# Patient Record
Sex: FEMALE | Race: WHITE | NOT HISPANIC OR LATINO | Employment: OTHER | URBAN - METROPOLITAN AREA
[De-identification: names, ages, dates, MRNs, and addresses within clinical notes are randomized per-mention and may not be internally consistent; named-entity substitution may affect disease eponyms.]

---

## 2017-01-31 ENCOUNTER — OFFICE VISIT (OUTPATIENT)
Dept: MEDICAL GROUP | Facility: MEDICAL CENTER | Age: 72
End: 2017-01-31
Payer: MEDICARE

## 2017-01-31 VITALS
HEIGHT: 60 IN | TEMPERATURE: 98.4 F | DIASTOLIC BLOOD PRESSURE: 84 MMHG | HEART RATE: 103 BPM | WEIGHT: 134 LBS | OXYGEN SATURATION: 98 % | BODY MASS INDEX: 26.31 KG/M2 | SYSTOLIC BLOOD PRESSURE: 140 MMHG

## 2017-01-31 DIAGNOSIS — B00.9 HERPES SIMPLEX VIRUS (HSV) INFECTION: ICD-10-CM

## 2017-01-31 DIAGNOSIS — N28.1 ACQUIRED CYST OF KIDNEY: ICD-10-CM

## 2017-01-31 DIAGNOSIS — M54.5 CHRONIC LOW BACK PAIN, UNSPECIFIED BACK PAIN LATERALITY, WITH SCIATICA PRESENCE UNSPECIFIED: ICD-10-CM

## 2017-01-31 DIAGNOSIS — M85.80 OSTEOPENIA: ICD-10-CM

## 2017-01-31 DIAGNOSIS — G47.00 INSOMNIA, UNSPECIFIED TYPE: ICD-10-CM

## 2017-01-31 DIAGNOSIS — Z78.0 POSTMENOPAUSAL: ICD-10-CM

## 2017-01-31 DIAGNOSIS — G89.29 CHRONIC LOW BACK PAIN, UNSPECIFIED BACK PAIN LATERALITY, WITH SCIATICA PRESENCE UNSPECIFIED: ICD-10-CM

## 2017-01-31 DIAGNOSIS — E06.3 LYMPHOCYTIC THYROIDITIS: ICD-10-CM

## 2017-01-31 DIAGNOSIS — Z00.00 MEDICARE ANNUAL WELLNESS VISIT, SUBSEQUENT: ICD-10-CM

## 2017-01-31 DIAGNOSIS — M43.10 SPONDYLOLISTHESIS, UNSPECIFIED SPINAL REGION: ICD-10-CM

## 2017-01-31 DIAGNOSIS — E78.49 OTHER HYPERLIPIDEMIA: ICD-10-CM

## 2017-01-31 DIAGNOSIS — Z12.31 ENCOUNTER FOR SCREENING MAMMOGRAM FOR BREAST CANCER: ICD-10-CM

## 2017-01-31 DIAGNOSIS — L50.8 CHRONIC URTICARIA: ICD-10-CM

## 2017-01-31 DIAGNOSIS — E53.8 B12 DEFICIENCY: ICD-10-CM

## 2017-01-31 PROCEDURE — 1036F TOBACCO NON-USER: CPT | Performed by: FAMILY MEDICINE

## 2017-01-31 PROCEDURE — G0439 PPPS, SUBSEQ VISIT: HCPCS | Performed by: FAMILY MEDICINE

## 2017-01-31 PROCEDURE — G8432 DEP SCR NOT DOC, RNG: HCPCS | Performed by: FAMILY MEDICINE

## 2017-01-31 ASSESSMENT — PATIENT HEALTH QUESTIONNAIRE - PHQ9: CLINICAL INTERPRETATION OF PHQ2 SCORE: 0

## 2017-01-31 NOTE — MR AVS SNAPSHOT
Joie Hart   2017 11:20 AM   Office Visit   MRN: [id number]    Department:  [facility]   Dept Phone:  [phone]    Description:  Female : 1945   Provider:  Monae Beck M.D.           Reason for Visit     Annual Exam           Allergies as of 2017     Allergen Noted Reactions    Codeine 2016   Rash    Rash all over          Vital Signs     Blood Pressure Pulse Temperature Height Weight Body Mass Index    140/84 mmHg 103 36.9 °C (98.4 °F) 1.524 m (5') 60.782 kg (134 lb) 26.17 kg/m2    Oxygen Saturation Breastfeeding? Smoking Status             98% No Former Smoker         Basic Information     Date Of Birth Sex Race Ethnicity Preferred Language    1945 Female White Non- English      Problem List              ICD-10-CM Priority Class Noted - Resolved    Acquired cyst of kidney N28.1   3/23/2016 - Present    Cortical senile cataract H25.019   10/7/2014 - Present    Lymphocytic thyroiditis E06.3   3/23/2016 - Present    History of thyroid disease Z86.39   3/23/2016 - Present    Herpes simplex virus (HSV) infection B00.9   2012 - Present    HLD (hyperlipidemia) E78.5   2012 - Present    Insomnia G47.00   2013 - Present    Osteopenia M85.80   2012 - Present    Chronic urticaria L50.8   2012 - Present    Chronic back pain M54.9, G89.29   Unknown - Present    Spondylisthesis M43.10   Unknown - Present    Head injury, closed S09.90XA   3/6/2016 - Present    Chronic nonintractable headache R51   2016 - Present    HSV infection B00.9   Unknown - Present    Renal cyst N28.1   2016 - Present    Hair loss L65.9   2016 - Present    Abnormal findings on diagnostic imaging of central nervous system R90.89   2016 - Present    Pain of left great toe M79.675   2016 - Present    Deformity of nail bed L60.9   2016 - Present    B12 deficiency E53.8   2016 - Present      Health Maintenance        Date Due Completion  Dates    IMM DTaP/Tdap/Td Vaccine (1 - Tdap) 6/23/1965 ---    PAP SMEAR 6/23/1967 ---    COLONOSCOPY 6/23/1996 ---    IMM ZOSTER VACCINE 6/23/2006 ---    IMM PNEUMOCOCCAL 65+ (ADULT) LOW/MEDIUM RISK SERIES (1 of 2 - PCV13) 6/23/2011 ---    IMM INFLUENZA (1) 9/1/2016 ---    BONE DENSITY 12/12/2016 12/12/2011, 10/1/2008    MAMMOGRAM 11/14/2017 11/14/2016, 10/13/2015, 11/21/2013, 12/12/2011, 12/10/2010, 10/15/2009, 10/15/2009, 10/1/2008, 10/1/2008            Current Immunizations     No immunizations on file.      Below and/or attached are the medications your provider expects you to take. Review all of your home medications and newly ordered medications with your provider and/or pharmacist. Follow medication instructions as directed by your provider and/or pharmacist. Please keep your medication list with you and share with your provider. Update the information when medications are discontinued, doses are changed, or new medications (including over-the-counter products) are added; and carry medication information at all times in the event of emergency situations     Allergies:  CODEINE - Rash               Medications  Valid as of: January 31, 2017 - 12:38 PM    Generic Name Brand Name Tablet Size Instructions for use    Acyclovir (Tab) ZOVIRAX 800 MG Take 800 mg by mouth 2 times a day.        Amitriptyline HCl (Tab) ELAVIL 150 MG Take 150 mg by mouth every evening.        Cyanocobalamin   by Injection route.        HydrOXYzine HCl (Tab) ATARAX 25 MG Take 25 mg by mouth 3 times a day as needed for Itching.        Levothyroxine Sodium (Tab) SYNTHROID 75 MCG Take 75 mcg by mouth Every morning on an empty stomach.        LORazepam (Tab) ATIVAN 1 MG 1 po 30 minutes prior to procedure.  May repeat x 1        Multiple Vitamins-Minerals (Tab) THERAGRAN-M  Take 1 Tab by mouth every day.        Probiotic Product   Take  by mouth.        TraMADol HCl (Tab) ULTRAM 50 MG TAKE 1 TABLET BY MOUTH FOUR TIMES DAILY AS NEEDED FOR PAIN          Vitamin A   Take  by mouth.        .                 Medicines prescribed today were sent to:     [email or web address] [id number] [place] [address], [place] - [address] [facility]    [address] [place] [address]    Phone: [phone] Fax: [phone]    Open 24 Hours?: No      Medication refill instructions:       If your prescription bottle indicates you have medication refills left, it is not necessary to call your provider’s office. Please contact your pharmacy and they will refill your medication.    If your prescription bottle indicates you do not have any refills left, you may request refills at any time through one of the following ways: The online Storefront system (except Urgent Care), by calling your provider’s office, or by asking your pharmacy to contact your provider’s office with a refill request. Medication refills are processed only during regular business hours and may not be available until the next business day. Your provider may request additional information or to have a follow-up visit with you prior to refilling your medication.   *Please Note: Medication refills are assigned a new Rx number when refilled electronically. Your pharmacy may indicate that no refills were authorized even though a new prescription for the same medication is available at the pharmacy. Please request the medicine by name with the pharmacy before contacting your provider for a refill.           Storefront Access Code: Activation code not generated  Current Storefront Status: Active

## 2017-02-03 NOTE — PROGRESS NOTES
Subjective:     Chief Complaint   Patient presents with   • Annual Exam       Joie Hart is a 71 y.o. female here today for evaluation and management of:    Depression Screening    Little interest or pleasure in doing things?  0 - not at all  Feeling down, depressed , or hopeless? 0 - not at all  Patient Health Questionnaire Score: 0     If depressive symptoms identified deferred to follow up visit unless specifically addressed in assesment and plan.    Screening for Cognitive Impairment    Three Minute Recall (banana, sunrise, fence)  2/3    Draw clock face with all 12 numbers set to the hand to show 10 minures past 11 o'clock  1    Cognitive concerns identified defferred for follow up unless specifically addressed in assesment and plan.    Fall Risk Assessment    Has the patient had two or more falls in the last year or any fall with injury in the last year?  Yes    Safety Assessment    Throw rugs on floor.  Yes  Handrails on all stairs.  No  Good lighting in all hallways.  Yes  Difficulty hearing.  No  Patient counseled about all safety risks that were identified.    Functional Assessment ADLs    Are there any barriers preventing you from cooking for yourself or meeting nutritional needs?  No.    Are there any barriers preventing you from driving safely or obtaining transportation?  No.    Are there any barriers preventing you from using a telephone or calling for help?  No.    Are there any barriers preventing you from shopping?  No.    Are there any barriers preventing you from taking care of your own finances?  No.    Are there any barriers preventing you from managing your medications?  No.    Are currently engaing any exercise or physical activity?  Yes.  Walk     Health Maintenance Summary                IMM ZOSTER VACCINE Overdue 6/23/2006     IMM PNEUMOCOCCAL 65+ (ADULT) LOW/MEDIUM RISK SERIES Overdue 6/23/2011     IMM INFLUENZA Overdue 9/1/2016     COLON CANCER SCREENING ANNUAL FIT Overdue 12/9/2016       Done 12/9/2015     MAMMOGRAM Next Due 11/14/2017      Done 11/14/2016 MA-MAMMO SCREENING BILAT W/TOMOSYNTHESIS W/CAD     Patient has more history with this topic...    BONE DENSITY Next Due 12/8/2019      Done 12/8/2014      Patient has more history with this topic...    IMM DTaP/Tdap/Td Vaccine Next Due 12/9/2025      Done 12/9/2015 Imm Admin: Tdap Vaccine          Patient Care Team:  Monae Beck M.D. as PCP - General (Family Medicine)    Allergies   Allergen Reactions   • Codeine Rash     Rash all over           Current medicines (including changes today)  Current Outpatient Prescriptions   Medication Sig Dispense Refill   • tramadol (ULTRAM) 50 MG Tab TAKE 1 TABLET BY MOUTH FOUR TIMES DAILY AS NEEDED FOR PAIN 60 Tab 0   • acyclovir (ZOVIRAX) 800 MG Tab Take 800 mg by mouth 2 times a day.     • amitriptyline (ELAVIL) 150 MG Tab Take 150 mg by mouth every evening.     • hydrOXYzine (ATARAX) 25 MG Tab Take 25 mg by mouth 3 times a day as needed for Itching.     • VITAMIN A PO Take  by mouth.     • therapeutic multivitamin-minerals (THERAGRAN-M) Tab Take 1 Tab by mouth every day.     • Probiotic Product (PROBIOTIC DAILY PO) Take  by mouth.     • levothyroxine (SYNTHROID) 75 MCG Tab Take 75 mcg by mouth Every morning on an empty stomach.     • lorazepam (ATIVAN) 1 MG Tab 1 po 30 minutes prior to procedure.  May repeat x 1 4 Tab 0   • Cyanocobalamin (VITAMIN B-12 INJ) by Injection route.       No current facility-administered medications for this visit.       She  has a past medical history of Depression (2009); Chronic back pain; Pelvic fracture (CMS-HCC) (1976); Thyroid disease; Spondylisthesis; Head injury, closed (3/6/16); Diverticulitis; Vaginal vault prolapse (2006); HSV infection; and Cataract.    Patient Active Problem List    Diagnosis Date Noted   • B12 deficiency 12/20/2016   • Chronic back pain    • Spondylisthesis    • Acquired cyst of kidney 03/23/2016   • Lymphocytic thyroiditis 03/23/2016   •  Insomnia 12/06/2013   • Herpes simplex virus (HSV) infection 11/07/2012   • HLD (hyperlipidemia) 11/07/2012   • Chronic urticaria 11/07/2012   • Osteopenia 11/06/2012       ROS   No fever or chills.  No nausea or vomiting.  No chest pain or palpitations.  No cough or SOB.  No pain with urination or hematuria.  No black or bloody stools.       Objective:     Blood pressure 140/84, pulse 103, temperature 36.9 °C (98.4 °F), height 1.524 m (5'), weight 60.782 kg (134 lb), SpO2 98 %, not currently breastfeeding. Body mass index is 26.17 kg/(m^2).   Physical Exam:  Well developed, well nourished.  Alert, oriented in no acute distress.  Eye contact is good, speech goal directed, affect calm  Eyes: conjunctiva non-injected, sclera non-icteric.  Ears: Pinna normal. TM pearly gray.   Nose: Nares are patent.  Normal mucosa  Mouth: Oral mucous membranes pink and moist with no lesions.  Neck Supple.  No adenopathy or masses in the neck or supraclavicular regions. No thyromegaly  Lungs: clear to auscultation bilaterally with good excursion. No wheezes or rhonchi  CV: regular rate and rhythm. No murmur  Breasts: Soft, no masses, no nipple discharge, no skin dimpling. No axillary adenopathy  Abdomen: soft, nontender, no masses or organomegaly.  No rebound or guarding   Vulva: Normal female, no lesions  Bimanual exam: No masses or tenderness  Rectal: Good tone, heme negative, no masses  Ext: no edema, color normal, vascularity normal, temperature normal  Neurologic: Normal gait, no cerebellar signs, 5/5 motor strength        Assessment and Plan:   The following treatment plan was discussed    1. Medicare annual wellness visit, subsequent  Routine anticipatory guidance. Reviewed recent labs. Order for mammogram given.    2. Acquired cyst of kidney  Stable. Continue to monitor.    3. Lymphocytic thyroiditis  Stable. Follow-up with endocrinology.    4. Herpes simplex virus (HSV) infection  Stable. Continue acyclovir as directed    5.  Other hyperlipidemia  Stable. Dietary counseling done. Continue to monitor.    6. Insomnia, unspecified type  Improving. No medications at this time.    7. Osteopenia  Stable. Due for repeat bone density this year. Order given    8. Chronic urticaria  Stable. Continue Atarax as needed    9. Chronic low back pain, unspecified back pain laterality, with sciatica presence unspecified  Stable. Continue tramadol. Follow up with pain management.    10. Spondylolisthesis, unspecified spinal region  Stable. Continue tramadol.    11. B12 deficiency  Continue B12 injections.    Any change or worsening of signs or symptoms, patient encouraged to follow-up or report to the emergency room for further evaluation. Patient understands and agrees.    Followup: Return in about 6 months (around 7/31/2017).

## 2017-02-22 ENCOUNTER — HOSPITAL ENCOUNTER (OUTPATIENT)
Dept: RADIOLOGY | Facility: MEDICAL CENTER | Age: 72
End: 2017-02-22
Attending: FAMILY MEDICINE
Payer: MEDICARE

## 2017-02-22 DIAGNOSIS — M85.80 OSTEOPENIA: ICD-10-CM

## 2017-02-22 DIAGNOSIS — Z78.0 POSTMENOPAUSAL: ICD-10-CM

## 2017-02-22 PROCEDURE — 77080 DXA BONE DENSITY AXIAL: CPT

## 2017-05-22 ENCOUNTER — PATIENT MESSAGE (OUTPATIENT)
Dept: MEDICAL GROUP | Facility: MEDICAL CENTER | Age: 72
End: 2017-05-22

## 2017-05-22 ENCOUNTER — APPOINTMENT (OUTPATIENT)
Dept: RADIOLOGY | Facility: MEDICAL CENTER | Age: 72
End: 2017-05-22
Attending: EMERGENCY MEDICINE
Payer: MEDICARE

## 2017-05-22 ENCOUNTER — HOSPITAL ENCOUNTER (EMERGENCY)
Facility: MEDICAL CENTER | Age: 72
End: 2017-05-22
Attending: EMERGENCY MEDICINE
Payer: MEDICARE

## 2017-05-22 VITALS
HEART RATE: 96 BPM | RESPIRATION RATE: 18 BRPM | TEMPERATURE: 98.4 F | SYSTOLIC BLOOD PRESSURE: 185 MMHG | HEIGHT: 61 IN | BODY MASS INDEX: 25.43 KG/M2 | WEIGHT: 134.7 LBS | DIASTOLIC BLOOD PRESSURE: 97 MMHG | OXYGEN SATURATION: 100 %

## 2017-05-22 DIAGNOSIS — J40 BRONCHITIS: ICD-10-CM

## 2017-05-22 PROCEDURE — A9270 NON-COVERED ITEM OR SERVICE: HCPCS | Performed by: EMERGENCY MEDICINE

## 2017-05-22 PROCEDURE — 700102 HCHG RX REV CODE 250 W/ 637 OVERRIDE(OP): Performed by: EMERGENCY MEDICINE

## 2017-05-22 PROCEDURE — 71020 DX-CHEST-2 VIEWS: CPT

## 2017-05-22 PROCEDURE — 99284 EMERGENCY DEPT VISIT MOD MDM: CPT

## 2017-05-22 RX ORDER — AZITHROMYCIN 250 MG/1
500 TABLET, FILM COATED ORAL ONCE
Status: COMPLETED | OUTPATIENT
Start: 2017-05-22 | End: 2017-05-22

## 2017-05-22 RX ORDER — AZITHROMYCIN 250 MG/1
TABLET, FILM COATED ORAL
Qty: 6 TAB | Refills: 0 | Status: SHIPPED | OUTPATIENT
Start: 2017-05-22 | End: 2017-07-25

## 2017-05-22 RX ORDER — CHOLECALCIFEROL (VITAMIN D3) 125 MCG
500 CAPSULE ORAL DAILY
Status: SHIPPED | COMMUNITY
End: 2022-08-08

## 2017-05-22 RX ORDER — BENZONATATE 100 MG/1
100 CAPSULE ORAL 3 TIMES DAILY PRN
Qty: 60 CAP | Refills: 0 | Status: SHIPPED | OUTPATIENT
Start: 2017-05-22 | End: 2017-06-15 | Stop reason: SDUPTHER

## 2017-05-22 RX ORDER — BENZONATATE 100 MG/1
100 CAPSULE ORAL ONCE
Status: COMPLETED | OUTPATIENT
Start: 2017-05-22 | End: 2017-05-22

## 2017-05-22 RX ADMIN — AZITHROMYCIN 500 MG: 250 TABLET, FILM COATED ORAL at 19:33

## 2017-05-22 RX ADMIN — BENZONATATE 100 MG: 100 CAPSULE ORAL at 19:34

## 2017-05-22 ASSESSMENT — PAIN SCALES - GENERAL: PAINLEVEL_OUTOF10: 2

## 2017-05-22 NOTE — ED AVS SNAPSHOT
5/22/2017    Joie Hart  [address] [place] [address]    Dear Joie:    Critical access hospital wants to ensure your discharge home is safe and you or your loved ones have had all of your questions answered regarding your care after you leave the hospital.    Below is a list of resources and contact information should you have any questions regarding your hospital stay, follow-up instructions, or active medical symptoms.    Questions or Concerns Regarding… Contact   Medical Questions Related to Your Discharge  (7 days a week, 8am-5pm) Contact a Nurse Care Coordinator   [phone]   Medical Questions Not Related to Your Discharge  (24 hours a day / 7 days a week)  Contact the Nurse Health Line   [phone]    Medications or Discharge Instructions Refer to your discharge packet   or contact your [facility] Primary Care Provider   [phone]   Follow-up Appointment(s) Schedule your appointment via [employer]   or contact Scheduling [phone]   Billing Review your statement via [employer]  or contact Billing [phone]   Medical Records Review your records via [employer]   or contact Medical Records [phone]     You may receive a telephone call within two days of discharge. This call is to make certain you understand your discharge instructions and have the opportunity to have any questions answered. You can also easily access your medical information, test results and upcoming appointments via the [employer] free online health management tool. You can learn more and sign up at [employer]. For assistance setting up your [employer] account, please call [phone].    Once again, we want to ensure your discharge home is safe and that you have a clear understanding of any next steps in your care. If you have any questions or concerns, please do not hesitate to contact us, we are here for you. Thank you for choosing [facility] for your healthcare needs.    Sincerely,    Your [facility] Healthcare Team

## 2017-05-22 NOTE — ED AVS SNAPSHOT
PersistIQ Access Code: Activation code not generated  Current PersistIQ Status: Active    [employer]  A secure, online tool to manage your health information     [employer]’s PersistIQ® is a secure, online tool that connects you to your personalized health information from the privacy of your home -- day or night - making it very easy for you to manage your healthcare. Once the activation process is completed, you can even access your medical information using the PersistIQ elton, which is available for free in the Apple Elton store or Google Play store.     PersistIQ provides the following levels of access (as shown below):   My Chart Features   [facility] Primary Care Doctor [facility]  Specialists [facility]  Urgent  Care Non-[facility]  Primary Care  Doctor   Email your healthcare team securely and privately 24/7 X X X X   Manage appointments: schedule your next appointment; view details of past/upcoming appointments X      Request prescription refills. X      View recent personal medical records, including lab and immunizations X X X X   View health record, including health history, allergies, medications X X X X   Read reports about your outpatient visits, procedures, consult and ER notes X X X X   See your discharge summary, which is a recap of your hospital and/or ER visit that includes your diagnosis, lab results, and care plan. X X       How to register for PersistIQ:  1. Go to  [email or web address].  2. Click on the Sign Up Now box, which takes you to the New Member Sign Up page. You will need to provide the following information:  a. Enter your PersistIQ Access Code exactly as it appears at the top of this page. (You will not need to use this code after you’ve completed the sign-up process. If you do not sign up before the expiration date, you must request a new code.)   b. Enter your date of birth.   c. Enter your home email address.   d. Click Submit, and follow the next screen’s instructions.  3. Create a PersistIQ ID. This will  be your [employer] login ID and cannot be changed, so think of one that is secure and easy to remember.  4. Create a [employer] password. You can change your password at any time.  5. Enter your Password Reset Question and Answer. This can be used at a later time if you forget your password.   6. Enter your e-mail address. This allows you to receive e-mail notifications when new information is available in [employer].  7. Click Sign Up. You can now view your health information.    For assistance activating your [employer] account, call [phone]

## 2017-05-22 NOTE — ED NOTES
"Pt bib family with c/o of cough for 3 months. Pt states \"I think this is whooping cough. Which is very contagious.\" Reports mucus production since yesterday.   "

## 2017-05-22 NOTE — TELEPHONE ENCOUNTER
1. Caller Name: Joie Hart                   Call Back Number: [phone]    2. Message: Spoke with Pt. and she stated she got in with another Provider for this issue. She no longer needs to make appointment.    3. Patient approves office to leave a detailed voicemail/MyChart message:  N\A

## 2017-05-22 NOTE — ED AVS SNAPSHOT
Home Care Instructions                                                                                                                Joie Hart   MRN: [id number]    Department:  [facility], Emergency Dept   Date of Visit:  5/22/2017            [facility] Emergency Dept    [address] [facility] [address]    Phone:  [phone]      You were seen by     Dipesh Thompson D.O.      Your Diagnosis Was     Bronchitis     J40       These are the medications you received during your hospitalization from 05/22/2017 1552 to 05/22/2017 1935     Date/Time Order Dose Route Action    05/22/2017 1815 albuterol (PROVENTIL) 2.5mg/0.5ml nebulizer solution 2.5 mg 2.5 mg Nebulization Refused    05/22/2017 1933 azithromycin (ZITHROMAX) tablet 500 mg 500 mg Oral Given    05/22/2017 1934 benzonatate (TESSALON) capsule 100 mg 100 mg Oral Given      Follow-up Information     1. Follow up with Monae Beck M.D..    Specialty:  Family Medicine    Contact information    [address] [facility]  [address]  [facility] [address] [phone]        Medication Information     Review all of your home medications and newly ordered medications with your primary doctor and/or pharmacist as soon as possible. Follow medication instructions as directed by your doctor and/or pharmacist.     Please keep your complete medication list with you and share with your physician. Update the information when medications are discontinued, doses are changed, or new medications (including over-the-counter products) are added; and carry medication information at all times in the event of emergency situations.               Medication List      START taking these medications        Instructions    Morning Afternoon Evening Bedtime    azithromycin 250 MG Tabs   Last time this was given:  500 mg on 5/22/2017  7:33 PM   Commonly known as:  ZITHROMAX        Take two tabs by mouth on day one, then one tab by  mouth daily on days 2-5.                        benzonatate 100 MG Caps   Last time this was given:  100 mg on 5/22/2017  7:34 PM   Commonly known as:  TESSALON        Take 1 Cap by mouth 3 times a day as needed for Cough.   Dose:  100 mg                          ASK your doctor about these medications        Instructions    Morning Afternoon Evening Bedtime    amitriptyline 150 MG Tabs   Commonly known as:  ELAVIL        Take 150 mg by mouth every evening.   Dose:  150 mg                        coenzyme Q-10 30 MG capsule        Take 60 mg by mouth every day.   Dose:  60 mg                        cyanocobalamin 500 MCG Tabs   Commonly known as:  VITAMIN B-12        Take 500 mcg by mouth every day.   Dose:  500 mcg                        levothyroxine 75 MCG Tabs   Commonly known as:  SYNTHROID        Take 75 mcg by mouth Every morning on an empty stomach.   Dose:  75 mcg                        PROBIOTIC DAILY PO        Take  by mouth.                        therapeutic multivitamin-minerals Tabs        Take 1 Tab by mouth every day.   Dose:  1 Tab                        tramadol 50 MG Tabs   Commonly known as:  ULTRAM        TAKE 1 TABLET BY MOUTH FOUR TIMES DAILY AS NEEDED FOR PAIN                        VITAMIN A PO        Take  by mouth.                             Where to Get Your Medications      These medications were sent to [facility] [address] [place] [address] - [address] [place] [address] AT [facility] [place] [id number] [facility] [address]     Phone:  [phone]    - azithromycin 250 MG Tabs  - benzonatate 100 MG Caps            Procedures and tests performed during your visit     DX-CHEST-2 VIEWS    NPPB        Discharge Instructions       Bronchitis  Bronchitis is the body's way of reacting to injury and/or infection (inflammation) of the bronchi. Bronchi are the air tubes that extend from the windpipe into the lungs. If the inflammation becomes severe, it may cause shortness of  breath.  CAUSES   Inflammation may be caused by:  · A virus.  · Germs (bacteria).  · Dust.  · Allergens.  · Pollutants and many other irritants.  The cells lining the bronchial tree are covered with tiny hairs (cilia). These constantly beat upward, away from the lungs, toward the mouth. This keeps the lungs free of pollutants. When these cells become too irritated and are unable to do their job, mucus begins to develop. This causes the characteristic cough of bronchitis. The cough clears the lungs when the cilia are unable to do their job. Without either of these protective mechanisms, the mucus would settle in the lungs. Then you would develop pneumonia.  Smoking is a common cause of bronchitis and can contribute to pneumonia. Stopping this habit is the single most important thing you can do to help yourself.  TREATMENT   · Your caregiver may prescribe an antibiotic if the cough is caused by bacteria. Also, medicines that open up your airways make it easier to breathe. Your caregiver may also recommend or prescribe an expectorant. It will loosen the mucus to be coughed up. Only take over-the-counter or prescription medicines for pain, discomfort, or fever as directed by your caregiver.  · Removing whatever causes the problem (smoking, for example) is critical to preventing the problem from getting worse.  · Cough suppressants may be prescribed for relief of cough symptoms.  · Inhaled medicines may be prescribed to help with symptoms now and to help prevent problems from returning.  · For those with recurrent (chronic) bronchitis, there may be a need for steroid medicines.  SEEK IMMEDIATE MEDICAL CARE IF:   · During treatment, you develop more pus-like mucus (purulent sputum).  · You have a fever.  · Your baby is older than 3 months with a rectal temperature of 102° F (38.9° C) or higher.  · Your baby is 3 months old or younger with a rectal temperature of 100.4° F (38° C) or higher.  · You become progressively more  ill.  · You have increased difficulty breathing, wheezing, or shortness of breath.  It is necessary to seek immediate medical care if you are elderly or sick from any other disease.  MAKE SURE YOU:   · Understand these instructions.  · Will watch your condition.  · Will get help right away if you are not doing well or get worse.  Document Released: 12/18/2006 Document Revised: 03/11/2013 Document Reviewed: 10/27/2009  ExitCare® Patient Information ©2014 [employer].            Patient Information     Patient Information    Following emergency treatment: all patient requiring follow-up care must return either to a private physician or a clinic if your condition worsens before you are able to obtain further medical attention, please return to the emergency room.     Billing Information    At [facility], we work to make the billing process streamlined for our patients.  Our Representatives are here to answer any questions you may have regarding your hospital bill.  If you have insurance coverage and have supplied your insurance information to us, we will submit a claim to your insurer on your behalf.  Should you have any questions regarding your bill, we can be reached online or by phone as follows:  Online: You are able pay your bills online or live chat with our representatives about any billing questions you may have. We are here to help Monday - Friday from 8:00am to 7:30pm and 9:00am - 12:00pm on Saturdays.  Please visit [email or web address]  for more information.   Phone:  [phone] or [phone]    Please note that your emergency physician, surgeon, pathologist, radiologist, anesthesiologist, and other specialists are not employed by [facility] and will therefore bill separately for their services.  Please contact them directly for any questions concerning their bills at the numbers below:     [facility]:  [phone]  [facility]:   [phone]  [facility]:  [phone]  [facility]:  [phone]    1. Your final bill may vary from the amount quoted upon discharge if all procedures are not complete at that time, or if your doctor has additional procedures of which we are not aware. You will receive an additional bill if you return to the Emergency Department at Counts include 234 beds at the [facility] for suture removal regardless of the facility of which the sutures were placed.     2. Please arrange for settlement of this account at the emergency registration.    3. All self-pay accounts are due in full at the time of treatment.  If you are unable to meet this obligation then payment is expected within 4-5 days.     4. If you have had radiology studies (CT, X-ray, Ultrasound, MRI), you have received a preliminary result during your emergency department visit. Please contact the radiology department [phone] to receive a copy of your final result. Please discuss the Final result with your primary physician or with the follow up physician provided.     Crisis Hotline:  [place] Crisis Hotline:  7-816-PAASVBE or 1-137.499.8201  [place] Crisis Hotline:    1-233.405.2888 or 693-945-3282         ED Discharge Follow Up Questions    1. In order to provide you with very good care, we would like to follow up with a phone call in the next few days.  May we have your permission to contact you?     YES /  NO    2. What is the best phone number to call you? (       )_____-__________    3. What is the best time to call you?      Morning  /  Afternoon  /  Evening                   Patient Signature:  ____________________________________________________________    Date:  ____________________________________________________________

## 2017-05-22 NOTE — TELEPHONE ENCOUNTER
----- Message from Your Healthcare Team sent at 5/22/2017  1:49 PM PDT -----  Regarding: Non-Urgent Medical Question  Contact: [phone]  I have a very bad cold possible whooping cough and need an appointment soon.

## 2017-05-23 ENCOUNTER — PATIENT OUTREACH (OUTPATIENT)
Dept: HEALTH INFORMATION MANAGEMENT | Facility: OTHER | Age: 72
End: 2017-05-23

## 2017-05-23 NOTE — DISCHARGE INSTRUCTIONS

## 2017-05-23 NOTE — RESPIRATORY CARE
Respirator Note:    Pt was informed of the uses, side effects, benefits of albuterol treatment. Pt refuses at this time stating that she is sensitive to medication. MD was informed of pt wishes.

## 2017-05-23 NOTE — ED PROVIDER NOTES
ED Provider Note    CHIEF COMPLAINT  Chief Complaint   Patient presents with   • Cough   • Congestion       HPI  Joie Hart is a 71 y.o. female here for evaluation of cough for 3 months. Patient states that she is had increasing cough over the last week as well, and admits to some productive white mucus since yesterday. She denies any fever, vomiting, chest pain, or shortness of breath. She is not taking anything for the discomfort, and she has not been on any recent antibiotics. She states that she does get this every once in a while, and it it typically is more of bronchitis. She has been doing her own research, and was concerned about whooping cough.    PAST MEDICAL HISTORY   has a past medical history of Depression (2009); Chronic back pain; Pelvic fracture (CMS-HCC) (1976); Thyroid disease; Spondylisthesis; Head injury, closed (3/6/16); Diverticulitis; Vaginal vault prolapse (2006); HSV infection; and Cataract.    SOCIAL HISTORY  Social History     Social History Main Topics   • Smoking status: Former Smoker -- 1.00 packs/day for 25 years     Types: Cigarettes     Quit date: 06/17/1980   • Smokeless tobacco: Never Used   • Alcohol Use: 6.0 oz/week     10 Glasses of wine per week   • Drug Use: No   • Sexual Activity: Not Currently       SURGICAL HISTORY   has past surgical history that includes primary c section (1983); elbow arthrotomy (Right); shoulder arthroscopy (Right, 2006); full thickness skin graft (Right, 1966); and cataract extraction with iol (Bilateral).    CURRENT MEDICATIONS  Home Medications     Reviewed by Edda Holguin (Pharmacy Tech) on 05/22/17 at 1706  Med List Status: Complete    Medication Last Dose Status    amitriptyline (ELAVIL) 150 MG Tab 5/21/2017 Active    coenzyme Q-10 30 MG capsule 5/22/2017 Active    cyanocobalamin (VITAMIN B-12) 500 MCG Tab 5/22/2017 Active    levothyroxine (SYNTHROID) 75 MCG Tab 5/22/2017 Active    Probiotic Product (PROBIOTIC DAILY PO) 5/22/2017  "Active    therapeutic multivitamin-minerals (THERAGRAN-M) Tab 5/22/2017 Active    tramadol (ULTRAM) 50 MG Tab >week Active    VITAMIN A PO 5/22/2017 Active                ALLERGIES  Allergies   Allergen Reactions   • Codeine Rash     Rash all over           REVIEW OF SYSTEMS  See HPI for further details. Review of systems as above, otherwise all other systems are negative.     PHYSICAL EXAM  VITAL SIGNS: /86 mmHg  Pulse 99  Temp(Src) 36.9 °C (98.4 °F)  Resp 16  Ht 1.549 m (5' 1\")  Wt 61.1 kg (134 lb 11.2 oz)  BMI 25.46 kg/m2  SpO2 97%    Constitutional: No distress. Well nourished.  HENT: Head is atraumatic. Oropharynx is moist.   Eyes: Conjunctivae are normal. EOMI.   Respiratory:  No respiratory distress, slight right lower expiratory wheeze. Equal expansion  Musculoskeletal: Normal range of motion. No edema.   Neurological: Alert. No focal deficits noted.    Skin: No rash. No Pallor.   Psych: Appropriate for clinical situation. Normal affect.    DX-CHEST-2 VIEWS   Final Result         1. No active cardiopulmonary abnormalities are identified.            PROCEDURES       MEDICAL RECORD  I have reviewed patient's medical record and pertinent results are listed above.    COURSE & MEDICAL DECISION MAKING  I have reviewed any medical record information, laboratory studies and radiographic results as noted above.    Differential diagnoses include but not limited to: pn, bronchitis, uri    6:39 PM  The pt has refused the breathing treatment.     7:29 PM  The patient coughed multiple times in the room when I was present, and none of these appeared to be the characteristic sound of a whooping cough.  The pt is nontoxic appearing, comfortable, and afebrile.     Patient will follow up with primary care doctor for blood pressure control and management    This patient presents with bronchitis .  At this time, I have counseled the patient/family regarding their medications, pain control, and follow up.  They will " continue their medications, if any, as prescribed.  They will return immediately for any worsening symptoms and/or any other medical concerns.  They will see their doctor, or contact the doctor provided, in 1-2 days for follow up.       FINAL IMPRESSION  1. Bronchitis            Electronically signed by: Dipesh Thompson, 5/22/2017 6:33 PM

## 2017-05-23 NOTE — ED NOTES
BP elevated/ERP aware.  Pt states she gets very anxious.  States she has a BP moonitor at home.  Verbalizes understanding to re-check BP once relaxed at home and contact PCP or return to ED for continued elevation in BP.  Discharge and f/u instructions discussed and understanding verbalized.  Ambulatory out of ED.  RX x 2 sent electronically.

## 2017-05-26 ENCOUNTER — OFFICE VISIT (OUTPATIENT)
Dept: MEDICAL GROUP | Facility: MEDICAL CENTER | Age: 72
End: 2017-05-26
Payer: MEDICARE

## 2017-05-26 VITALS
BODY MASS INDEX: 25.72 KG/M2 | OXYGEN SATURATION: 98 % | WEIGHT: 131 LBS | HEIGHT: 60 IN | SYSTOLIC BLOOD PRESSURE: 130 MMHG | TEMPERATURE: 98.6 F | HEART RATE: 90 BPM | DIASTOLIC BLOOD PRESSURE: 80 MMHG

## 2017-05-26 DIAGNOSIS — J02.9 PHARYNGITIS, UNSPECIFIED ETIOLOGY: ICD-10-CM

## 2017-05-26 DIAGNOSIS — R05.3 PERSISTENT COUGH FOR 3 WEEKS OR LONGER: ICD-10-CM

## 2017-05-26 DIAGNOSIS — J30.1 SEASONAL ALLERGIC RHINITIS DUE TO POLLEN: ICD-10-CM

## 2017-05-26 LAB
INT CON NEG: NEGATIVE
INT CON POS: POSITIVE
S PYO AG THROAT QL: NORMAL

## 2017-05-26 PROCEDURE — G8419 CALC BMI OUT NRM PARAM NOF/U: HCPCS | Performed by: FAMILY MEDICINE

## 2017-05-26 PROCEDURE — 3014F SCREEN MAMMO DOC REV: CPT | Performed by: FAMILY MEDICINE

## 2017-05-26 PROCEDURE — 0518F FALL PLAN OF CARE DOCD: CPT | Mod: 8P | Performed by: FAMILY MEDICINE

## 2017-05-26 PROCEDURE — 3288F FALL RISK ASSESSMENT DOCD: CPT | Performed by: FAMILY MEDICINE

## 2017-05-26 PROCEDURE — 1100F PTFALLS ASSESS-DOCD GE2>/YR: CPT | Performed by: FAMILY MEDICINE

## 2017-05-26 PROCEDURE — 3017F COLORECTAL CA SCREEN DOC REV: CPT | Mod: 8P | Performed by: FAMILY MEDICINE

## 2017-05-26 PROCEDURE — 87880 STREP A ASSAY W/OPTIC: CPT | Performed by: FAMILY MEDICINE

## 2017-05-26 PROCEDURE — 4040F PNEUMOC VAC/ADMIN/RCVD: CPT | Mod: 8P | Performed by: FAMILY MEDICINE

## 2017-05-26 PROCEDURE — G8432 DEP SCR NOT DOC, RNG: HCPCS | Performed by: FAMILY MEDICINE

## 2017-05-26 PROCEDURE — 99214 OFFICE O/P EST MOD 30 MIN: CPT | Performed by: FAMILY MEDICINE

## 2017-05-26 PROCEDURE — 1036F TOBACCO NON-USER: CPT | Performed by: FAMILY MEDICINE

## 2017-05-26 RX ORDER — ACETAMINOPHEN 500 MG
500-1000 TABLET ORAL 2 TIMES DAILY
COMMUNITY
End: 2018-06-26

## 2017-05-26 RX ORDER — CHLORAL HYDRATE 500 MG
1000 CAPSULE ORAL
COMMUNITY
End: 2019-04-17

## 2017-05-26 NOTE — MR AVS SNAPSHOT
Joie Hart   2017 8:40 AM   Office Visit   MRN: [id number]    Department:  [facility]   Dept Phone:  [phone]    Description:  Female : 1945   Provider:  Monae Beck M.D.           Reason for Visit     Hospital Follow-up           Allergies as of 2017     Allergen Noted Reactions    Codeine 2016   Rash    Rash all over          You were diagnosed with     Persistent cough for 3 weeks or longer   [1040391]       Pharyngitis, unspecified etiology   [9896172]       Seasonal allergic rhinitis due to pollen   [[id number]]         Vital Signs     Blood Pressure Pulse Temperature Height Weight Body Mass Index    130/80 mmHg 90 37 °C (98.6 °F) 1.524 m (5') 59.421 kg (131 lb) 25.58 kg/m2    Oxygen Saturation Breastfeeding? 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   3/23/2016 - Present    Herpes simplex virus (HSV) infection B00.9   2012 - Present    HLD (hyperlipidemia) E78.5   2012 - Present    Insomnia G47.00   2013 - Present    Osteopenia M85.80   2012 - Present    Chronic urticaria L50.8   2012 - Present    Chronic back pain M54.9, G89.29   Unknown - Present    Spondylisthesis M43.10   Unknown - Present    B12 deficiency E53.8   2016 - Present    Persistent cough for 3 weeks or longer R05   2017 - Present    Pharyngitis J02.9   2017 - Present    Seasonal allergic rhinitis due to pollen J30.1   2017 - Present      Health Maintenance        Date Due Completion Dates    IMM ZOSTER VACCINE 2006 ---    IMM PNEUMOCOCCAL 65+ (ADULT) LOW/MEDIUM RISK SERIES (1 of 2 - PCV13) 2011 ---    COLON CANCER SCREENING ANNUAL FIT 2016  12/9/2015 (Done)    Override on 12/9/2015: Done    MAMMOGRAM 11/14/2017 11/14/2016, 10/13/2015, 11/21/2013, 12/12/2011, 12/10/2010, 10/15/2009, 10/15/2009, 10/1/2008, 10/1/2008    BONE DENSITY 2/22/2022 2/22/2017, 12/8/2014 (Done), 12/12/2011, 10/1/2008    Override on 12/8/2014: Done    IMM DTaP/Tdap/Td Vaccine (2 - Td) 12/9/2025 12/9/2015            Current Immunizations     Tdap Vaccine 12/9/2015      Below and/or attached are the medications your provider expects you to take. Review all of your home medications and newly ordered medications with your provider and/or pharmacist. Follow medication instructions as directed by your provider and/or pharmacist. Please keep your medication list with you and share with your provider. Update the information when medications are discontinued, doses are changed, or new medications (including over-the-counter products) are added; and carry medication information at all times in the event of emergency situations     Allergies:  CODEINE - Rash               Medications  Valid as of: May 26, 2017 -  9:41 AM    Generic Name Brand Name Tablet Size Instructions for use    Acetaminophen (Tab) TYLENOL 500 MG Take 500-1,000 mg by mouth every 6 hours as needed.        Amitriptyline HCl (Tab) ELAVIL 150 MG Take 150 mg by mouth every evening.        Azithromycin (Tab) ZITHROMAX 250 MG Take two tabs by mouth on day one, then one tab by mouth daily on days 2-5.        Benzonatate (Cap) TESSALON 100 MG Take 1 Cap by mouth 3 times a day as needed for Cough.        Coenzyme Q10 (Cap) coenzyme Q-10 30 MG Take 60 mg by mouth every day.        Cyanocobalamin (Tab) VITAMIN B-12 500 MCG Take 500 mcg by mouth every day.        Fluticasone-Salmeterol (AEROSOL POWDER, BREATH ACTIVATED) ADVAIR 250-50 MCG/DOSE Inhale 1 Puff by mouth every 12 hours.        Levothyroxine Sodium (Tab) SYNTHROID 75 MCG Take 75 mcg by mouth Every morning on an empty stomach.        Multiple Vitamins-Minerals (Tab)  THERAGRAN-M  Take 1 Tab by mouth every day.        Omega-3 Fatty Acids (Cap) fish oil 1000 MG Take 1,000 mg by mouth 3 times a day, with meals.        Probiotic Product   Take  by mouth.        TraMADol HCl (Tab) ULTRAM 50 MG TAKE 1 TABLET BY MOUTH FOUR TIMES DAILY AS NEEDED FOR PAIN        Vitamin A   Take  by mouth.        .                 Medicines prescribed today were sent to:     [facility] [address] [place] [address] [place] - [address] [facility]    [address] [facility] [place] [address]    Phone: [phone] Fax: [phone]    Open 24 Hours?: No      Medication refill instructions:       If your prescription bottle indicates you have medication refills left, it is not necessary to call your provider’s office. Please contact your pharmacy and they will refill your medication.    If your prescription bottle indicates you do not have any refills left, you may request refills at any time through one of the following ways: The online [employer] system (except Urgent Care), by calling your provider’s office, or by asking your pharmacy to contact your provider’s office with a refill request. Medication refills are processed only during regular business hours and may not be available until the next business day. Your provider may request additional information or to have a follow-up visit with you prior to refilling your medication.   *Please Note: Medication refills are assigned a new Rx number when refilled electronically. Your pharmacy may indicate that no refills were authorized even though a new prescription for the same medication is available at the pharmacy. Please request the medicine by name with the pharmacy before contacting your provider for a refill.        Instructions    Allergic Rhinitis  Allergic rhinitis is when the mucous membranes in the nose respond to allergens. Allergens are particles in the air that cause your body to have an allergic reaction. This causes you  to release allergic antibodies. Through a chain of events, these eventually cause you to release histamine into the blood stream. Although meant to protect the body, it is this release of histamine that causes your discomfort, such as frequent sneezing, congestion, and an itchy, runny nose.   CAUSES  Seasonal allergic rhinitis (hay fever) is caused by pollen allergens that may come from grasses, trees, and weeds. Year-round allergic rhinitis (perennial allergic rhinitis) is caused by allergens such as house dust mites, pet dander, and mold spores.  SYMPTOMS  · Nasal stuffiness (congestion).  · Itchy, runny nose with sneezing and tearing of the eyes.  DIAGNOSIS  Your health care provider can help you determine the allergen or allergens that trigger your symptoms. If you and your health care provider are unable to determine the allergen, skin or blood testing may be used. Your health care provider will diagnose your condition after taking your health history and performing a physical exam. Your health care provider may assess you for other related conditions, such as asthma, pink eye, or an ear infection.  TREATMENT  Allergic rhinitis does not have a cure, but it can be controlled by:  · Medicines that block allergy symptoms. These may include allergy shots, nasal sprays, and oral antihistamines.  · Avoiding the allergen.  Hay fever may often be treated with antihistamines in pill or nasal spray forms. Antihistamines block the effects of histamine. There are over-the-counter medicines that may help with nasal congestion and swelling around the eyes. Check with your health care provider before taking or giving this medicine.  If avoiding the allergen or the medicine prescribed do not work, there are many new medicines your health care provider can prescribe. Stronger medicine may be used if initial measures are ineffective. Desensitizing injections can be used if medicine and avoidance does not work. Desensitization is  when a patient is given ongoing shots until the body becomes less sensitive to the allergen. Make sure you follow up with your health care provider if problems continue.  HOME CARE INSTRUCTIONS  It is not possible to completely avoid allergens, but you can reduce your symptoms by taking steps to limit your exposure to them. It helps to know exactly what you are allergic to so that you can avoid your specific triggers.  SEEK MEDICAL CARE IF:  · You have a fever.  · You develop a cough that does not stop easily (persistent).  · You have shortness of breath.  · You start wheezing.  · Symptoms interfere with normal daily activities.     This information is not intended to replace advice given to you by your health care provider. Make sure you discuss any questions you have with your health care provider.     Document Released: 09/12/2002 Document Revised: 01/08/2016 Document Reviewed: 08/25/2014  [employer] Patient Education ©2016 [employer]            [employer] Access Code: Activation code not generated  Current [employer] Status: Active

## 2017-05-26 NOTE — ASSESSMENT & PLAN NOTE
Started with cough 6 months ago that is non-productive.  No SOB.  These symptoms waxed and waned.  She would cough 2-3 days a week.  Then on 5/19 sore throat, laryngitis, cough, low grade fever and yellow rhinorrhea.  She had ear congestion.  Symptoms negative for night sweats, facial pain, swollen glands, hemoptysis  Treatments tried: Zithromax didn't seem to help her symptoms.  She has a sore throat still.   Since onset, symptoms are worse   Similarly ill exposures: no  Medical history negative for asthma.  She has had reactions to evergreens and has had hives  She  reports that she quit smoking about 36 years ago. Her smoking use included Cigarettes. She has a 25 pack-year smoking history. She has never used smokeless tobacco.

## 2017-05-26 NOTE — PATIENT INSTRUCTIONS
Allergic Rhinitis  Allergic rhinitis is when the mucous membranes in the nose respond to allergens. Allergens are particles in the air that cause your body to have an allergic reaction. This causes you to release allergic antibodies. Through a chain of events, these eventually cause you to release histamine into the blood stream. Although meant to protect the body, it is this release of histamine that causes your discomfort, such as frequent sneezing, congestion, and an itchy, runny nose.   CAUSES  Seasonal allergic rhinitis (hay fever) is caused by pollen allergens that may come from grasses, trees, and weeds. Year-round allergic rhinitis (perennial allergic rhinitis) is caused by allergens such as house dust mites, pet dander, and mold spores.  SYMPTOMS  · Nasal stuffiness (congestion).  · Itchy, runny nose with sneezing and tearing of the eyes.  DIAGNOSIS  Your health care provider can help you determine the allergen or allergens that trigger your symptoms. If you and your health care provider are unable to determine the allergen, skin or blood testing may be used. Your health care provider will diagnose your condition after taking your health history and performing a physical exam. Your health care provider may assess you for other related conditions, such as asthma, pink eye, or an ear infection.  TREATMENT  Allergic rhinitis does not have a cure, but it can be controlled by:  · Medicines that block allergy symptoms. These may include allergy shots, nasal sprays, and oral antihistamines.  · Avoiding the allergen.  Hay fever may often be treated with antihistamines in pill or nasal spray forms. Antihistamines block the effects of histamine. There are over-the-counter medicines that may help with nasal congestion and swelling around the eyes. Check with your health care provider before taking or giving this medicine.  If avoiding the allergen or the medicine prescribed do not work, there are many new medicines  your health care provider can prescribe. Stronger medicine may be used if initial measures are ineffective. Desensitizing injections can be used if medicine and avoidance does not work. Desensitization is when a patient is given ongoing shots until the body becomes less sensitive to the allergen. Make sure you follow up with your health care provider if problems continue.  HOME CARE INSTRUCTIONS  It is not possible to completely avoid allergens, but you can reduce your symptoms by taking steps to limit your exposure to them. It helps to know exactly what you are allergic to so that you can avoid your specific triggers.  SEEK MEDICAL CARE IF:  · You have a fever.  · You develop a cough that does not stop easily (persistent).  · You have shortness of breath.  · You start wheezing.  · Symptoms interfere with normal daily activities.     This information is not intended to replace advice given to you by your health care provider. Make sure you discuss any questions you have with your health care provider.     Document Released: 09/12/2002 Document Revised: 01/08/2016 Document Reviewed: 08/25/2014  [employer] Interactive Patient Education ©2016 [employer]

## 2017-05-31 NOTE — PROGRESS NOTES
Subjective:     Chief Complaint   Patient presents with   • Hospital Follow-up       Joie Hart is a 71 y.o. female here today for evaluation and management of:    Persistent cough for 3 weeks or longer  Started with cough 6 months ago that is non-productive.  No SOB.  These symptoms waxed and waned.  She would cough 2-3 days a week.  Then on 5/19 sore throat, laryngitis, cough, low grade fever and yellow rhinorrhea.  She had ear congestion.  Symptoms negative for night sweats, facial pain, swollen glands, hemoptysis  Treatments tried: Zithromax didn't seem to help her symptoms.  She has a sore throat still.   Since onset, symptoms are worse   Similarly ill exposures: no  Medical history negative for asthma.  She has had reactions to evergreens and has had hives  She  reports that she quit smoking about 36 years ago. Her smoking use included Cigarettes. She has a 25 pack-year smoking history. She has never used smokeless tobacco.         Allergies   Allergen Reactions   • Codeine Rash     Rash all over           Current medicines (including changes today)  Current Outpatient Prescriptions   Medication Sig Dispense Refill   • acetaminophen (TYLENOL) 500 MG Tab Take 500-1,000 mg by mouth every 6 hours as needed.     • Omega-3 Fatty Acids (FISH OIL) 1000 MG Cap capsule Take 1,000 mg by mouth 3 times a day, with meals.     • fluticasone-salmeterol (ADVAIR) 250-50 MCG/DOSE AEROSOL POWDER, BREATH ACTIVATED Inhale 1 Puff by mouth every 12 hours. 1 Inhaler 1   • cyanocobalamin (VITAMIN B-12) 500 MCG Tab Take 500 mcg by mouth every day.     • coenzyme Q-10 30 MG capsule Take 60 mg by mouth every day.     • tramadol (ULTRAM) 50 MG Tab TAKE 1 TABLET BY MOUTH FOUR TIMES DAILY AS NEEDED FOR PAIN 60 Tab 0   • amitriptyline (ELAVIL) 150 MG Tab Take 150 mg by mouth every evening.     • VITAMIN A PO Take  by mouth.     • therapeutic multivitamin-minerals (THERAGRAN-M) Tab Take 1 Tab by mouth every day.     • Probiotic Product  (PROBIOTIC DAILY PO) Take  by mouth.     • levothyroxine (SYNTHROID) 75 MCG Tab Take 75 mcg by mouth Every morning on an empty stomach.     • azithromycin (ZITHROMAX) 250 MG Tab Take two tabs by mouth on day one, then one tab by mouth daily on days 2-5. 6 Tab 0   • benzonatate (TESSALON) 100 MG Cap Take 1 Cap by mouth 3 times a day as needed for Cough. 60 Cap 0     No current facility-administered medications for this visit.       She  has a past medical history of Depression (2009); Chronic back pain; Pelvic fracture (CMS-[facility]) (1976); Thyroid disease; Spondylisthesis; Head injury, closed (3/6/16); Diverticulitis; Vaginal vault prolapse (2006); HSV infection; and Cataract.    Patient Active Problem List    Diagnosis Date Noted   • Persistent cough for 3 weeks or longer 05/26/2017   • Pharyngitis 05/26/2017   • Seasonal allergic rhinitis due to pollen 05/26/2017   • B12 deficiency 12/20/2016   • Chronic back pain    • Spondylisthesis    • Acquired cyst of kidney 03/23/2016   • Lymphocytic thyroiditis 03/23/2016   • Insomnia 12/06/2013   • Herpes simplex virus (HSV) infection 11/07/2012   • HLD (hyperlipidemia) 11/07/2012   • Chronic urticaria 11/07/2012   • Osteopenia 11/06/2012       ROS   No fever or chills.  No nausea or vomiting.  No chest pain or palpitations. .  No pain with urination or hematuria.  No black or bloody stools.       Objective:     Blood pressure 130/80, pulse 90, temperature 37 °C (98.6 °F), height 1.524 m (5'), weight 59.421 kg (131 lb), SpO2 98 %, not currently breastfeeding. Body mass index is 25.58 kg/(m^2).   Physical Exam:  Well developed, well nourished.  Alert, oriented in no acute distress.  Eye contact is good, speech goal directed, affect calm  Eyes: conjunctiva non-injected, sclera non-icteric.  Ears: Pinna normal. TM pearly gray.   Nose: Nares are patent.  Pale, boggy mucosa without discharge  Mouth: Oral mucous membranes pink and moist with no lesions. Moderate diffuse erythema of  the posterior pharynx  Neck Supple.  No adenopathy or masses in the neck or supraclavicular regions. No thyromegaly  Lungs: clear to auscultation bilaterally with good excursion. Occasional expiratory wheezes but no rhonchi. No increased work of breathing  CV: regular rate and rhythm. No murmur        Assessment and Plan:   The following treatment plan was discussed    1. Persistent cough for 3 weeks or longer    Trial of Advair twice a day for a month. Refer to allergist if not improving  - fluticasone-salmeterol (ADVAIR) 250-50 MCG/DOSE AEROSOL POWDER, BREATH ACTIVATED; Inhale 1 Puff by mouth every 12 hours.  Dispense: 1 Inhaler; Refill: 1    2. Pharyngitis, unspecified etiology  Negative strep. Most likely secondary to allergies.  - POCT Rapid Strep A    3. Seasonal allergic rhinitis due to pollen    - fluticasone-salmeterol (ADVAIR) 250-50 MCG/DOSE AEROSOL POWDER, BREATH ACTIVATED; Inhale 1 Puff by mouth every 12 hours.  Dispense: 1 Inhaler; Refill: 1    Any change or worsening of signs or symptoms, patient encouraged to follow-up or report to the emergency room for further evaluation. Patient understands and agrees.    Followup: Return in about 3 months (around 8/26/2017).

## 2017-06-15 ENCOUNTER — OFFICE VISIT (OUTPATIENT)
Dept: MEDICAL GROUP | Facility: MEDICAL CENTER | Age: 72
End: 2017-06-15
Payer: MEDICARE

## 2017-06-15 VITALS
TEMPERATURE: 98.9 F | OXYGEN SATURATION: 96 % | WEIGHT: 132 LBS | HEART RATE: 101 BPM | BODY MASS INDEX: 25.91 KG/M2 | SYSTOLIC BLOOD PRESSURE: 142 MMHG | HEIGHT: 60 IN | DIASTOLIC BLOOD PRESSURE: 82 MMHG

## 2017-06-15 DIAGNOSIS — J45.901 ALLERGIC BRONCHITIS WITH ACUTE EXACERBATION: ICD-10-CM

## 2017-06-15 DIAGNOSIS — J30.1 SEASONAL ALLERGIC RHINITIS DUE TO POLLEN: ICD-10-CM

## 2017-06-15 PROCEDURE — 99214 OFFICE O/P EST MOD 30 MIN: CPT | Performed by: FAMILY MEDICINE

## 2017-06-15 RX ORDER — BENZONATATE 100 MG/1
100 CAPSULE ORAL 3 TIMES DAILY PRN
Qty: 60 CAP | Refills: 0 | Status: SHIPPED | OUTPATIENT
Start: 2017-06-15 | End: 2017-07-25

## 2017-06-15 NOTE — ASSESSMENT & PLAN NOTE
Still coughing.  It worsens when she goes outside and works in the garden.  She had a negative chest xray on 5/22/17.  Her symptoms improve when she stays indoor but she is still coughing.  Overall she's feeling that much better. She is scheduled to go see Álvaro Cartagena at an outdoor concert and would like me to fill out paperwork because she does not feel like she can go. She denies any fever or chills. No shortness of breath.

## 2017-06-20 NOTE — PROGRESS NOTES
Subjective:     Chief Complaint   Patient presents with   • Follow-Up     insurance form   • Skin Lesion     on left forearm       Joie Hart is a 71 y.o. female here today for evaluation and management of:    Allergic bronchitis with acute exacerbation  Still coughing.  It worsens when she goes outside and works in the garden.  She had a negative chest xray on 5/22/17.  Her symptoms improve when she stays indoor but she is still coughing.  Overall she's feeling that much better. She is scheduled to go see Álvaro Cartagena at an outdoor concert and would like me to fill out paperwork because she does not feel like she can go. She denies any fever or chills. No shortness of breath.       Allergies   Allergen Reactions   • Codeine Rash     Rash all over           Current medicines (including changes today)  Current Outpatient Prescriptions   Medication Sig Dispense Refill   • benzonatate (TESSALON) 100 MG Cap Take 1 Cap by mouth 3 times a day as needed for Cough. 60 Cap 0   • acetaminophen (TYLENOL) 500 MG Tab Take 500-1,000 mg by mouth every 6 hours as needed.     • Omega-3 Fatty Acids (FISH OIL) 1000 MG Cap capsule Take 1,000 mg by mouth 3 times a day, with meals.     • fluticasone-salmeterol (ADVAIR) 250-50 MCG/DOSE AEROSOL POWDER, BREATH ACTIVATED Inhale 1 Puff by mouth every 12 hours. 1 Inhaler 1   • cyanocobalamin (VITAMIN B-12) 500 MCG Tab Take 500 mcg by mouth every day.     • coenzyme Q-10 30 MG capsule Take 60 mg by mouth every day.     • tramadol (ULTRAM) 50 MG Tab TAKE 1 TABLET BY MOUTH FOUR TIMES DAILY AS NEEDED FOR PAIN 60 Tab 0   • amitriptyline (ELAVIL) 150 MG Tab Take 150 mg by mouth every evening.     • VITAMIN A PO Take  by mouth.     • therapeutic multivitamin-minerals (THERAGRAN-M) Tab Take 1 Tab by mouth every day.     • Probiotic Product (PROBIOTIC DAILY PO) Take  by mouth.     • levothyroxine (SYNTHROID) 75 MCG Tab Take 75 mcg by mouth Every morning on an empty stomach.     • azithromycin  (ZITHROMAX) 250 MG Tab Take two tabs by mouth on day one, then one tab by mouth daily on days 2-5. 6 Tab 0     No current facility-administered medications for this visit.       She  has a past medical history of Depression (2009); Chronic back pain; Pelvic fracture (CMS-[facility]CC) (1976); Thyroid disease; Spondylisthesis; Head injury, closed (3/6/16); Diverticulitis; Vaginal vault prolapse (2006); HSV infection; and Cataract.    Patient Active Problem List    Diagnosis Date Noted   • Allergic bronchitis with acute exacerbation 05/26/2017   • Pharyngitis 05/26/2017   • Seasonal allergic rhinitis due to pollen 05/26/2017   • B12 deficiency 12/20/2016   • Chronic back pain    • Spondylisthesis    • Acquired cyst of kidney 03/23/2016   • Lymphocytic thyroiditis 03/23/2016   • Insomnia 12/06/2013   • Herpes simplex virus (HSV) infection 11/07/2012   • HLD (hyperlipidemia) 11/07/2012   • Chronic urticaria 11/07/2012   • Osteopenia 11/06/2012       ROS   No fever or chills.  No nausea or vomiting.  No chest pain or palpitations.  No cough or SOB.  No pain with urination or hematuria.  No black or bloody stools.       Objective:     Blood pressure 142/82, pulse 101, temperature 37.2 °C (98.9 °F), height 1.524 m (5'), weight 59.875 kg (132 lb), SpO2 96 %, not currently breastfeeding. Body mass index is 25.78 kg/(m^2).   Physical Exam:  Well developed, well nourished.  Alert, oriented in no acute distress.  Eye contact is good, speech goal directed, affect calm  Eyes: conjunctiva non-injected, sclera non-icteric.  Ears: Pinna normal. TM pearly gray.   Nose: Nares are patent. Pale, boggy mucosa without discharge  Mouth: Oral mucous membranes pink and moist with no lesions. Mild diffuse posterior pharynx erythema Neck Supple.  No adenopathy or masses in the neck or supraclavicular regions. No thyromegaly  Lungs: clear to auscultation bilaterally with good excursion. No wheezes or rhonchi  CV: regular rate and rhythm. No  murmur        Assessment and Plan:   The following treatment plan was discussed    1. Allergic bronchitis with acute exacerbation  Refill Tessalon. Insurance paperwork filled out. Discussed avoiding times a day when pollen counts are highest. Continue to drink plenty of fluids. Allegra or Claritin daily . Continue inhaler for one month.  - benzonatate (TESSALON) 100 MG Cap; Take 1 Cap by mouth 3 times a day as needed for Cough.  Dispense: 60 Cap; Refill: 0    2. Seasonal allergic rhinitis due to pollen  See #1    Any change or worsening of signs or symptoms, patient encouraged to follow-up or report to the emergency room for further evaluation. Patient understands and agrees.    Followup: Return if symptoms worsen or fail to improve.

## 2017-07-25 PROBLEM — M54.40 CHRONIC LEFT-SIDED LOW BACK PAIN WITH SCIATICA: Status: ACTIVE | Noted: 2017-07-25

## 2017-07-25 PROBLEM — G89.29 CHRONIC LEFT-SIDED LOW BACK PAIN WITH SCIATICA: Status: ACTIVE | Noted: 2017-07-25

## 2017-10-05 ENCOUNTER — OFFICE VISIT (OUTPATIENT)
Dept: MEDICAL GROUP | Facility: MEDICAL CENTER | Age: 72
End: 2017-10-05
Payer: MEDICARE

## 2017-10-05 VITALS
BODY MASS INDEX: 25.91 KG/M2 | DIASTOLIC BLOOD PRESSURE: 88 MMHG | HEART RATE: 77 BPM | SYSTOLIC BLOOD PRESSURE: 138 MMHG | OXYGEN SATURATION: 97 % | WEIGHT: 132 LBS | RESPIRATION RATE: 16 BRPM | HEIGHT: 60 IN | TEMPERATURE: 97.5 F

## 2017-10-05 DIAGNOSIS — G44.89 OTHER HEADACHE SYNDROME: ICD-10-CM

## 2017-10-05 DIAGNOSIS — G93.9 CHRONIC NON-SPECIFIC WHITE MATTER LESIONS ON MRI: ICD-10-CM

## 2017-10-05 PROBLEM — G44.229 CHRONIC TENSION-TYPE HEADACHE, NOT INTRACTABLE: Status: ACTIVE | Noted: 2017-10-05

## 2017-10-05 PROBLEM — R90.82 WHITE MATTER DISEASE, UNSPECIFIED: Status: ACTIVE | Noted: 2017-10-05

## 2017-10-05 PROCEDURE — 99214 OFFICE O/P EST MOD 30 MIN: CPT | Performed by: FAMILY MEDICINE

## 2017-10-05 NOTE — ASSESSMENT & PLAN NOTE
Reports  generalized headaches described as: poorly described with no nausea, emesis or aura, without radiation.  She has noticed some photophobia  Usual frequency is 3 times a week.  They are ocuring more frequent  Started August 2016 after a head injury  Headaches are relieved by darkening the room, ice pack.   Denies difficulty with speech or swallowing, facial numbness or tingling, focal weakness. Denies sore throat or cough, fever, sinus congestion, ear pain, tooth clenching or grinding.    Prior imaging: yes - CT and MRI

## 2017-10-12 NOTE — PROGRESS NOTES
Subjective:     Chief Complaint   Patient presents with   • Headache       Joie Hart is a 72 y.o. female here today for evaluation and management of:    Other headache syndrome  Reports  generalized headaches described as: poorly described with no nausea, emesis or aura, without radiation.  She has noticed some photophobia  Usual frequency is 3 times a week.  They are ocuring more frequent  Started August 2016 after a head injury  Headaches are relieved by darkening the room, ice pack.   Denies difficulty with speech or swallowing, facial numbness or tingling, focal weakness. Denies sore throat or cough, fever, sinus congestion, ear pain, tooth clenching or grinding.    Prior imaging: yes - CT and MRI             Allergies   Allergen Reactions   • Codeine Rash     Rash all over           Current medicines (including changes today)  Current Outpatient Prescriptions   Medication Sig Dispense Refill   • acetaminophen (TYLENOL) 500 MG Tab Take 500-1,000 mg by mouth 2 Times a Day.     • Omega-3 Fatty Acids (FISH OIL) 1000 MG Cap capsule Take 1,000 mg by mouth 3 times a day, with meals.     • cyanocobalamin (VITAMIN B-12) 500 MCG Tab Take 500 mcg by mouth every day.     • coenzyme Q-10 30 MG capsule Take 60 mg by mouth every day.     • amitriptyline (ELAVIL) 150 MG Tab Take 150 mg by mouth every evening.     • VITAMIN A PO Take  by mouth.     • therapeutic multivitamin-minerals (THERAGRAN-M) Tab Take 1 Tab by mouth every day.     • Probiotic Product (PROBIOTIC DAILY PO) Take  by mouth.     • levothyroxine (SYNTHROID) 75 MCG Tab Take 75 mcg by mouth Every morning on an empty stomach.     • tramadol (ULTRAM) 50 MG Tab TAKE 1 TABLET BY MOUTH FOUR TIMES DAILY AS NEEDED FOR PAIN 60 Tab 0     No current facility-administered medications for this visit.        She  has a past medical history of Allergy; Anemia; Anxiety; Cataract; Chronic back pain; Depression (2009); Diverticulitis; Glaucoma; Head injury, closed (3/6/16);  HSV infection; IBD (inflammatory bowel disease); Migraine; Pelvic fracture (CMS-[facility]CC) (1976); Spondylisthesis; Thyroid disease; and Vaginal vault prolapse (2006).    Patient Active Problem List    Diagnosis Date Noted   • Other headache syndrome 10/05/2017   • Chronic non-specific white matter lesions on MRI 10/05/2017   • Chronic left-sided low back pain with sciatica 07/25/2017   • Allergic bronchitis with acute exacerbation 05/26/2017   • Pharyngitis 05/26/2017   • Seasonal allergic rhinitis due to pollen 05/26/2017   • B12 deficiency 12/20/2016   • Chronic back pain    • Spondylisthesis    • Acquired cyst of kidney 03/23/2016   • Lymphocytic thyroiditis 03/23/2016   • Insomnia 12/06/2013   • Herpes simplex virus (HSV) infection 11/07/2012   • HLD (hyperlipidemia) 11/07/2012   • Chronic urticaria 11/07/2012   • Osteopenia 11/06/2012       ROS   No fever or chills.  No nausea or vomiting.  No chest pain or palpitations.  No cough or SOB.  No pain with urination or hematuria.  No black or bloody stools.       Objective:     Blood pressure 138/88, pulse 77, temperature 36.4 °C (97.5 °F), resp. rate 16, height 1.524 m (5'), weight 59.9 kg (132 lb), SpO2 97 %. Body mass index is 25.78 kg/m².   Physical Exam:  Well developed, well nourished.  Alert, oriented in no acute distress.  Eye contact is good, speech goal directed, affect calm  Eyes: conjunctiva non-injected, sclera non-icteric.PERRL. EOMs intact  Ears: Pinna normal. TM pearly gray.   Nose: Nares are patent.  Normal mucosa  Mouth: Oral mucous membranes pink and moist with no lesions.  Neck Supple.  No adenopathy or masses in the neck or supraclavicular regions. No thyromegaly  Lungs: clear to auscultation bilaterally with good excursion. No wheezes or rhonchi  CV: regular rate and rhythm. No murmur  Neurological: Alert and oriented x 3. DTRs 2+ and symmetric. No cranial nerve deficit. Strength and sensation intact. Negative Rhomberg. No dysdiadochokinesia.        Assessment and Plan:   The following treatment plan was discussed    1. Other headache syndrome  Refer to neurology for further evaluation and treatment. I did review the CT scan and MRI with the patient and her .  - REFERRAL TO NEUROLOGY    2. Chronic non-specific white matter lesions on MRI  See #1  - REFERRAL TO NEUROLOGY    Any change or worsening of signs or symptoms, patient encouraged to follow-up or report to the emergency room for further evaluation. Patient understands and agrees.    Followup: Return if symptoms worsen or fail to improve.

## 2017-12-15 ENCOUNTER — OFFICE VISIT (OUTPATIENT)
Dept: MEDICAL GROUP | Facility: MEDICAL CENTER | Age: 72
End: 2017-12-15
Payer: MEDICARE

## 2017-12-15 VITALS
DIASTOLIC BLOOD PRESSURE: 82 MMHG | TEMPERATURE: 98.1 F | WEIGHT: 130.4 LBS | BODY MASS INDEX: 25.6 KG/M2 | HEIGHT: 60 IN | SYSTOLIC BLOOD PRESSURE: 126 MMHG | HEART RATE: 96 BPM | OXYGEN SATURATION: 97 %

## 2017-12-15 DIAGNOSIS — R19.7 DIARRHEA, UNSPECIFIED TYPE: ICD-10-CM

## 2017-12-15 PROCEDURE — 99214 OFFICE O/P EST MOD 30 MIN: CPT | Performed by: FAMILY MEDICINE

## 2017-12-16 NOTE — ASSESSMENT & PLAN NOTE
On 12/7/17 she had her first  loose stool. She has had about 3 loose stools a day. She otherwise feels well. She hasn't had any nausea or vomiting. She has had a slight decrease in appetite just because she is annoyed by the loose stools. She denies any fever or chills. No black or bloody stools. It is limiting her activities because she has urgency. She denies any significant abdominal pain. No recent foreign travel.

## 2017-12-22 LAB
BACTERIA SPEC CULT: NORMAL
BACTERIA SPEC CULT: NORMAL
CAMPYLOBACTER STL CULT: NORMAL
CRYPTOSP AG STL QL IA: NEGATIVE
E COLI SXT STL QL IA: NEGATIVE
G LAMBLIA AG STL QL IA: NEGATIVE
SALM + SHIG STL CULT: NORMAL

## 2017-12-26 NOTE — PROGRESS NOTES
Subjective:     Chief Complaint   Patient presents with   • Gastroenteritis       Joie Hart is a 72 y.o. female here today for evaluation and management of:    Diarrhea  On 12/7/17 she had her first  loose stool. She has had about 3 loose stools a day. She otherwise feels well. She hasn't had any nausea or vomiting. She has had a slight decrease in appetite just because she is annoyed by the loose stools. She denies any fever or chills. No black or bloody stools. It is limiting her activities because she has urgency. She denies any significant abdominal pain. No recent foreign travel.       Allergies   Allergen Reactions   • Codeine Rash     Rash all over           Current medicines (including changes today)  Current Outpatient Prescriptions   Medication Sig Dispense Refill   • acetaminophen (TYLENOL) 500 MG Tab Take 500-1,000 mg by mouth 2 Times a Day.     • Omega-3 Fatty Acids (FISH OIL) 1000 MG Cap capsule Take 1,000 mg by mouth 3 times a day, with meals.     • cyanocobalamin (VITAMIN B-12) 500 MCG Tab Take 500 mcg by mouth every day.     • coenzyme Q-10 30 MG capsule Take 60 mg by mouth every day.     • amitriptyline (ELAVIL) 150 MG Tab Take 150 mg by mouth every evening.     • therapeutic multivitamin-minerals (THERAGRAN-M) Tab Take 1 Tab by mouth every day.     • Probiotic Product (PROBIOTIC DAILY PO) Take  by mouth.     • levothyroxine (SYNTHROID) 75 MCG Tab Take 75 mcg by mouth Every morning on an empty stomach.     • tramadol (ULTRAM) 50 MG Tab TAKE 1 TABLET BY MOUTH FOUR TIMES DAILY AS NEEDED FOR PAIN 60 Tab 0   • VITAMIN A PO Take  by mouth.       No current facility-administered medications for this visit.        She  has a past medical history of Allergy; Anemia; Anxiety; Cataract; Chronic back pain; Depression (2009); Diverticulitis; Glaucoma; Head injury, closed (3/6/16); HSV infection; IBD (inflammatory bowel disease); Migraine; Pelvic fracture (CMS-[facility]) (1976); Spondylisthesis; Thyroid  disease; and Vaginal vault prolapse (2006).    Patient Active Problem List    Diagnosis Date Noted   • Diarrhea 12/15/2017   • Other headache syndrome 10/05/2017   • Chronic non-specific white matter lesions on MRI 10/05/2017   • Chronic left-sided low back pain with sciatica 07/25/2017   • Allergic bronchitis with acute exacerbation 05/26/2017   • Pharyngitis 05/26/2017   • Seasonal allergic rhinitis due to pollen 05/26/2017   • B12 deficiency 12/20/2016   • Chronic back pain    • Spondylisthesis    • Acquired cyst of kidney 03/23/2016   • Lymphocytic thyroiditis 03/23/2016   • Insomnia 12/06/2013   • Herpes simplex virus (HSV) infection 11/07/2012   • HLD (hyperlipidemia) 11/07/2012   • Chronic urticaria 11/07/2012   • Osteopenia 11/06/2012       ROS   No fever or chills.  No nausea or vomiting.  No chest pain or palpitations.  No cough or SOB.  No pain with urination or hematuria.  No black or bloody stools.       Objective:     Blood pressure 126/82, pulse 96, temperature 36.7 °C (98.1 °F), height 1.524 m (5'), weight 59.1 kg (130 lb 6.4 oz), SpO2 97 %, not currently breastfeeding. Body mass index is 25.47 kg/m².   Physical Exam:  Well developed, well nourished.  Alert, oriented in no acute distress.  Eye contact is good, speech goal directed, affect calm  Eyes: conjunctiva non-injected, sclera non-icteric.  Ears: Pinna normal. TM pearly gray.   Nose: Nares are patent.  Normal mucosa  Mouth: Oral mucous membranes pink and moist with no lesions.  Neck Supple.  No adenopathy or masses in the neck or supraclavicular regions. No thyromegaly  Lungs: clear to auscultation bilaterally with good excursion. No wheezes or rhonchi  CV: regular rate and rhythm. No murmur  Abdomen: soft, nontender, no masses or organomegaly.  No rebound or guarding  Skin: No jaundice          Assessment and Plan:   The following treatment plan was discussed    1. Diarrhea, unspecified type  Discussed that this could be viral in nature. We  will check a hepatitis A as well as some stool cultures. Await the results  - HEPATITIS A AB IGM+TOTAL; Future  - CRYPTO/GIARDIA RAPID ASSAY; Future  - CULTURE STOOL; Future    Any change or worsening of signs or symptoms, patient encouraged to follow-up or report to the emergency room for further evaluation. Patient understands and agrees.    Followup: Return if symptoms worsen or fail to improve.

## 2017-12-29 ENCOUNTER — OFFICE VISIT (OUTPATIENT)
Dept: URGENT CARE | Facility: CLINIC | Age: 72
End: 2017-12-29
Payer: MEDICARE

## 2017-12-29 ENCOUNTER — TELEPHONE (OUTPATIENT)
Dept: MEDICAL GROUP | Facility: MEDICAL CENTER | Age: 72
End: 2017-12-29

## 2017-12-29 VITALS
BODY MASS INDEX: 25.72 KG/M2 | RESPIRATION RATE: 20 BRPM | HEIGHT: 60 IN | HEART RATE: 104 BPM | SYSTOLIC BLOOD PRESSURE: 112 MMHG | DIASTOLIC BLOOD PRESSURE: 70 MMHG | WEIGHT: 131 LBS | OXYGEN SATURATION: 98 % | TEMPERATURE: 98.1 F

## 2017-12-29 DIAGNOSIS — G89.29 CHRONIC LEFT-SIDED LOW BACK PAIN WITH LEFT-SIDED SCIATICA: ICD-10-CM

## 2017-12-29 DIAGNOSIS — K57.92 ACUTE DIVERTICULITIS: ICD-10-CM

## 2017-12-29 DIAGNOSIS — R10.30 LOWER ABDOMINAL PAIN: ICD-10-CM

## 2017-12-29 DIAGNOSIS — A09 INFECTIOUS COLITIS: ICD-10-CM

## 2017-12-29 DIAGNOSIS — M54.42 CHRONIC LEFT-SIDED LOW BACK PAIN WITH LEFT-SIDED SCIATICA: ICD-10-CM

## 2017-12-29 LAB
APPEARANCE UR: CLEAR
BILIRUB UR STRIP-MCNC: NORMAL MG/DL
COLOR UR AUTO: NORMAL
GLUCOSE UR STRIP.AUTO-MCNC: NORMAL MG/DL
KETONES UR STRIP.AUTO-MCNC: NORMAL MG/DL
LEUKOCYTE ESTERASE UR QL STRIP.AUTO: NORMAL
NITRITE UR QL STRIP.AUTO: NORMAL
PH UR STRIP.AUTO: 6 [PH] (ref 5–8)
PROT UR QL STRIP: NORMAL MG/DL
RBC UR QL AUTO: NORMAL
SP GR UR STRIP.AUTO: 1
UROBILINOGEN UR STRIP-MCNC: NORMAL MG/DL

## 2017-12-29 PROCEDURE — 99213 OFFICE O/P EST LOW 20 MIN: CPT | Performed by: FAMILY MEDICINE

## 2017-12-29 PROCEDURE — 81002 URINALYSIS NONAUTO W/O SCOPE: CPT | Performed by: FAMILY MEDICINE

## 2017-12-29 RX ORDER — CIPROFLOXACIN 500 MG/1
500 TABLET, FILM COATED ORAL 2 TIMES DAILY
Qty: 20 TAB | Refills: 0 | Status: SHIPPED | OUTPATIENT
Start: 2017-12-29 | End: 2018-01-08

## 2017-12-29 ASSESSMENT — PAIN SCALES - GENERAL: PAINLEVEL: 4=SLIGHT-MODERATE PAIN

## 2017-12-29 NOTE — TELEPHONE ENCOUNTER
Was the patient seen in the last year in this department? Yes     Does patient have an active prescription for medications requested? No     Received Request Via: Patient     Pt called and is requesting to have you manage these medications now as she use to receive these through her previous PCP in [facility]. Pt states she take the Ultram for Migraines.

## 2017-12-29 NOTE — TELEPHONE ENCOUNTER
1. Caller Name: Joie Hart                                         Call Back Number: [phone]      Patient approves a detailed voicemail message: yes    Pt called to report she is having symptoms of food poisoning and is feeling awful. Based on Pt's symptoms, advised Pt to go to UC. Added Pt to the [facility] line.

## 2017-12-30 ENCOUNTER — HOSPITAL ENCOUNTER (OUTPATIENT)
Dept: LAB | Facility: MEDICAL CENTER | Age: 72
End: 2017-12-30
Attending: NURSE PRACTITIONER
Payer: MEDICARE

## 2017-12-30 ENCOUNTER — HOSPITAL ENCOUNTER (OUTPATIENT)
Dept: RADIOLOGY | Facility: MEDICAL CENTER | Age: 72
End: 2017-12-30
Attending: FAMILY MEDICINE
Payer: MEDICARE

## 2017-12-30 DIAGNOSIS — R10.32 LEFT LOWER QUADRANT PAIN: ICD-10-CM

## 2017-12-30 DIAGNOSIS — R10.30 LOWER ABDOMINAL PAIN: ICD-10-CM

## 2017-12-30 DIAGNOSIS — K57.92 ACUTE DIVERTICULITIS: ICD-10-CM

## 2017-12-30 LAB
BUN SERPL-MCNC: 9 MG/DL (ref 8–22)
CREAT SERPL-MCNC: 0.68 MG/DL (ref 0.5–1.4)
GFR SERPL CREATININE-BSD FRML MDRD: >60 ML/MIN/1.73 M 2

## 2017-12-30 PROCEDURE — 700117 HCHG RX CONTRAST REV CODE 255: Performed by: FAMILY MEDICINE

## 2017-12-30 PROCEDURE — 82565 ASSAY OF CREATININE: CPT

## 2017-12-30 PROCEDURE — 36415 COLL VENOUS BLD VENIPUNCTURE: CPT

## 2017-12-30 PROCEDURE — 74177 CT ABD & PELVIS W/CONTRAST: CPT

## 2017-12-30 PROCEDURE — 84520 ASSAY OF UREA NITROGEN: CPT

## 2017-12-30 RX ADMIN — IOHEXOL 100 ML: 350 INJECTION, SOLUTION INTRAVENOUS at 16:49

## 2017-12-30 ASSESSMENT — ENCOUNTER SYMPTOMS
ABDOMINAL PAIN: 1
PSYCHIATRIC NEGATIVE: 1
EYES NEGATIVE: 1
CONSTIPATION: 1
SORE THROAT: 0
DIZZINESS: 0
MUSCULOSKELETAL NEGATIVE: 1
FEVER: 0
RESPIRATORY NEGATIVE: 1
DIARRHEA: 1
HEARTBURN: 0
CARDIOVASCULAR NEGATIVE: 1
BLOOD IN STOOL: 0
CHILLS: 0
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
## 2018-01-01 ENCOUNTER — TELEPHONE (OUTPATIENT)
Dept: URGENT CARE | Facility: CLINIC | Age: 73
End: 2018-01-01

## 2018-01-01 NOTE — TELEPHONE ENCOUNTER
1. Caller Name: Joie Hart                                          Call Back Number: [phone] (home)        Patient approves a detailed voicemail message: N\A    Patient called to inform provider at provider's request that she is feeling a little better but not a lot better. She is not sure if she should be continuing with antibiotics. Please advise.

## 2018-01-01 NOTE — TELEPHONE ENCOUNTER
Patient states she will come to office to  stool sample materials and has appointment with primary tomorrow but will keep UC provider updated.

## 2018-01-02 ENCOUNTER — TELEPHONE (OUTPATIENT)
Dept: MEDICAL GROUP | Facility: MEDICAL CENTER | Age: 73
End: 2018-01-02

## 2018-01-02 ENCOUNTER — HOSPITAL ENCOUNTER (OUTPATIENT)
Facility: MEDICAL CENTER | Age: 73
End: 2018-01-02
Attending: FAMILY MEDICINE
Payer: MEDICARE

## 2018-01-02 ENCOUNTER — OFFICE VISIT (OUTPATIENT)
Dept: MEDICAL GROUP | Facility: MEDICAL CENTER | Age: 73
End: 2018-01-02
Payer: MEDICARE

## 2018-01-02 VITALS
DIASTOLIC BLOOD PRESSURE: 78 MMHG | WEIGHT: 126 LBS | HEART RATE: 85 BPM | HEIGHT: 60 IN | SYSTOLIC BLOOD PRESSURE: 120 MMHG | TEMPERATURE: 97.6 F | RESPIRATION RATE: 16 BRPM | OXYGEN SATURATION: 98 % | BODY MASS INDEX: 24.74 KG/M2

## 2018-01-02 DIAGNOSIS — G89.29 CHRONIC LEFT-SIDED LOW BACK PAIN WITH LEFT-SIDED SCIATICA: ICD-10-CM

## 2018-01-02 DIAGNOSIS — G44.89 OTHER HEADACHE SYNDROME: ICD-10-CM

## 2018-01-02 DIAGNOSIS — A09 INFECTIOUS COLITIS: ICD-10-CM

## 2018-01-02 DIAGNOSIS — M54.42 CHRONIC LEFT-SIDED LOW BACK PAIN WITH LEFT-SIDED SCIATICA: ICD-10-CM

## 2018-01-02 DIAGNOSIS — A08.4 GASTROENTERITIS AND COLITIS, VIRAL: ICD-10-CM

## 2018-01-02 LAB
C DIFF DNA SPEC QL NAA+PROBE: NEGATIVE
C DIFF TOX GENS STL QL NAA+PROBE: NEGATIVE

## 2018-01-02 PROCEDURE — 99214 OFFICE O/P EST MOD 30 MIN: CPT | Performed by: FAMILY MEDICINE

## 2018-01-02 PROCEDURE — 87493 C DIFF AMPLIFIED PROBE: CPT

## 2018-01-02 RX ORDER — TRAMADOL HYDROCHLORIDE 50 MG/1
TABLET ORAL
Qty: 60 TAB | Refills: 0 | Status: SHIPPED | OUTPATIENT
Start: 2018-01-02 | End: 2018-02-02

## 2018-01-02 RX ORDER — AMITRIPTYLINE HYDROCHLORIDE 150 MG/1
150 TABLET ORAL NIGHTLY PRN
Qty: 30 TAB | Refills: 6 | Status: SHIPPED | OUTPATIENT
Start: 2018-01-02 | End: 2018-03-20 | Stop reason: SDUPTHER

## 2018-01-02 ASSESSMENT — PATIENT HEALTH QUESTIONNAIRE - PHQ9: CLINICAL INTERPRETATION OF PHQ2 SCORE: 0

## 2018-01-02 NOTE — TELEPHONE ENCOUNTER
Left message for patient to call office   Called patient because we have a script ready for  however patient needs to sign new control medication agreement before picking this up  She may come down and sign form and we then give her RX to take to pharmacy.

## 2018-01-03 NOTE — PATIENT INSTRUCTIONS
Viral Gastroenteritis  Viral gastroenteritis is also known as stomach flu. This condition affects the stomach and intestinal tract. It can cause sudden diarrhea and vomiting. The illness typically lasts 3 to 8 days. Most people develop an immune response that eventually gets rid of the virus. While this natural response develops, the virus can make you quite ill.  CAUSES   Many different viruses can cause gastroenteritis, such as rotavirus or noroviruses. You can catch one of these viruses by consuming contaminated food or water. You may also catch a virus by sharing utensils or other personal items with an infected person or by touching a contaminated surface.  SYMPTOMS   The most common symptoms are diarrhea and vomiting. These problems can cause a severe loss of body fluids (dehydration) and a body salt (electrolyte) imbalance. Other symptoms may include:  · Fever.  · Headache.  · Fatigue.  · Abdominal pain.  DIAGNOSIS   Your caregiver can usually diagnose viral gastroenteritis based on your symptoms and a physical exam. A stool sample may also be taken to test for the presence of viruses or other infections.  TREATMENT   This illness typically goes away on its own. Treatments are aimed at rehydration. The most serious cases of viral gastroenteritis involve vomiting so severely that you are not able to keep fluids down. In these cases, fluids must be given through an intravenous line (IV).  HOME CARE INSTRUCTIONS   · Drink enough fluids to keep your urine clear or pale yellow. Drink small amounts of fluids frequently and increase the amounts as tolerated.  · Ask your caregiver for specific rehydration instructions.  · Avoid:  ¨ Foods high in sugar.  ¨ Alcohol.  ¨ Carbonated drinks.  ¨ Tobacco.  ¨ Juice.  ¨ Caffeine drinks.  ¨ Extremely hot or cold fluids.  ¨ Fatty, greasy foods.  ¨ Too much intake of anything at one time.  ¨ Dairy products until 24 to 48 hours after diarrhea stops.  · You may consume probiotics.  Probiotics are active cultures of beneficial bacteria. They may lessen the amount and number of diarrheal stools in adults. Probiotics can be found in yogurt with active cultures and in supplements.  · Wash your hands well to avoid spreading the virus.  · Only take over-the-counter or prescription medicines for pain, discomfort, or fever as directed by your caregiver. Do not give aspirin to children. Antidiarrheal medicines are not recommended.  · Ask your caregiver if you should continue to take your regular prescribed and over-the-counter medicines.  · Keep all follow-up appointments as directed by your caregiver.  SEEK IMMEDIATE MEDICAL CARE IF:   · You are unable to keep fluids down.  · You do not urinate at least once every 6 to 8 hours.  · You develop shortness of breath.  · You notice blood in your stool or vomit. This may look like coffee grounds.  · You have abdominal pain that increases or is concentrated in one small area (localized).  · You have persistent vomiting or diarrhea.  · You have a fever.  · The patient is a child younger than 3 months, and he or she has a fever.  · The patient is a child older than 3 months, and he or she has a fever and persistent symptoms.  · The patient is a child older than 3 months, and he or she has a fever and symptoms suddenly get worse.  · The patient is a baby, and he or she has no tears when crying.  MAKE SURE YOU:   · Understand these instructions.  · Will watch your condition.  · Will get help right away if you are not doing well or get worse.     This information is not intended to replace advice given to you by your health care provider. Make sure you discuss any questions you have with your health care provider.     Document Released: 12/18/2006 Document Revised: 03/11/2013 Document Reviewed: 10/03/2012  [employer] Patient Education ©2016 [employer]

## 2018-01-04 NOTE — ASSESSMENT & PLAN NOTE
Patient was seen in urgent care on 12/29/17 for diarrhea. A CT scan was obtained which showed the following:  Diffuse colonic wall thickening although especially involving the transverse colon    Most likely diagnosis is infectious colitis including the possibility of pseudomembranous colitis stool culture was negative for Escherichia coli and Shigella. Giardia, cryptosporidium and C. difficile were also all negative. Patient reports that she continues to have some loose stools and is fatigued but is starting to feel better. She is able to keep fluids down. She denies any black or bloody stools. No vomiting. No current fevers.

## 2018-01-04 NOTE — PROGRESS NOTES
Subjective:     Chief Complaint   Patient presents with   • Follow-Up     UC follow up, diarrhea       Joie Hart is a 72 y.o. female here today for evaluation and management of:    Gastroenteritis and colitis, viral  Patient was seen in urgent care on 12/29/17 for diarrhea. A CT scan was obtained which showed the following:  Diffuse colonic wall thickening although especially involving the transverse colon    Most likely diagnosis is infectious colitis including the possibility of pseudomembranous colitis stool culture was negative for Escherichia coli and Shigella. Giardia, cryptosporidium and C. difficile were also all negative. Patient reports that she continues to have some loose stools and is fatigued but is starting to feel better. She is able to keep fluids down. She denies any black or bloody stools. No vomiting. No current fevers.      Chronic left-sided low back pain with sciatica  Patient is also requesting a refill of her tramadol. She uses this for the chronic low back pain. This seems to be working well for her and increase her functionality.  Chronic pain recheck:   Last dose of controlled substance: Last night  Chronic pain treated with tramadol taken 1-2 times a day    She  reports that she drinks about 6.0 oz of alcohol per week .  She  reports that she does not use drugs.    Consequences of Chronic Opiate therapy:  (5 A's)  Analgesia: Compared to no treatment or prior treatment, pain is currently improved  Activity: improved  Adverse Events: She denies constipation, dry mouth, itchy skin, nausea and sedation  Aberrant Behaviors: She reports she is taking medication as prescribed and is not veering from agreed treatment regimen. There have been no inappropriate refills or lost/stolen meds reported.   Affect/Mood: Pain is not impacting patient's mood.  Patient denies depression/anxiety.    Nonnarcotic treatments that are being used: tricyclic antidepressants.   Treatment goals  discussed.    Opioid Risk Score:0    Interpretation of Opioid Risk Score   Score 0-3 = Low risk of abuse. Do UDS at least once per year.  Score 4-7 = Moderate risk of abuse. Do UDS 1-4 times per year.  Score 8+ = High risk of abuse. Refer to specialist.    Last order of CONTROLLED SUBSTANCE TREATMENT AGREEMENT was found on 1/2/2018 from Orders Only on 1/2/2018     UDS Summary     Patient has no health maintenance due at this time        Most recent UDS reviewed today and is consistent with prescribed medications.    I have reviewed the medical records, the Prescription Monitoring Program and I have determined that controlled substance treatment is medically indicated.           Allergies   Allergen Reactions   • Codeine Rash     Rash all over           Current medicines (including changes today)  Current Outpatient Prescriptions   Medication Sig Dispense Refill   • amitriptyline (ELAVIL) 150 MG Tab Take 1 Tab by mouth at bedtime as needed. 30 Tab 6   • tramadol (ULTRAM) 50 MG Tab TAKE 1 TABLET BY MOUTH FOUR TIMES DAILY AS NEEDED FOR PAIN 60 Tab 0   • ciprofloxacin (CIPRO) 500 MG Tab Take 1 Tab by mouth 2 times a day for 10 days. 20 Tab 0   • acetaminophen (TYLENOL) 500 MG Tab Take 500-1,000 mg by mouth 2 Times a Day.     • Omega-3 Fatty Acids (FISH OIL) 1000 MG Cap capsule Take 1,000 mg by mouth 3 times a day, with meals.     • cyanocobalamin (VITAMIN B-12) 500 MCG Tab Take 500 mcg by mouth every day.     • coenzyme Q-10 30 MG capsule Take 60 mg by mouth every day.     • VITAMIN A PO Take  by mouth.     • therapeutic multivitamin-minerals (THERAGRAN-M) Tab Take 1 Tab by mouth every day.     • Probiotic Product (PROBIOTIC DAILY PO) Take  by mouth.     • levothyroxine (SYNTHROID) 75 MCG Tab Take 75 mcg by mouth Every morning on an empty stomach.       No current facility-administered medications for this visit.        She  has a past medical history of Allergy; Anemia; Anxiety; Cataract; Chronic back pain; Depression  (2009); Diverticulitis; Glaucoma; Head injury, closed (3/6/16); HSV infection; IBD (inflammatory bowel disease); Migraine; Pelvic fracture (CMS-[facility]) (1976); Spondylisthesis; Thyroid disease; and Vaginal vault prolapse (2006).    Patient Active Problem List    Diagnosis Date Noted   • Gastroenteritis and colitis, viral 01/02/2018   • Diarrhea 12/15/2017   • Other headache syndrome 10/05/2017   • Chronic non-specific white matter lesions on MRI 10/05/2017   • Chronic left-sided low back pain with sciatica 07/25/2017   • Allergic bronchitis with acute exacerbation 05/26/2017   • Pharyngitis 05/26/2017   • Seasonal allergic rhinitis due to pollen 05/26/2017   • B12 deficiency 12/20/2016   • Chronic back pain    • Spondylisthesis    • Acquired cyst of kidney 03/23/2016   • Lymphocytic thyroiditis 03/23/2016   • Insomnia 12/06/2013   • Herpes simplex virus (HSV) infection 11/07/2012   • HLD (hyperlipidemia) 11/07/2012   • Chronic urticaria 11/07/2012   • Osteopenia 11/06/2012       ROS   No fever or chills.    No chest pain or palpitations.  No cough or SOB.  No pain with urination or hematuria.  No black or bloody stools.       Objective:     Blood pressure 120/78, pulse 85, temperature 36.4 °C (97.6 °F), resp. rate 16, height 1.524 m (5'), weight 57.2 kg (126 lb), SpO2 98 %. Body mass index is 24.61 kg/m².   Physical Exam:  Well developed, well nourished.  Alert, oriented in no acute distress.  Eye contact is good, speech goal directed, affect calm  Eyes: conjunctiva non-injected, sclera non-icteric.  Ears: Pinna normal. TM pearly gray.   Nose: Nares are patent.  Normal mucosa  Mouth: Oral mucous membranes pink and moist with no lesions.  Neck Supple.  No adenopathy or masses in the neck or supraclavicular regions. No thyromegaly  Lungs: clear to auscultation bilaterally with good excursion. No wheezes or rhonchi  CV: regular rate and rhythm. No murmur  Abdomen: soft,Diffuse tenderness, no masses or organomegaly.  No  rebound or guarding. Bowel sounds in all 4 quadrants            Assessment and Plan:   The following treatment plan was discussed    1. Gastroenteritis and colitis, viral  Discussed David diet and slowly advancing food. Avoid dairy products and acidic foods until feeling better. Refer to GI if symptoms recur    2. Chronic left-sided low back pain with left-sided sciatica  Tramadol refill done. Follow-up in 3 months    Any change or worsening of signs or symptoms, patient encouraged to follow-up or report to the emergency room for further evaluation. Patient understands and agrees.    Followup: Return in about 3 months (around 4/2/2018), or if symptoms worsen or fail to improve.

## 2018-01-04 NOTE — ASSESSMENT & PLAN NOTE
Patient is also requesting a refill of her tramadol. She uses this for the chronic low back pain. This seems to be working well for her and increase her functionality.  Chronic pain recheck:   Last dose of controlled substance: Last night  Chronic pain treated with tramadol taken 1-2 times a day    She  reports that she drinks about 6.0 oz of alcohol per week .  She  reports that she does not use drugs.    Consequences of Chronic Opiate therapy:  (5 A's)  Analgesia: Compared to no treatment or prior treatment, pain is currently improved  Activity: improved  Adverse Events: She denies constipation, dry mouth, itchy skin, nausea and sedation  Aberrant Behaviors: She reports she is taking medication as prescribed and is not veering from agreed treatment regimen. There have been no inappropriate refills or lost/stolen meds reported.   Affect/Mood: Pain is not impacting patient's mood.  Patient denies depression/anxiety.    Nonnarcotic treatments that are being used: tricyclic antidepressants.   Treatment goals discussed.    Opioid Risk Score:0    Interpretation of Opioid Risk Score   Score 0-3 = Low risk of abuse. Do UDS at least once per year.  Score 4-7 = Moderate risk of abuse. Do UDS 1-4 times per year.  Score 8+ = High risk of abuse. Refer to specialist.    Last order of CONTROLLED SUBSTANCE TREATMENT AGREEMENT was found on 1/2/2018 from Orders Only on 1/2/2018     UDS Summary     Patient has no health maintenance due at this time        Most recent UDS reviewed today and is consistent with prescribed medications.    I have reviewed the medical records, the Prescription Monitoring Program and I have determined that controlled substance treatment is medically indicated.

## 2018-01-09 ENCOUNTER — OFFICE VISIT (OUTPATIENT)
Dept: NEUROLOGY | Facility: MEDICAL CENTER | Age: 73
End: 2018-01-09
Payer: MEDICARE

## 2018-01-09 VITALS
HEIGHT: 60 IN | OXYGEN SATURATION: 97 % | SYSTOLIC BLOOD PRESSURE: 102 MMHG | TEMPERATURE: 98.2 F | DIASTOLIC BLOOD PRESSURE: 74 MMHG | WEIGHT: 128 LBS | BODY MASS INDEX: 25.13 KG/M2 | HEART RATE: 106 BPM

## 2018-01-09 DIAGNOSIS — G44.89 OTHER HEADACHE SYNDROME: ICD-10-CM

## 2018-01-09 DIAGNOSIS — R90.82 WHITE MATTER ABNORMALITY ON MRI OF BRAIN: ICD-10-CM

## 2018-01-09 PROCEDURE — 99203 OFFICE O/P NEW LOW 30 MIN: CPT | Performed by: PSYCHIATRY & NEUROLOGY

## 2018-01-09 NOTE — ASSESSMENT & PLAN NOTE
Pt has had multiple head traumas over the years. Pt states that she had some benefit from tylenol in the past for certain headaches. Patient states she has a complete headache calendar over the type of headache in frequency of headaches but she forgot to bring to the appointment today. Patient states she doesn't think she has ever had chronic problems with migraine but may have had occasional migraine. Patient states some of her headaches are definitely tension headaches and some of the headaches come from the back of the neck.
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
## 2018-01-10 NOTE — ASSESSMENT & PLAN NOTE
MRI scan shows white matter changes most consistent with small vessel vascular disease. Patient does have a history of high cholesterol but no history of stroke or hypertension.

## 2018-01-11 ENCOUNTER — HOSPITAL ENCOUNTER (OUTPATIENT)
Facility: MEDICAL CENTER | Age: 73
End: 2018-01-11
Attending: FAMILY MEDICINE
Payer: MEDICARE

## 2018-01-11 ENCOUNTER — OFFICE VISIT (OUTPATIENT)
Dept: MEDICAL GROUP | Facility: MEDICAL CENTER | Age: 73
End: 2018-01-11
Payer: MEDICARE

## 2018-01-11 VITALS
RESPIRATION RATE: 16 BRPM | HEART RATE: 106 BPM | TEMPERATURE: 98 F | WEIGHT: 128 LBS | BODY MASS INDEX: 25 KG/M2 | OXYGEN SATURATION: 98 % | SYSTOLIC BLOOD PRESSURE: 106 MMHG | DIASTOLIC BLOOD PRESSURE: 60 MMHG

## 2018-01-11 DIAGNOSIS — N76.0 ACUTE VAGINITIS: ICD-10-CM

## 2018-01-11 PROCEDURE — 99214 OFFICE O/P EST MOD 30 MIN: CPT | Performed by: FAMILY MEDICINE

## 2018-01-11 PROCEDURE — 87660 TRICHOMONAS VAGIN DIR PROBE: CPT

## 2018-01-11 PROCEDURE — 87510 GARDNER VAG DNA DIR PROBE: CPT

## 2018-01-11 PROCEDURE — 99000 SPECIMEN HANDLING OFFICE-LAB: CPT | Performed by: FAMILY MEDICINE

## 2018-01-11 PROCEDURE — 87480 CANDIDA DNA DIR PROBE: CPT

## 2018-01-11 RX ORDER — FLUCONAZOLE 150 MG/1
150 TABLET ORAL DAILY
Qty: 1 TAB | Refills: 0 | Status: SHIPPED | OUTPATIENT
Start: 2018-01-11 | End: 2018-06-26

## 2018-01-12 DIAGNOSIS — N76.0 ACUTE VAGINITIS: ICD-10-CM

## 2018-01-12 LAB
CANDIDA DNA VAG QL PROBE+SIG AMP: NEGATIVE
G VAGINALIS DNA VAG QL PROBE+SIG AMP: NEGATIVE
T VAGINALIS DNA VAG QL PROBE+SIG AMP: NEGATIVE

## 2018-01-21 NOTE — ASSESSMENT & PLAN NOTE
Vaginal symptoms. New conditions for the last 2 weeks  Reports vaginal discharge with some pruritus.    Reports discharge is white in color and without an odor.    No abdominal, pelvic, or flank pain. No urinary symptoms.  No new partners.

## 2018-02-12 ENCOUNTER — HOSPITAL ENCOUNTER (OUTPATIENT)
Dept: LAB | Facility: MEDICAL CENTER | Age: 73
End: 2018-02-12
Attending: PHYSICIAN ASSISTANT
Payer: MEDICARE

## 2018-02-12 LAB
ALBUMIN SERPL BCP-MCNC: 4.1 G/DL (ref 3.2–4.9)
ALBUMIN/GLOB SERPL: 1.5 G/DL
ALP SERPL-CCNC: 79 U/L (ref 30–99)
ALT SERPL-CCNC: 16 U/L (ref 2–50)
ANION GAP SERPL CALC-SCNC: 7 MMOL/L (ref 0–11.9)
AST SERPL-CCNC: 13 U/L (ref 12–45)
BILIRUB SERPL-MCNC: 0.5 MG/DL (ref 0.1–1.5)
BUN SERPL-MCNC: 10 MG/DL (ref 8–22)
CALCIUM SERPL-MCNC: 9.5 MG/DL (ref 8.5–10.5)
CHLORIDE SERPL-SCNC: 106 MMOL/L (ref 96–112)
CHOLEST SERPL-MCNC: 229 MG/DL (ref 100–199)
CO2 SERPL-SCNC: 28 MMOL/L (ref 20–33)
CREAT SERPL-MCNC: 0.7 MG/DL (ref 0.5–1.4)
EST. AVERAGE GLUCOSE BLD GHB EST-MCNC: 111 MG/DL
GLOBULIN SER CALC-MCNC: 2.8 G/DL (ref 1.9–3.5)
GLUCOSE SERPL-MCNC: 92 MG/DL (ref 65–99)
HBA1C MFR BLD: 5.5 % (ref 0–5.6)
HDLC SERPL-MCNC: 106 MG/DL
LDLC SERPL CALC-MCNC: 111 MG/DL
POTASSIUM SERPL-SCNC: 4.4 MMOL/L (ref 3.6–5.5)
PROT SERPL-MCNC: 6.9 G/DL (ref 6–8.2)
SODIUM SERPL-SCNC: 141 MMOL/L (ref 135–145)
T4 FREE SERPL-MCNC: 0.87 NG/DL (ref 0.53–1.43)
TRIGL SERPL-MCNC: 61 MG/DL (ref 0–149)
TSH SERPL DL<=0.005 MIU/L-ACNC: 0.26 UIU/ML (ref 0.38–5.33)

## 2018-02-12 PROCEDURE — 80061 LIPID PANEL: CPT

## 2018-02-12 PROCEDURE — 84439 ASSAY OF FREE THYROXINE: CPT

## 2018-02-12 PROCEDURE — 80053 COMPREHEN METABOLIC PANEL: CPT

## 2018-02-12 PROCEDURE — 84443 ASSAY THYROID STIM HORMONE: CPT

## 2018-02-12 PROCEDURE — 36415 COLL VENOUS BLD VENIPUNCTURE: CPT

## 2018-02-12 PROCEDURE — 83036 HEMOGLOBIN GLYCOSYLATED A1C: CPT | Mod: GA

## 2018-02-14 ENCOUNTER — HOSPITAL ENCOUNTER (OUTPATIENT)
Dept: RADIOLOGY | Facility: MEDICAL CENTER | Age: 73
End: 2018-02-14
Attending: FAMILY MEDICINE
Payer: MEDICARE

## 2018-02-14 DIAGNOSIS — Z12.31 VISIT FOR SCREENING MAMMOGRAM: ICD-10-CM

## 2018-02-14 PROCEDURE — 77063 BREAST TOMOSYNTHESIS BI: CPT

## 2018-02-20 ENCOUNTER — OFFICE VISIT (OUTPATIENT)
Dept: NEUROLOGY | Facility: MEDICAL CENTER | Age: 73
End: 2018-02-20
Payer: MEDICARE

## 2018-02-20 VITALS
BODY MASS INDEX: 25.13 KG/M2 | OXYGEN SATURATION: 99 % | WEIGHT: 128 LBS | SYSTOLIC BLOOD PRESSURE: 128 MMHG | TEMPERATURE: 97.7 F | HEIGHT: 60 IN | HEART RATE: 86 BPM | DIASTOLIC BLOOD PRESSURE: 72 MMHG

## 2018-02-20 DIAGNOSIS — R90.82 WHITE MATTER ABNORMALITY ON MRI OF BRAIN: ICD-10-CM

## 2018-02-20 DIAGNOSIS — G44.89 OTHER HEADACHE SYNDROME: ICD-10-CM

## 2018-02-20 PROCEDURE — 99214 OFFICE O/P EST MOD 30 MIN: CPT | Performed by: PSYCHIATRY & NEUROLOGY

## 2018-02-20 NOTE — ASSESSMENT & PLAN NOTE
Pt has been concerned about the WM lesions on her MRI of the brain. Pt states that she is taking her ASA 81 mg everyday.

## 2018-02-20 NOTE — ASSESSMENT & PLAN NOTE
Pt has had only two headaches since the last visit. Pt states that she is overall doing better with her headaches.

## 2018-03-06 ENCOUNTER — OFFICE VISIT (OUTPATIENT)
Dept: MEDICAL GROUP | Facility: MEDICAL CENTER | Age: 73
End: 2018-03-06
Payer: MEDICARE

## 2018-03-06 VITALS
OXYGEN SATURATION: 98 % | WEIGHT: 127 LBS | HEIGHT: 60 IN | RESPIRATION RATE: 16 BRPM | TEMPERATURE: 97.2 F | DIASTOLIC BLOOD PRESSURE: 60 MMHG | HEART RATE: 82 BPM | SYSTOLIC BLOOD PRESSURE: 110 MMHG | BODY MASS INDEX: 24.94 KG/M2

## 2018-03-06 DIAGNOSIS — M43.10 SPONDYLOLISTHESIS, UNSPECIFIED SPINAL REGION: ICD-10-CM

## 2018-03-06 DIAGNOSIS — G89.29 CHRONIC NONINTRACTABLE HEADACHE, UNSPECIFIED HEADACHE TYPE: ICD-10-CM

## 2018-03-06 DIAGNOSIS — E06.3 LYMPHOCYTIC THYROIDITIS: ICD-10-CM

## 2018-03-06 DIAGNOSIS — J30.1 CHRONIC SEASONAL ALLERGIC RHINITIS DUE TO POLLEN: ICD-10-CM

## 2018-03-06 DIAGNOSIS — M85.80 OSTEOPENIA, UNSPECIFIED LOCATION: ICD-10-CM

## 2018-03-06 DIAGNOSIS — L50.8 CHRONIC URTICARIA: ICD-10-CM

## 2018-03-06 DIAGNOSIS — N28.1 ACQUIRED CYST OF KIDNEY: ICD-10-CM

## 2018-03-06 DIAGNOSIS — E53.8 B12 DEFICIENCY: ICD-10-CM

## 2018-03-06 DIAGNOSIS — G93.9 CHRONIC NON-SPECIFIC WHITE MATTER LESIONS ON MRI: ICD-10-CM

## 2018-03-06 DIAGNOSIS — Z00.00 MEDICARE ANNUAL WELLNESS VISIT, SUBSEQUENT: ICD-10-CM

## 2018-03-06 DIAGNOSIS — R51.9 CHRONIC NONINTRACTABLE HEADACHE, UNSPECIFIED HEADACHE TYPE: ICD-10-CM

## 2018-03-06 DIAGNOSIS — B00.9 HERPES SIMPLEX VIRUS (HSV) INFECTION: ICD-10-CM

## 2018-03-06 DIAGNOSIS — G89.29 CHRONIC LEFT-SIDED LOW BACK PAIN WITH LEFT-SIDED SCIATICA: ICD-10-CM

## 2018-03-06 DIAGNOSIS — M54.42 CHRONIC LEFT-SIDED LOW BACK PAIN WITH LEFT-SIDED SCIATICA: ICD-10-CM

## 2018-03-06 DIAGNOSIS — E78.49 OTHER HYPERLIPIDEMIA: ICD-10-CM

## 2018-03-06 PROBLEM — R90.82 WHITE MATTER ABNORMALITY ON MRI OF BRAIN: Status: RESOLVED | Noted: 2018-01-09 | Resolved: 2018-03-06

## 2018-03-06 PROBLEM — A08.4 GASTROENTERITIS AND COLITIS, VIRAL: Status: RESOLVED | Noted: 2018-01-02 | Resolved: 2018-03-06

## 2018-03-06 PROBLEM — J45.901 ALLERGIC BRONCHITIS WITH ACUTE EXACERBATION: Status: RESOLVED | Noted: 2017-05-26 | Resolved: 2018-03-06

## 2018-03-06 PROBLEM — N76.0 ACUTE VAGINITIS: Status: RESOLVED | Noted: 2018-01-11 | Resolved: 2018-03-06

## 2018-03-06 PROBLEM — G44.89 OTHER HEADACHE SYNDROME: Status: RESOLVED | Noted: 2017-10-05 | Resolved: 2018-03-06

## 2018-03-06 PROBLEM — R19.7 DIARRHEA: Status: RESOLVED | Noted: 2017-12-15 | Resolved: 2018-03-06

## 2018-03-06 PROBLEM — J02.9 PHARYNGITIS: Status: RESOLVED | Noted: 2017-05-26 | Resolved: 2018-03-06

## 2018-03-06 PROCEDURE — G0439 PPPS, SUBSEQ VISIT: HCPCS | Performed by: FAMILY MEDICINE

## 2018-03-06 ASSESSMENT — PATIENT HEALTH QUESTIONNAIRE - PHQ9: CLINICAL INTERPRETATION OF PHQ2 SCORE: 0

## 2018-03-06 ASSESSMENT — ACTIVITIES OF DAILY LIVING (ADL): BATHING_REQUIRES_ASSISTANCE: 0
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
## 2018-03-15 DIAGNOSIS — R19.7 DIARRHEA, UNSPECIFIED TYPE: ICD-10-CM

## 2018-03-22 ENCOUNTER — OFFICE VISIT (OUTPATIENT)
Dept: URGENT CARE | Facility: CLINIC | Age: 73
End: 2018-03-22
Payer: MEDICARE

## 2018-03-22 VITALS
BODY MASS INDEX: 25.13 KG/M2 | SYSTOLIC BLOOD PRESSURE: 114 MMHG | HEIGHT: 60 IN | HEART RATE: 85 BPM | RESPIRATION RATE: 16 BRPM | DIASTOLIC BLOOD PRESSURE: 80 MMHG | TEMPERATURE: 97.9 F | OXYGEN SATURATION: 99 % | WEIGHT: 128 LBS

## 2018-03-22 DIAGNOSIS — M77.8 RIGHT SHOULDER TENDINITIS: ICD-10-CM

## 2018-03-22 PROCEDURE — 99213 OFFICE O/P EST LOW 20 MIN: CPT | Performed by: NURSE PRACTITIONER

## 2018-03-22 RX ORDER — TRAMADOL HYDROCHLORIDE 50 MG/1
50 TABLET ORAL EVERY 4 HOURS PRN
COMMUNITY
End: 2018-12-12 | Stop reason: SDUPTHER

## 2018-03-22 RX ORDER — METHYLPREDNISOLONE 4 MG/1
4 TABLET ORAL DAILY
Qty: 1 KIT | Refills: 0 | Status: SHIPPED | OUTPATIENT
Start: 2018-03-22 | End: 2018-06-26

## 2018-03-22 ASSESSMENT — PATIENT HEALTH QUESTIONNAIRE - PHQ9: CLINICAL INTERPRETATION OF PHQ2 SCORE: 0

## 2018-03-22 ASSESSMENT — ENCOUNTER SYMPTOMS
FEVER: 0
JOINT SWELLING: 0

## 2018-03-23 ENCOUNTER — TELEPHONE (OUTPATIENT)
Dept: MEDICAL GROUP | Facility: MEDICAL CENTER | Age: 73
End: 2018-03-23
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
## 2018-03-24 NOTE — TELEPHONE ENCOUNTER
1. Caller Name: Joie Hart                        Call Back Number: [phone] (home)         2. Message: Patient called because she was seen in UC for arm they gave her med and told her to take 600 mg of Nsaids patient is asking due to her dx of coleitis if she should would like your recommendation       3. Patient approves office to leave a detailed voicemail/MyChart message: yes

## 2018-03-26 ENCOUNTER — HOSPITAL ENCOUNTER (OUTPATIENT)
Dept: LAB | Facility: MEDICAL CENTER | Age: 73
End: 2018-03-26
Attending: INTERNAL MEDICINE
Payer: MEDICARE

## 2018-03-26 PROCEDURE — 36415 COLL VENOUS BLD VENIPUNCTURE: CPT

## 2018-03-26 PROCEDURE — 84443 ASSAY THYROID STIM HORMONE: CPT

## 2018-03-26 PROCEDURE — 84439 ASSAY OF FREE THYROXINE: CPT

## 2018-03-27 ENCOUNTER — OFFICE VISIT (OUTPATIENT)
Dept: MEDICAL GROUP | Facility: MEDICAL CENTER | Age: 73
End: 2018-03-27
Payer: MEDICARE

## 2018-03-27 VITALS
DIASTOLIC BLOOD PRESSURE: 82 MMHG | TEMPERATURE: 97.8 F | BODY MASS INDEX: 24.74 KG/M2 | WEIGHT: 126 LBS | RESPIRATION RATE: 16 BRPM | SYSTOLIC BLOOD PRESSURE: 130 MMHG | HEIGHT: 60 IN | OXYGEN SATURATION: 96 % | HEART RATE: 101 BPM

## 2018-03-27 DIAGNOSIS — M79.601 PAIN OF RIGHT UPPER EXTREMITY: ICD-10-CM

## 2018-03-27 DIAGNOSIS — G89.4 CHRONIC PAIN SYNDROME: ICD-10-CM

## 2018-03-27 DIAGNOSIS — E06.3 LYMPHOCYTIC THYROIDITIS: ICD-10-CM

## 2018-03-27 DIAGNOSIS — R93.5 ABNORMAL CT OF THE ABDOMEN: ICD-10-CM

## 2018-03-27 LAB
T4 FREE SERPL-MCNC: 1.22 NG/DL (ref 0.53–1.43)
TSH SERPL DL<=0.005 MIU/L-ACNC: 0.3 UIU/ML (ref 0.38–5.33)

## 2018-03-27 PROCEDURE — 99214 OFFICE O/P EST MOD 30 MIN: CPT | Performed by: FAMILY MEDICINE

## 2018-03-27 NOTE — ASSESSMENT & PLAN NOTE
On 3/22/18 she developed pain in the right arm.  She has a history of tendonitis surgery on the right elbow and surgery on the right pain.  She denies any injury.  She knows it is from over use.  It seems to be worsening,

## 2018-03-30 NOTE — ASSESSMENT & PLAN NOTE
Stable. Currently taking levothyroxine 75 mcg daily as prescribed.  Denies palpitations, skin changes, temperature intolerance, or changes in bowel habits

## 2018-03-30 NOTE — PROGRESS NOTES
Subjective:     Chief Complaint   Patient presents with   • Tendonitis     Follow up UC       Joie Hart is a 72 y.o. female here today for evaluation and management of:    Pain of right upper extremity  On 3/22/18 she developed pain in the right arm.  She has a history of tendonitis surgery on the right elbow and surgery on the right pain.  She denies any injury.  She knows it is from over use.  It seems to be worsening,    Chronic pain syndrome  The patient has been on tramadol in the past     Lymphocytic thyroiditis  Stable. Currently taking levothyroxine 75 mcg daily as prescribed.  Denies palpitations, skin changes, temperature intolerance, or changes in bowel habits        Abnormal CT of the abdomen  Patient had a CT of the abdomen and emergency room visit. It showed some thickening consistent with colitis. Her C. difficile testing was negative. Patient is scheduled to see gastroenterology later this week.       Allergies   Allergen Reactions   • Codeine Rash     Rash all over           Current medicines (including changes today)  Current Outpatient Prescriptions   Medication Sig Dispense Refill   • Celecoxib (CELEBREX PO) Take  by mouth.     • tramadol (ULTRAM) 50 MG Tab Take 50 mg by mouth every four hours as needed.     • MethylPREDNISolone (MEDROL DOSEPAK) 4 MG Tablet Therapy Pack Take 1 Tab by mouth every day. 1 Kit 0   • amitriptyline (ELAVIL) 150 MG Tab TAKE 1 TABLET BY MOUTH AT BEDTIME AS NEEDED 90 Tab 3   • Magnesium 100 MG Cap Take  by mouth.     • Oral Electrolytes (EMERGEN-C ELECTRO MIX PO) Take  by mouth.     • fluconazole (DIFLUCAN) 150 MG tablet Take 1 Tab by mouth every day. 1 Tab 0   • ACYCLOVIR PO Take  by mouth.     • acetaminophen (TYLENOL) 500 MG Tab Take 500-1,000 mg by mouth 2 Times a Day.     • Omega-3 Fatty Acids (FISH OIL) 1000 MG Cap capsule Take 1,000 mg by mouth 3 times a day, with meals.     • cyanocobalamin (VITAMIN B-12) 500 MCG Tab Take 500 mcg by mouth every day.      • coenzyme Q-10 30 MG capsule Take 60 mg by mouth every day.     • VITAMIN A PO Take  by mouth.     • therapeutic multivitamin-minerals (THERAGRAN-M) Tab Take 1 Tab by mouth every day.     • Probiotic Product (PROBIOTIC DAILY PO) Take  by mouth.     • levothyroxine (SYNTHROID) 75 MCG Tab Take 75 mcg by mouth Every morning on an empty stomach.       No current facility-administered medications for this visit.        She  has a past medical history of Allergy; Anemia; Anxiety; Cataract; Chronic back pain; Depression (2009); Diverticulitis; Glaucoma; Head injury, closed (3/6/16); HSV infection; IBD (inflammatory bowel disease); Migraine; Pelvic fracture (CMS-[facility]) (1976); Spondylisthesis; Thyroid disease; and Vaginal vault prolapse (2006).    Patient Active Problem List    Diagnosis Date Noted   • Pain of right upper extremity 03/27/2018   • Chronic pain syndrome 03/27/2018   • Abnormal CT of the abdomen 03/27/2018   • Chronic headaches 10/05/2017   • Chronic non-specific white matter lesions on MRI 10/05/2017   • Chronic left-sided low back pain with sciatica 07/25/2017   • Seasonal allergic rhinitis due to pollen 05/26/2017   • B12 deficiency 12/20/2016   • Spondylisthesis    • Acquired cyst of kidney 03/23/2016   • Lymphocytic thyroiditis 03/23/2016   • Herpes simplex virus (HSV) infection 11/07/2012   • Other hyperlipidemia 11/07/2012   • Chronic urticaria 11/07/2012   • Osteopenia 11/06/2012       ROS   No fever or chills.  No nausea or vomiting.  No chest pain or palpitations.  No cough or SOB.  No pain with urination or hematuria.  No black or bloody stools.       Objective:     Blood pressure 130/82, pulse (!) 101, temperature 36.6 °C (97.8 °F), resp. rate 16, height 1.524 m (5'), weight 57.2 kg (126 lb), SpO2 96 %. Body mass index is 24.61 kg/m².   Physical Exam:  Well developed, well nourished.  Alert, oriented in no acute distress.  Eye contact is good, speech goal directed, affect calm  Eyes: conjunctiva  non-injected, sclera non-icteric.  Neck Supple.  No adenopathy or masses in the neck or supraclavicular regions. No thyromegaly  Lungs: clear to auscultation bilaterally with good excursion. No wheezes or rhonchi  CV: regular rate and rhythm. No murmur  Abdomen: soft, nontender, no masses or organomegaly.  No rebound or guarding  Wrist/Hand: No deformity, swelling or bruising noted. Tenderness to palpation radially. No tenderness at snuffbox. Range of motion intact. Strength and sensation intact.  Good  strength. 2+ radial pulse. Finkelstein's test: Negative.   Neck exam: No spinal tenderness to palpation. Normal flexion, extension and lateral rotation ROM. Spurling's test negative.   Shoulder/arm exam: No deformity, erythema, edema or ecchymosis. Tenderness to palpation biceps insertion. ROM intact decreased in elevation. Hawkin's test negative. Neer's test negative.  Empty can test negative. Drop arm test negative.  5/5 strength bilaterally.   Elbow/forearm: Tenderness to palpation: Lateral condyle. Full ROM .  5/5 strength throughout bilaterally. 2+ DTR biceps and triceps bilaterally.               Assessment and Plan:   The following treatment plan was discussed    1. Pain of right upper extremity  Referred to physical therapy. Ice to area. Refer to orthopedics if not improving  - REFERRAL TO PHYSICAL THERAPY Reason for Therapy: Eval/Treat/Report    2. Chronic pain syndrome  Stable.    3. Abnormal CT of the abdomen  Follow up with gastroenterology as scheduled    4. Lymphocytic thyroiditis  Continue current Synthroid dose.    Any change or worsening of signs or symptoms, patient encouraged to follow-up or report to the emergency room for further evaluation. Patient understands and agrees.    Followup: Return in about 3 months (around 6/27/2018).

## 2018-03-30 NOTE — ASSESSMENT & PLAN NOTE
Patient had a CT of the abdomen and emergency room visit. It showed some thickening consistent with colitis. Her C. difficile testing was negative. Patient is scheduled to see gastroenterology later this week.

## 2018-03-30 NOTE — ASSESSMENT & PLAN NOTE
[facility]  [address]  [place] [address] Joie Hart  MRN: [id number], : 1945, Sex: F  Adm: 2017, D/C: 2017   Order   CT-ABDOMEN-PELVIS WITH [[id number]] (Order [id number])   Reviewed by Evelio Wood D.O. on 2017 10:08 PM   Images     Show images for CT-ABDOMEN-PELVIS WITH   View SmartLink Info     CT-ABDOMEN-PELVIS WITH (Order #[id number]) on 17   Narrative       2017 3:33 PM    HISTORY/REASON FOR EXAM:  Abdominal pain and change in bowel habits, evaluate for diverticulitis Pain.      TECHNIQUE/EXAM DESCRIPTION:  CT scan of the abdomen and pelvis with contrast.    Contrast-enhanced helical scanning was obtained from the diaphragmatic domes through the pubic symphysis following the bolus administration of nonionic contrast without complication.    100 mL of Omnipaque 350 nonionic contrast was administered without complication.    Low dose optimization technique was utilized for this CT exam including automated exposure control and adjustment of the mA and/or kV according to patient size.    COMPARISON: Abdominal ultrasound 2007    FINDINGS:  Lung bases: The lung bases are clear.    Osseous structures:  There is spinal and bilateral sacroiliac joint osteoarthritis.    Abdomen:    There is diffuse colonic wall thickening although especially involving the transverse colon.    Most likely diagnosis is infectious colitis including the possibility of pseudomembranous colitis.    Also within the differential diagnosis is inflammatory bowel disease or much less likely ischemic colitis.    There is no abscess. There is no free or extraluminal air.    The liver is normal in appearance.    There is no biliary dilation.    The spleen is normal in appearance.    The pancreas is normal in appearance.    The splenic vein and portal vein enhance normally.    Both adrenal glands are normal in appearance.    The right kidney is normal in  appearance.    The left kidney has focal areas of volume loss consistent with scarring.    There is no hydronephrosis.    Bowel and mesentery: Within normal limits.    The aorta shows atherosclerosis without aneurysm.    The terminal ileum is normal in appearance.    The appendix is normal in appearance.    Pelvis:    Within normal limits.   Impression       1.  Diffuse colonic wall thickening although especially involving the transverse colon    2.  Most likely diagnosis is infectious colitis including the possibility of pseudomembranous colitis    3.  Less likely etiology or inflammatory bowel disease or ischemic colitis    4.  No other significant finding   Reading Provider Reading Date   Naveen Parada M.D. Dec 30, 2017   Signing Provider Signing Date Signing Time   Naveen Parada M.D. Dec 30, 2017  5:06 PM   Lab Information     Lab   RADIOLOGY              Last Resulted Time   Sat Dec 30, 2017  5:08 PM   Detailed Information     Priority and Order Details           Collection Information

## 2018-04-10 ENCOUNTER — TELEPHONE (OUTPATIENT)
Dept: MEDICAL GROUP | Facility: MEDICAL CENTER | Age: 73
End: 2018-04-10

## 2018-04-10 NOTE — TELEPHONE ENCOUNTER
Phone Number Called: [phone] (home)     Message: LVM to call back.     Left Message for patient to call back: yes

## 2018-04-10 NOTE — TELEPHONE ENCOUNTER
VOICEMAIL  1. Caller Name: Joie                     Call Back Number: [phone] (home)     2. Message: Patient requesting a call back.  States she has a UTI and has appt with UC therefore needs a call before appt @ 5PM.     3. Patient approves office to leave a detailed voicemail/MyChart message: yes

## 2018-04-11 ENCOUNTER — OFFICE VISIT (OUTPATIENT)
Dept: MEDICAL GROUP | Facility: MEDICAL CENTER | Age: 73
End: 2018-04-11
Payer: MEDICARE

## 2018-04-11 VITALS
SYSTOLIC BLOOD PRESSURE: 112 MMHG | TEMPERATURE: 97.6 F | DIASTOLIC BLOOD PRESSURE: 70 MMHG | BODY MASS INDEX: 24.39 KG/M2 | HEIGHT: 60 IN | OXYGEN SATURATION: 99 % | RESPIRATION RATE: 16 BRPM | WEIGHT: 124.2 LBS | HEART RATE: 94 BPM

## 2018-04-11 DIAGNOSIS — R31.9 HEMATURIA, UNSPECIFIED TYPE: ICD-10-CM

## 2018-04-11 LAB
APPEARANCE UR: CLEAR
BILIRUB UR STRIP-MCNC: NEGATIVE MG/DL
COLOR UR AUTO: YELLOW
GLUCOSE UR STRIP.AUTO-MCNC: NEGATIVE MG/DL
KETONES UR STRIP.AUTO-MCNC: NEGATIVE MG/DL
LEUKOCYTE ESTERASE UR QL STRIP.AUTO: NEGATIVE
NITRITE UR QL STRIP.AUTO: NEGATIVE
PH UR STRIP.AUTO: 6.5 [PH] (ref 5–8)
PROT UR QL STRIP: NEGATIVE MG/DL
RBC UR QL AUTO: NORMAL
SP GR UR STRIP.AUTO: 1
UROBILINOGEN UR STRIP-MCNC: 0.2 MG/DL

## 2018-04-11 PROCEDURE — 99213 OFFICE O/P EST LOW 20 MIN: CPT | Performed by: FAMILY MEDICINE

## 2018-04-11 PROCEDURE — 81002 URINALYSIS NONAUTO W/O SCOPE: CPT | Performed by: FAMILY MEDICINE

## 2018-04-11 ASSESSMENT — PAIN SCALES - GENERAL: PAINLEVEL: NO PAIN

## 2018-04-11 NOTE — TELEPHONE ENCOUNTER
I spoke with patient and she explained she cancelled UC appointment and has scheduled to see  today @ 11 AM.

## 2018-04-11 NOTE — ASSESSMENT & PLAN NOTE
Patient had an episode of blood in her urine.  Photo she brought looks like dark urine and 20 minutes later it was clear (also has a photo)  She had just had an episode of diarrhea with several stools.  No blood on the tissue.  No recurrence since.

## 2018-04-13 ENCOUNTER — PHYSICAL THERAPY (OUTPATIENT)
Dept: PHYSICAL THERAPY | Facility: MEDICAL CENTER | Age: 73
End: 2018-04-13
Attending: FAMILY MEDICINE
Payer: MEDICARE

## 2018-04-13 DIAGNOSIS — M79.601 PAIN OF RIGHT UPPER EXTREMITY: ICD-10-CM

## 2018-04-13 PROCEDURE — G8991 OTHER PT/OT GOAL STATUS: HCPCS | Mod: CI

## 2018-04-13 PROCEDURE — 97162 PT EVAL MOD COMPLEX 30 MIN: CPT

## 2018-04-13 PROCEDURE — G8990 OTHER PT/OT CURRENT STATUS: HCPCS | Mod: CJ

## 2018-04-13 PROCEDURE — 97014 ELECTRIC STIMULATION THERAPY: CPT

## 2018-04-13 ASSESSMENT — ENCOUNTER SYMPTOMS
PAIN SCALE: 8
PAIN SCALE AT HIGHEST: 8
PAIN SCALE AT LOWEST: 5
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
## 2018-04-14 NOTE — PROGRESS NOTES
Subjective:     Chief Complaint   Patient presents with   • Diarrhea     2 days    • Blood in Urine     4/10/18       Joie Hart is a 72 y.o. female here today for evaluation and management of:    Hematuria  Patient had an episode of blood in her urine.  Photo she brought looks like dark urine and 20 minutes later it was clear (also has a photo)  She had just had an episode of diarrhea with several stools.  No blood on the tissue.  No recurrence since.       Allergies   Allergen Reactions   • Codeine Rash     Rash all over           Current medicines (including changes today)  Current Outpatient Prescriptions   Medication Sig Dispense Refill   • tramadol (ULTRAM) 50 MG Tab Take 50 mg by mouth every four hours as needed.     • amitriptyline (ELAVIL) 150 MG Tab TAKE 1 TABLET BY MOUTH AT BEDTIME AS NEEDED 90 Tab 3   • Magnesium 100 MG Cap Take  by mouth.     • Oral Electrolytes (EMERGEN-C ELECTRO MIX PO) Take  by mouth.     • ACYCLOVIR PO Take  by mouth.     • Omega-3 Fatty Acids (FISH OIL) 1000 MG Cap capsule Take 1,000 mg by mouth 3 times a day, with meals.     • cyanocobalamin (VITAMIN B-12) 500 MCG Tab Take 500 mcg by mouth every day.     • coenzyme Q-10 30 MG capsule Take 60 mg by mouth every day.     • therapeutic multivitamin-minerals (THERAGRAN-M) Tab Take 1 Tab by mouth every day.     • Probiotic Product (PROBIOTIC DAILY PO) Take  by mouth.     • levothyroxine (SYNTHROID) 75 MCG Tab Take 75 mcg by mouth Every morning on an empty stomach.     • Celecoxib (CELEBREX PO) Take  by mouth.     • MethylPREDNISolone (MEDROL DOSEPAK) 4 MG Tablet Therapy Pack Take 1 Tab by mouth every day. 1 Kit 0   • fluconazole (DIFLUCAN) 150 MG tablet Take 1 Tab by mouth every day. 1 Tab 0   • acetaminophen (TYLENOL) 500 MG Tab Take 500-1,000 mg by mouth 2 Times a Day.     • VITAMIN A PO Take  by mouth.       No current facility-administered medications for this visit.        She  has a past medical history of Allergy;  Anemia; Anxiety; Cataract; Chronic back pain; Depression (2009); Diverticulitis; Glaucoma; Head injury, closed (3/6/16); HSV infection; IBD (inflammatory bowel disease); Migraine; Pelvic fracture (CMS-[facility]) (1976); Spondylisthesis; Thyroid disease; and Vaginal vault prolapse (2006).    Patient Active Problem List    Diagnosis Date Noted   • Hematuria 04/11/2018   • Pain of right upper extremity 03/27/2018   • Chronic pain syndrome 03/27/2018   • Abnormal CT of the abdomen 03/27/2018   • Chronic headaches 10/05/2017   • Chronic non-specific white matter lesions on MRI 10/05/2017   • Chronic left-sided low back pain with sciatica 07/25/2017   • Seasonal allergic rhinitis due to pollen 05/26/2017   • B12 deficiency 12/20/2016   • Spondylisthesis    • Acquired cyst of kidney 03/23/2016   • Lymphocytic thyroiditis 03/23/2016   • Herpes simplex virus (HSV) infection 11/07/2012   • Other hyperlipidemia 11/07/2012   • Chronic urticaria 11/07/2012   • Osteopenia 11/06/2012       ROS   No fever or chills.  No nausea or vomiting.  No chest pain or palpitations.  No cough or SOB.  No pain with urination,   No black or bloody stools.       Objective:     Blood pressure 112/70, pulse 94, temperature 36.4 °C (97.6 °F), resp. rate 16, height 1.524 m (5'), weight 56.3 kg (124 lb 3.2 oz), SpO2 99 %, not currently breastfeeding. Body mass index is 24.26 kg/m².   Physical Exam:  Well developed, well nourished.  Alert, oriented in no acute distress.  Eye contact is good, speech goal directed, affect calm  Eyes: conjunctiva non-injected, sclera non-icteric..  Neck Supple.  No adenopathy or masses in the neck or supraclavicular regions. No thyromegaly  Lungs: clear to auscultation bilaterally with good excursion. No wheezes or rhonchi  CV: regular rate and rhythm. No murmur  Abdomen: soft, nontender, no masses or organomegaly.  No rebound or guarding  Ext: no edema, color normal, vascularity normal, temperature normal          Assessment  and Plan:   The following treatment plan was discussed    1. Hematuria, unspecified type  No recurrence.  Patient will call if it recurrs  - POCT Urinalysis    Any change or worsening of signs or symptoms, patient encouraged to follow-up or report to the emergency room for further evaluation. Patient understands and agrees.    Followup: Return if symptoms worsen or fail to improve.

## 2018-04-17 ENCOUNTER — OFFICE VISIT (OUTPATIENT)
Dept: MEDICAL GROUP | Facility: MEDICAL CENTER | Age: 73
End: 2018-04-17
Payer: MEDICARE

## 2018-04-17 ENCOUNTER — PHYSICAL THERAPY (OUTPATIENT)
Dept: PHYSICAL THERAPY | Facility: MEDICAL CENTER | Age: 73
End: 2018-04-17
Attending: FAMILY MEDICINE
Payer: MEDICARE

## 2018-04-17 VITALS
SYSTOLIC BLOOD PRESSURE: 124 MMHG | OXYGEN SATURATION: 99 % | TEMPERATURE: 97 F | BODY MASS INDEX: 24.54 KG/M2 | WEIGHT: 125 LBS | HEIGHT: 60 IN | RESPIRATION RATE: 16 BRPM | DIASTOLIC BLOOD PRESSURE: 74 MMHG | HEART RATE: 94 BPM

## 2018-04-17 DIAGNOSIS — K59.09 OTHER CONSTIPATION: ICD-10-CM

## 2018-04-17 DIAGNOSIS — M79.601 PAIN OF RIGHT UPPER EXTREMITY: ICD-10-CM

## 2018-04-17 PROCEDURE — 97110 THERAPEUTIC EXERCISES: CPT

## 2018-04-17 PROCEDURE — 97014 ELECTRIC STIMULATION THERAPY: CPT

## 2018-04-17 PROCEDURE — 99213 OFFICE O/P EST LOW 20 MIN: CPT | Performed by: FAMILY MEDICINE

## 2018-04-17 PROCEDURE — 97140 MANUAL THERAPY 1/> REGIONS: CPT

## 2018-04-17 ASSESSMENT — PAIN SCALES - GENERAL: PAINLEVEL: NO PAIN

## 2018-04-17 NOTE — PATIENT INSTRUCTIONS
Polyethylene Glycol powder  What is this medicine?  POLYETHYLENE GLYCOL 3350 (venancio ee ETH i sabino; GLYE col) powder is a laxative used to treat constipation. It increases the amount of water in the stool. Bowel movements become easier and more frequent.  This medicine may be used for other purposes; ask your health care provider or pharmacist if you have questions.  COMMON BRAND NAME(S): GaviLax, GIALAX, GlycoLax, MiraLax, Smooth LAX, [employer]  What should I tell my health care provider before I take this medicine?  They need to know if you have any of these conditions:  -a history of blockage of the stomach or intestine  -current abdomen distension or pain  -difficulty swallowing  -diverticulitis, ulcerative colitis, or other chronic bowel disease  -phenylketonuria  -an unusual or allergic reaction to polyethylene glycol, other medicines, dyes, or preservatives  -pregnant or trying to get pregnant  -breast-feeding  How should I use this medicine?  Take this medicine by mouth. The bottle has a measuring cap that is marked with a line. Pour the powder into the cap up to the marked line (the dose is about 1 heaping tablespoon). Add the powder in the cap to a full glass (4 to 8 ounces or 120 to 240 ml) of water, juice, soda, coffee or tea. Mix the powder well. Drink the solution. Take exactly as directed. Do not take your medicine more often than directed.  Talk to your pediatrician regarding the use of this medicine in children. Special care may be needed.  Overdosage: If you think you have taken too much of this medicine contact a poison control center or emergency room at once.  NOTE: This medicine is only for you. Do not share this medicine with others.  What if I miss a dose?  If you miss a dose, take it as soon as you can. If it is almost time for your next dose, take only that dose. Do not take double or extra doses.  What may interact with this medicine?  Interactions are not expected.  This list may not  describe all possible interactions. Give your health care provider a list of all the medicines, herbs, non-prescription drugs, or dietary supplements you use. Also tell them if you smoke, drink alcohol, or use illegal drugs. Some items may interact with your medicine.  What should I watch for while using this medicine?  Do not use for more than 2 weeks without advice from your doctor or health care professional. It can take 2 to 4 days to have a bowel movement and to experience improvement in constipation. See your health care professional for any changes in bowel habits, including constipation, that are severe or last longer than three weeks.  Always take this medicine with plenty of water.  What side effects may I notice from receiving this medicine?  Side effects that you should report to your doctor or health care professional as soon as possible:  -diarrhea  -difficulty breathing  -itching of the skin, hives, or skin rash  -severe bloating, pain, or distension of the stomach  -vomiting  Side effects that usually do not require medical attention (report to your doctor or health care professional if they continue or are bothersome):  -bloating or gas  -lower abdominal discomfort or cramps  -nausea  This list may not describe all possible side effects. Call your doctor for medical advice about side effects. You may report side effects to FDA at 6-900-FDA-3255.  Where should I keep my medicine?  Keep out of the reach of children.  Store between 15 and 30 degrees C (59 and 86 degrees F). Throw away any unused medicine after the expiration date.  NOTE: This sheet is a summary. It may not cover all possible information. If you have questions about this medicine, talk to your doctor, pharmacist, or health care provider.  © 2018 [employer]/Gold Standard (2009-07-20 16:50:45)

## 2018-04-17 NOTE — OP THERAPY DAILY TREATMENT
Outpatient Physical Therapy  DAILY TREATMENT     [facility] Outpatient Physical Therapy  [address]  [place] [address]  Phone:  [phone]  Fax:  [phone]    Date: 04/17/2018    Patient: Joie Hart  YOB: 1945  MRN: [id number]     Time Calculation  Start time: 1035  Stop time: 1120 Time Calculation (min): 45 minutes     Chief Complaint: Arm Pain (Right UE)    Visit #: 2    SUBJECTIVE:  Pt continues to report increased pain in her right UE with activities such as working on the computer and writing.     OBJECTIVE:  Current objective measures:    strength: next session          Therapeutic Exercises (CPT 11709):     1. UBE, 1'/1' fwd/bkwd    2. Wall angels, x10, HEP    3. Flashers with chin tuck, *Reproduced right UE pain    4. Rows, L2 TB, HEP    5. Median nerve glide, x10, HEP    6. Ulnar nerve glide, x10, HEP      Therapeutic Exercise Summary: Pt given H/O with pictures/descriptions of HEP    Therapeutic Treatments and Modalities:     1. Manual Therapy (CPT 67077), IASTM right>left UT/cervical musculature, manual stretch to bilateral UT/LAD to right UE, 1st rib caudal glides    2. E Stim Unattended (CPT 60224), IFC +MHP to right cervical spine    Time-based treatments/modalities:  Manual therapy minutes (CPT 72864): 10 minutes  Therapeutic exercise minutes (CPT 70504): 20 minutes       Pain rating before treatment: 4  Pain rating after treatment: 2    ASSESSMENT:   Response to treatment: Pt demos fair understanding of all exercises for HEP; continues to have poor postural awareness and TTP/tenderness at right elbow/wrist and medial bicep. Pt will benefit from continuing skilled PT services to improve right UE functional strength.    PLAN/RECOMMENDATIONS:   Plan for treatment: therapy treatment to continue next visit.  Planned interventions for next visit: continue with current treatment, E-stim unattended (CPT 37796), hot/cold pack therapy (CPT  23369), manual therapy (CPT 52060), neuromuscular re-education (CPT 50722), therapeutic activities (CPT 19729) and therapeutic exercise (CPT 93580).

## 2018-04-19 ENCOUNTER — PHYSICAL THERAPY (OUTPATIENT)
Dept: PHYSICAL THERAPY | Facility: MEDICAL CENTER | Age: 73
End: 2018-04-19
Attending: FAMILY MEDICINE
Payer: MEDICARE

## 2018-04-19 DIAGNOSIS — M79.601 PAIN OF RIGHT UPPER EXTREMITY: ICD-10-CM

## 2018-04-19 PROCEDURE — 97110 THERAPEUTIC EXERCISES: CPT

## 2018-04-19 PROCEDURE — 97014 ELECTRIC STIMULATION THERAPY: CPT

## 2018-04-19 PROCEDURE — 97140 MANUAL THERAPY 1/> REGIONS: CPT

## 2018-04-19 NOTE — OP THERAPY DAILY TREATMENT
Outpatient Physical Therapy  DAILY TREATMENT     [facility] Outpatient Physical Therapy  [address]  [place] [address]  Phone:  [phone]  Fax:  [phone]    Date: 04/19/2018    Patient: Joie Hart  YOB: 1945  MRN: [id number]     Time Calculation  Start time: 1100  Stop time: 1145 Time Calculation (min): 45 minutes     Chief Complaint: Arm Pain    Visit #: 3    SUBJECTIVE:  Pt reports no major change in symptoms at this time.     OBJECTIVE:  Current objective measures:    strength: R UE=26# and L UE=36#  Moderate tightness on right>left cervical musculature        Therapeutic Exercises (CPT 46712):     1. UBE, 1:30/1:30 fwd/bkwd    2. Foam roller on wall, Snow angels, scapular retraction, ALT UE flexion, x10 each    3. Median nerve glide, x10    4. Ulnar nerve glide, x10      Therapeutic Exercise Summary: Review of HEP    Therapeutic Treatments and Modalities:     1. Manual Therapy (CPT 80867), IASTM right>left UT/cervical musculature, manual stretch to bilateral UT/LAD to right UE, 1st rib caudal glides    2. E Stim Unattended (CPT 07537), IFC +MHP to right cervical spine    Time-based treatments/modalities:  Manual therapy minutes (CPT 46053): 10 minutes  Therapeutic exercise minutes (CPT 97041): 20 minutes       Pain rating before treatment: 4  Pain rating after treatment: 2    ASSESSMENT:   Response to treatment: Pt continues to have decreased postural awareness and is limited in HEP secondary to memory deficits. Pt requires increased instruction/reminders to properly perform exercises independently. Pt will benefit from continuing skilled PT services to restore functional use of right UE.     PLAN/RECOMMENDATIONS:   Plan for treatment: therapy treatment to continue next visit.  Planned interventions for next visit: continue with current treatment, E-stim unattended (CPT 59822), hot/cold pack therapy (CPT 36168), manual therapy (CPT 63488),  neuromuscular re-education (CPT 14292), therapeutic activities (CPT 73137) and therapeutic exercise (CPT 85150).

## 2018-04-20 NOTE — PROGRESS NOTES
Subjective:     Chief Complaint   Patient presents with   • Patient Question     questions about colonoscopy, miralax, liver lesion       Joie Hart is a 72 y.o. female here today for evaluation and management of:    Other constipation  Patient canceled her colonoscopy because she was not feeling well. She is now rescheduled for 6 weeks or so. She was having diarrhea but now is having constipation. She has a bowel movement every other day but it is hard. She just now started using the MiraLAX. Overall she is feeling better.       Allergies   Allergen Reactions   • Codeine Rash     Rash all over           Current medicines (including changes today)  Current Outpatient Prescriptions   Medication Sig Dispense Refill   • tramadol (ULTRAM) 50 MG Tab Take 50 mg by mouth every four hours as needed.     • amitriptyline (ELAVIL) 150 MG Tab TAKE 1 TABLET BY MOUTH AT BEDTIME AS NEEDED 90 Tab 3   • Magnesium 100 MG Cap Take  by mouth.     • Oral Electrolytes (EMERGEN-C ELECTRO MIX PO) Take  by mouth.     • acetaminophen (TYLENOL) 500 MG Tab Take 500-1,000 mg by mouth 2 Times a Day.     • Omega-3 Fatty Acids (FISH OIL) 1000 MG Cap capsule Take 1,000 mg by mouth 3 times a day, with meals.     • cyanocobalamin (VITAMIN B-12) 500 MCG Tab Take 500 mcg by mouth every day.     • coenzyme Q-10 30 MG capsule Take 60 mg by mouth every day.     • therapeutic multivitamin-minerals (THERAGRAN-M) Tab Take 1 Tab by mouth every day.     • Probiotic Product (PROBIOTIC DAILY PO) Take  by mouth.     • levothyroxine (SYNTHROID) 75 MCG Tab Take 75 mcg by mouth Every morning on an empty stomach.     • Celecoxib (CELEBREX PO) Take  by mouth.     • MethylPREDNISolone (MEDROL DOSEPAK) 4 MG Tablet Therapy Pack Take 1 Tab by mouth every day. 1 Kit 0   • fluconazole (DIFLUCAN) 150 MG tablet Take 1 Tab by mouth every day. (Patient not taking: Reported on 4/17/2018) 1 Tab 0   • ACYCLOVIR PO Take  by mouth.     • VITAMIN A PO Take  by mouth.        No current facility-administered medications for this visit.        She  has a past medical history of Allergy; Anemia; Anxiety; Cataract; Chronic back pain; Depression (2009); Diverticulitis; Glaucoma; Head injury, closed (3/6/16); HSV infection; IBD (inflammatory bowel disease); Migraine; Pelvic fracture (CMS-[facility]) (1976); Spondylisthesis; Thyroid disease; and Vaginal vault prolapse (2006).    Patient Active Problem List    Diagnosis Date Noted   • Other constipation 04/17/2018   • Hematuria 04/11/2018   • Pain of right upper extremity 03/27/2018   • Chronic pain syndrome 03/27/2018   • Abnormal CT of the abdomen 03/27/2018   • Chronic headaches 10/05/2017   • Chronic non-specific white matter lesions on MRI 10/05/2017   • Chronic left-sided low back pain with sciatica 07/25/2017   • Seasonal allergic rhinitis due to pollen 05/26/2017   • B12 deficiency 12/20/2016   • Spondylisthesis    • Acquired cyst of kidney 03/23/2016   • Lymphocytic thyroiditis 03/23/2016   • Herpes simplex virus (HSV) infection 11/07/2012   • Other hyperlipidemia 11/07/2012   • Chronic urticaria 11/07/2012   • Osteopenia 11/06/2012       ROS   No fever or chills.  No nausea or vomiting.  No chest pain or palpitations.  No cough or SOB.  No pain with urination or hematuria.  No black or bloody stools.       Objective:     Blood pressure 124/74, pulse 94, temperature 36.1 °C (97 °F), resp. rate 16, height 1.524 m (5'), weight 56.7 kg (125 lb), SpO2 99 %. Body mass index is 24.41 kg/m².   Physical Exam:  Well developed, well nourished.  Alert, oriented in no acute distress.  Eye contact is good, speech goal directed, affect calm  Eyes: conjunctiva non-injected, sclera non-icteric.  Ears: Pinna normal. TM pearly gray.   Nose: Nares are patent.  Normal mucosa  Mouth: Oral mucous membranes pink and moist with no lesions.  Neck Supple.  No adenopathy or masses in the neck or supraclavicular regions. No thyromegaly  Lungs: clear to auscultation  bilaterally with good excursion. No wheezes or rhonchi  CV: regular rate and rhythm. No murmur  Abdomen: soft, nontender, no masses or organomegaly.  No rebound or guarding        Assessment and Plan:   The following treatment plan was discussed    1. Other constipation  Increase fiber in the diet. MiraLAX daily as needed. Increase fluids. Follow up with gastroenterology as scheduled   ANy change or worsening of signs or symptoms, patient encouraged to follow-up or report to the emergency room for further evaluation. Patient understands and agrees.    Followup: Return if symptoms worsen or fail to improve.

## 2018-04-20 NOTE — ASSESSMENT & PLAN NOTE
Patient canceled her colonoscopy because she was not feeling well. She is now rescheduled for 6 weeks or so. She was having diarrhea but now is having constipation. She has a bowel movement every other day but it is hard. She just now started using the MiraLAX. Overall she is feeling better.

## 2018-04-24 ENCOUNTER — PHYSICAL THERAPY (OUTPATIENT)
Dept: PHYSICAL THERAPY | Facility: MEDICAL CENTER | Age: 73
End: 2018-04-24
Attending: FAMILY MEDICINE
Payer: MEDICARE

## 2018-04-24 DIAGNOSIS — M79.601 PAIN OF RIGHT UPPER EXTREMITY: ICD-10-CM

## 2018-04-24 PROCEDURE — 97014 ELECTRIC STIMULATION THERAPY: CPT

## 2018-04-24 PROCEDURE — 97140 MANUAL THERAPY 1/> REGIONS: CPT

## 2018-04-24 PROCEDURE — 97110 THERAPEUTIC EXERCISES: CPT

## 2018-04-24 NOTE — OP THERAPY DAILY TREATMENT
Outpatient Physical Therapy  DAILY TREATMENT     [facility] Outpatient Physical Therapy  [address]  [place] [address]  Phone:  [phone]  Fax:  [phone]    Date: 04/24/2018    Patient: Joie Hatr  YOB: 1945  MRN: [id number]     Time Calculation  Start time: 1100  Stop time: 1145 Time Calculation (min): 45 minutes     Chief Complaint: Arm Pain    Visit #: 4    SUBJECTIVE:  Pt reports severe pain last night in her right arm, but then it was much improved this morning.    OBJECTIVE:  Current objective measures:   TTP throughout right UE    Therapeutic Exercises (CPT 34317):     1. UBE, 1:30/1:30 fwd/bkwd    2. Foam Roller, ALT UE flexion, Bilat UE flexion, serratus punches, snow angels      Therapeutic Exercise Summary: Pt given H/O with pictures and descriptions of HEP    Therapeutic Treatments and Modalities:     1. Manual Therapy (CPT 58674), IASTM right>left UT/cervical musculature, manual stretch to bilateral UT/LAD to right UE, 1st rib caudal glides; IASTM to right lateral epicondyle wrist extensors    2. E Stim Unattended (CPT 15235), IFC +MHP to right cervical spine and right elbow, x15 minutes    Time-based treatments/modalities:  Manual therapy minutes (CPT 82038): 15 minutes  Therapeutic exercise minutes (CPT 25696): 15 minutes       Pain rating before treatment: 4  Pain rating after treatment: 2    ASSESSMENT:   Response to treatment: Pt tolerated the addition of the foam roller well today; will benefit from incorporating foam roller into her HEP. Pt continues to have decreased postural awareness with stiffness in T/S and will benefit from skilled PT to improve these limitations and restore functional use of her right UE.     PLAN/RECOMMENDATIONS:   Plan for treatment: therapy treatment to continue next visit.  Planned interventions for next visit: continue with current treatment, E-stim unattended (CPT 92352), hot/cold pack therapy (CPT  27361), manual therapy (CPT 41924), neuromuscular re-education (CPT 70859), therapeutic activities (CPT 06023) and therapeutic exercise (CPT 92138).

## 2018-04-26 ENCOUNTER — APPOINTMENT (OUTPATIENT)
Dept: PHYSICAL THERAPY | Facility: MEDICAL CENTER | Age: 73
End: 2018-04-26
Attending: FAMILY MEDICINE
Payer: MEDICARE

## 2018-05-01 ENCOUNTER — PHYSICAL THERAPY (OUTPATIENT)
Dept: PHYSICAL THERAPY | Facility: MEDICAL CENTER | Age: 73
End: 2018-05-01
Attending: FAMILY MEDICINE
Payer: MEDICARE

## 2018-05-01 DIAGNOSIS — M79.601 PAIN OF RIGHT UPPER EXTREMITY: ICD-10-CM

## 2018-05-01 PROCEDURE — 97140 MANUAL THERAPY 1/> REGIONS: CPT

## 2018-05-01 PROCEDURE — 97110 THERAPEUTIC EXERCISES: CPT

## 2018-05-01 PROCEDURE — 97014 ELECTRIC STIMULATION THERAPY: CPT

## 2018-05-01 NOTE — OP THERAPY DAILY TREATMENT
"  Outpatient Physical Therapy  DAILY TREATMENT     [facility] Outpatient Physical Therapy  [address]  [place] [address]  Phone:  [phone]  Fax:  [phone]    Date: 05/01/2018    Patient: Joie Hart  YOB: 1945  MRN: [id number]     Time Calculation  Start time: 1500  Stop time: 1545 Time Calculation (min): 45 minutes     Chief Complaint: Arm Pain    Visit #: 5    SUBJECTIVE:  Pt reports feeling a minor setback last week following her colonoscopy.     OBJECTIVE:  Current objective measures:   Increased tone/tension throughout right cervical musculature    Therapeutic Exercises (CPT 59276):     1. Chin tucks, 10x10\"    2. Foam Roller, ALT UE flexion, Bilat UE flexion, serratus punches, snow angels      Therapeutic Exercise Summary: Pt given H/O with pictures and descriptions of HEP    Therapeutic Treatments and Modalities:     1. Manual Therapy (CPT 90047), IASTM right>left UT/cervical musculature, manual stretch to bilateral UT/LAD to right UE, 1st rib caudal glides; IASTM to right lateral epicondyle wrist extensors    2. E Stim Unattended (CPT 01430), IFC +MHP to right cervical spine and right elbow, x15 minutes    Time-based treatments/modalities:  Manual therapy minutes (CPT 35767): 12 minutes  Therapeutic exercise minutes (CPT 27400): 18 minutes       Pain rating before treatment: 4  Pain rating after treatment: 2    ASSESSMENT:   Response to treatment: Pt continues to require moderate cueing to remember and properly perform exercises. Pt symptoms are significantly improved following each session, but pt will need increased guidance/reinforcement to allow carry-over to independence at home. Continue to recommend skilled PT to improve functional use of right UE and address postural deficits.    PLAN/RECOMMENDATIONS:   Plan for treatment: therapy treatment to continue next visit.  Planned interventions for next visit: continue with current treatment, " E-stim unattended (CPT 91657), hot/cold pack therapy (CPT 76230), manual therapy (CPT 06887), neuromuscular re-education (CPT 73887), therapeutic activities (CPT 27880) and therapeutic exercise (CPT 02009).

## 2018-05-03 ENCOUNTER — PHYSICAL THERAPY (OUTPATIENT)
Dept: PHYSICAL THERAPY | Facility: MEDICAL CENTER | Age: 73
End: 2018-05-03
Attending: FAMILY MEDICINE
Payer: MEDICARE

## 2018-05-03 DIAGNOSIS — M79.601 PAIN OF RIGHT UPPER EXTREMITY: ICD-10-CM

## 2018-05-03 PROCEDURE — 97110 THERAPEUTIC EXERCISES: CPT

## 2018-05-03 PROCEDURE — 97014 ELECTRIC STIMULATION THERAPY: CPT

## 2018-05-03 PROCEDURE — 97140 MANUAL THERAPY 1/> REGIONS: CPT

## 2018-05-03 NOTE — OP THERAPY DAILY TREATMENT
"  Outpatient Physical Therapy  DAILY TREATMENT     [facility] Outpatient Physical Therapy  [address]  [place] [address]  Phone:  [phone]  Fax:  [phone]    Date: 05/03/2018    Patient: Joie Hart  YOB: 1945  MRN: [id number]     Time Calculation  Start time: 1400  Stop time: 1415 Time Calculation (min): 15 minutes     Chief Complaint: Arm Pain    Visit #: 6    SUBJECTIVE:  Pt reports getting a full body massage yesterday and since her session on Tuesday is feeling \"pretty good.\"    OBJECTIVE:  Current objective measures:   Increased tone/tension throughout right cervical musculature    Therapeutic Exercises (CPT 11927):     1. Chin tucks, 10x10\"    2. Foam Roller, ALT UE flexion, Bilat UE flexion, serratus punches, snow angels    3. Chin tuck , with balloon, with TB-->All too challenging and pt was advised to continue chin tuck in supine at home    Therapeutic Treatments and Modalities:     1. Manual Therapy (CPT 85354), IASTM right>left UT/cervical musculature, manual stretch to bilateral UT/LAD to right UE, 1st rib caudal glides; IASTM to right lateral epicondyle wrist extensors    2. E Stim Unattended (CPT 16145), IFC +MHP to right cervical spine and right elbow, x15 minutes    Time-based treatments/modalities:  Manual therapy minutes (CPT 84228): 15 minutes  Therapeutic exercise minutes (CPT 79843): 15 minutes       Pain rating before treatment: 2  Pain rating after treatment: 1    ASSESSMENT:   Response to treatment: Pt unable to isolate cervical retraction motion with tendency to move trunk with head. Pt continues to have significant tightness throughout all cervical musculature on the right>left. These contribute to right UE pain which limits her ability to participate in all typical ADL/IADL. Pt will benefit from continuing skilled PT to improve functional use of her right arm.    PLAN/RECOMMENDATIONS:   Plan for treatment: therapy treatment " to continue next visit.  Planned interventions for next visit: continue with current treatment, E-stim unattended (CPT 90899), hot/cold pack therapy (CPT 58796), manual therapy (CPT 10925), neuromuscular re-education (CPT 55872), therapeutic activities (CPT 44888) and therapeutic exercise (CPT 81378); continue education on chin tuck and exercises/manual treatment for neural decompression.

## 2018-05-08 ENCOUNTER — PHYSICAL THERAPY (OUTPATIENT)
Dept: PHYSICAL THERAPY | Facility: MEDICAL CENTER | Age: 73
End: 2018-05-08
Attending: FAMILY MEDICINE
Payer: MEDICARE

## 2018-05-08 DIAGNOSIS — M79.601 PAIN OF RIGHT UPPER EXTREMITY: ICD-10-CM

## 2018-05-08 PROCEDURE — 97140 MANUAL THERAPY 1/> REGIONS: CPT

## 2018-05-08 PROCEDURE — 97110 THERAPEUTIC EXERCISES: CPT

## 2018-05-08 PROCEDURE — 97014 ELECTRIC STIMULATION THERAPY: CPT

## 2018-05-08 NOTE — OP THERAPY DAILY TREATMENT
"  Outpatient Physical Therapy  DAILY TREATMENT     [facility] Outpatient Physical Therapy  [address]  [place] [address]  Phone:  [phone]  Fax:  [phone]    Date: 05/08/2018    Patient: Joie Hart  YOB: 1945  MRN: [id number]     Time Calculation  Start time: 1430  Stop time: 1515 Time Calculation (min): 45 minutes     Chief Complaint: Neck Pain    Visit #: 7    SUBJECTIVE:  Pt reports that she has had mild pain in her right elbow and now is more concerned about her neck pain at this point.     OBJECTIVE:  Current objective measures:   Increased tone/tension throughout right cervical musculature    Therapeutic Exercises (CPT 36406):     1. UBE, 1:30/1:30 fwd and bkwd    2. Foam Roller, ALT UE flexion, Bilat UE flexion, serratus punches, snow angels    3. Foam Roller, bilateral ER with chin tuck, 5x10\"    Therapeutic Treatments and Modalities:     1. Manual Therapy (CPT 58943), IASTM right>left UT/cervical musculature, manual stretch to bilateral UT/LAD to right UE, 1st rib caudal glides; IASTM to right lateral epicondyle wrist extensors    2. E Stim Unattended (CPT 16752), IFC +MHP to right cervical spine and right elbow, x15 minutes    Time-based treatments/modalities:  Manual therapy minutes (CPT 24804): 15 minutes  Therapeutic exercise minutes (CPT 95668): 15 minutes       Pain rating before treatment: 2  Pain rating after treatment: 1    ASSESSMENT:   Response to treatment: Pt continues to have difficulty performing chin tuck and maintaining posture during exercises secondary to tightness throughout T/S and cervical musculature with tightness on the right>left. Pt will benefit from continuing skilled PT services to promote full return to PLOF for right UE functional use.     PLAN/RECOMMENDATIONS:   Plan for treatment: therapy treatment to continue next visit.  Planned interventions for next visit: continue with current treatment, E-stim unattended " (CPT 09436), hot/cold pack therapy (CPT 80331), manual therapy (CPT 70775), neuromuscular re-education (CPT 46604), therapeutic activities (CPT 20759) and therapeutic exercise (CPT 60318); continue education on chin tuck and exercises/manual treatment for neural decompression.

## 2018-05-10 ENCOUNTER — PHYSICAL THERAPY (OUTPATIENT)
Dept: PHYSICAL THERAPY | Facility: MEDICAL CENTER | Age: 73
End: 2018-05-10
Attending: FAMILY MEDICINE
Payer: MEDICARE

## 2018-05-10 DIAGNOSIS — M79.601 PAIN OF RIGHT UPPER EXTREMITY: ICD-10-CM

## 2018-05-10 PROCEDURE — 97140 MANUAL THERAPY 1/> REGIONS: CPT

## 2018-05-10 PROCEDURE — 97110 THERAPEUTIC EXERCISES: CPT

## 2018-05-10 PROCEDURE — 97014 ELECTRIC STIMULATION THERAPY: CPT

## 2018-05-10 NOTE — OP THERAPY DAILY TREATMENT
"  Outpatient Physical Therapy  DAILY TREATMENT     [facility] Outpatient Physical Therapy  [address]  [place] [address]  Phone:  [phone]  Fax:  [phone]    Date: 05/10/2018    Patient: Joie Hart  YOB: 1945  MRN: [id number]     Time Calculation  Start time: 1330  Stop time: 1415 Time Calculation (min): 45 minutes     Chief Complaint: Arm Pain    Visit #: 8    SUBJECTIVE:  Pt continues to report improvements overall, but still with pain in her right shoulder and occasionally the elbow.    OBJECTIVE:  Current objective measures:   Increased tone/tension throughout right cervical musculature    Therapeutic Exercises (CPT 97807):     1. UBE, 1:30/1:30 fwd and bkwd    2. Foam Roller, ALT UE flexion, Bilat UE flexion, serratus punches, snow angels    3. Foam Roller, bilateral ER with chin tuck, 5x10\"    Therapeutic Treatments and Modalities:     1. Manual Therapy (CPT 08438), IASTM right>left UT/cervical musculature, manual stretch to bilateral UT/LAD to right UE, 1st rib caudal glides; IASTM to right lateral epicondyle wrist extensors    2. E Stim Unattended (CPT 16704), IFC +MHP to right cervical spine and right elbow, x15 minutes    Time-based treatments/modalities:  Manual therapy minutes (CPT 70710): 10 minutes  Therapeutic exercise minutes (CPT 18661): 20 minutes       Pain rating before treatment: 2  Pain rating after treatment: 1    ASSESSMENT:   Response to treatment: Pt demos significant TTP at her right supraspinatus insertion; requires moderate cueing to perform full ROM on all exercises with the foam roller and to maintain scapular positioning. Pt will benefit from continuing skilled PT services to improve functional use of bilateral UEs.    PLAN/RECOMMENDATIONS:   Plan for treatment: therapy treatment to continue next visit.  Planned interventions for next visit: continue with current treatment, E-stim unattended (CPT 71716), hot/cold pack " therapy (CPT 25964), manual therapy (CPT 25356), neuromuscular re-education (CPT 12030), therapeutic activities (CPT 05479) and therapeutic exercise (CPT 88280); continue education on posture and exercises/manual treatment for neural decompression.

## 2018-05-14 ENCOUNTER — HOSPITAL ENCOUNTER (OUTPATIENT)
Dept: LAB | Facility: MEDICAL CENTER | Age: 73
End: 2018-05-14
Attending: INTERNAL MEDICINE
Payer: MEDICARE

## 2018-05-14 PROCEDURE — 84439 ASSAY OF FREE THYROXINE: CPT

## 2018-05-14 PROCEDURE — 84443 ASSAY THYROID STIM HORMONE: CPT

## 2018-05-14 PROCEDURE — 36415 COLL VENOUS BLD VENIPUNCTURE: CPT

## 2018-05-15 LAB
T4 FREE SERPL-MCNC: 1.34 NG/DL (ref 0.53–1.43)
TSH SERPL DL<=0.005 MIU/L-ACNC: 0.04 UIU/ML (ref 0.38–5.33)

## 2018-05-17 ENCOUNTER — PHYSICAL THERAPY (OUTPATIENT)
Dept: PHYSICAL THERAPY | Facility: MEDICAL CENTER | Age: 73
End: 2018-05-17
Attending: FAMILY MEDICINE
Payer: MEDICARE

## 2018-05-17 DIAGNOSIS — M79.601 PAIN OF RIGHT UPPER EXTREMITY: ICD-10-CM

## 2018-05-17 PROCEDURE — 97014 ELECTRIC STIMULATION THERAPY: CPT

## 2018-05-17 PROCEDURE — 97110 THERAPEUTIC EXERCISES: CPT

## 2018-05-17 PROCEDURE — 97140 MANUAL THERAPY 1/> REGIONS: CPT

## 2018-05-17 NOTE — OP THERAPY DAILY TREATMENT
"  Outpatient Physical Therapy  DAILY TREATMENT     [facility] Outpatient Physical Therapy  [address]  [place] [address]  Phone:  [phone]  Fax:  [phone]    Date: 05/17/2018    Patient: Joie Hart  YOB: 1945  MRN: [id number]     Time Calculation  Start time: 1330  Stop time: 1415 Time Calculation (min): 45 minutes     Chief Complaint: Arm Pain    Visit #: 9    SUBJECTIVE:  Pt reports less pain overall, but still tightness across bilateral shoulders.     OBJECTIVE:  Current objective measures:   Increased tone/tension throughout right cervical musculature    Therapeutic Exercises (CPT 86414):     1. UBE, 1:30/1:30 fwd and bkwd    2. Foam Roller, ALT UE flexion, Bilat UE flexion, serratus punches, snow angels, T/S ROLL OUT    3. Foam Roller, bilateral ER with chin tuck, 5x10\"    Therapeutic Treatments and Modalities:     1. Manual Therapy (CPT 72792), IASTM right>left UT/cervical musculature, manual stretch to bilateral UT/LAD to right UE, 1st rib caudal glides; IASTM to right lateral epicondyle wrist extensors    2. E Stim Unattended (CPT 41335), IFC +MHP to right cervical spine while on 1/2 foam roller and performing pec stretch, x15 minutes    Time-based treatments/modalities:  Manual therapy minutes (CPT 98800): 15 minutes  Therapeutic exercise minutes (CPT 03011): 15 minutes       Pain rating before treatment: 2  Pain rating after treatment: 1    ASSESSMENT:   Response to treatment: Pt continues to have significant tightness/TTP throughout UT/levator scap musculature. Pt will benefit from further manual treatment to address this and increased participation in postural exercises/thoracic spine mobility to improve functional use of bilateral UE and reduce overall pain.     PLAN/RECOMMENDATIONS:   Plan for treatment: therapy treatment to continue next visit.  Planned interventions for next visit: continue with current treatment, E-stim unattended (CPT " 94902), hot/cold pack therapy (CPT 07481), manual therapy (CPT 87739), neuromuscular re-education (CPT 65844), therapeutic activities (CPT 24477) and therapeutic exercise (CPT 41331); continue education on posture and exercises/manual treatment for neural decompression.

## 2018-05-24 ENCOUNTER — PHYSICAL THERAPY (OUTPATIENT)
Dept: PHYSICAL THERAPY | Facility: MEDICAL CENTER | Age: 73
End: 2018-05-24
Attending: FAMILY MEDICINE
Payer: MEDICARE

## 2018-05-24 DIAGNOSIS — M79.601 PAIN OF RIGHT UPPER EXTREMITY: ICD-10-CM

## 2018-05-24 PROCEDURE — G8991 OTHER PT/OT GOAL STATUS: HCPCS | Mod: CI

## 2018-05-24 PROCEDURE — 97014 ELECTRIC STIMULATION THERAPY: CPT

## 2018-05-24 PROCEDURE — G8990 OTHER PT/OT CURRENT STATUS: HCPCS | Mod: CJ

## 2018-05-24 PROCEDURE — 97140 MANUAL THERAPY 1/> REGIONS: CPT

## 2018-05-24 PROCEDURE — 97110 THERAPEUTIC EXERCISES: CPT

## 2018-05-24 NOTE — OP THERAPY PROGRESS SUMMARY
Outpatient Physical Therapy  PROGRESS SUMMARY NOTE      [facility] Outpatient Physical Therapy  [address]  [place] [address]  Phone:  [phone]  Fax:  [phone]    Date of Visit: 05/24/2018    Patient: Joie Hart  YOB: 1945  MRN: [id number]     Referring Provider: Monae Beck M.D.  [address]  [address]  [place] [address]   Referring Diagnosis Pain in right arm [M79.601]     Visit Diagnoses     ICD-10-CM   1. Pain of right upper extremity M79.601       Rehab Potential: good    Physical Therapy Occurrence Codes    Date of onset of impairment:  11/24/17   Date physical therapy care plan established or reviewed:  4/13/18   Date physical therapy treatment started:  4/13/18          Cert Period: 05/24/18 to 7/19/18  Progress Report Period: 04/13/18 to 05/24/18    Functional Limitation G-Codes and Severity Modifiers  PT Functional Assessment Tool Used: NDI  PT Functional Assessment Score: 25  Quickdash General Total Score: 56.82   Current status: Other PT / OT Primary: Current (): CJ   Goal status: Other PT/OT Primary: Goal (): CI         Objective Findings and Assessment:   Patient progression towards goals: Please see daily note for today and updated goals below.    Objective findings and assessment details: See daily note for today    Goals:   Short Term Goals:   Pt will be independent/compliant with all HEP. Goal met*  Pt will report <2/10 pain with all ADL/IADL. 75% met*  Pt will have no pain with MMT to right UE. 75% met*  Short term goal time span:  2-4 weeks      Long Term Goals:    Pt will report no pain in right UE with all ADL/IADL. Goal not met*  Pt will be able to lift >5# in right UE with pain <2/10. Goal partially met*  Pt will score <10% disability on NDI or QuickDash. Goal partially met*    Long term goal time span:  6-8 weeks    Plan:   Planned therapy interventions:  E Stim Unattended (CPT 59923), Hot or Cold Pack  Therapy (CPT 39172), Manual Therapy (CPT 96558), Neuromuscular Re-education (CPT 18142) and Therapeutic Exercise (CPT 79236)  Plan details:  1x/wk for 8 weeks to further address all limitations and enable increased participation in all ADL/IADL.        Referring provider co-signature:  I have reviewed this plan of care and my co-signature certifies the need for services.    Physician Signature: ________________________________ Date: ______________

## 2018-05-24 NOTE — OP THERAPY DAILY TREATMENT
"  Outpatient Physical Therapy  DAILY TREATMENT     [facility] Outpatient Physical Therapy  [address]  [place] [address]  Phone:  [phone]  Fax:  [phone]    Date: 05/24/2018    Patient: Joie Hart  YOB: 1945  MRN: [id number]     Time Calculation  Start time: 1300  Stop time: 1345 Time Calculation (min): 45 minutes     Chief Complaint: Arm Pain (Right elbow, left upper arm)    Visit #: 10    SUBJECTIVE:  Pt reports intermittent symptoms of pain in her left arm today and mild pain in the right elbow only. Pt reports that overall her symptoms have significantly improved with the exception of when she is doing more strenuous activities around the house (especially gardening).     OBJECTIVE:  Current objective measures:   Increased tone/tension throughout right cervical musculature  (-)supraspinatus/empty can  (+)Neer's/holt mary grace bilaterally  (-)ULNT    Therapeutic Exercises (CPT 22974):     1. UBE, 1:30/1:30 fwd and bkwd    2. Foam Roller, ALT UE flexion, Bilat UE flexion, serratus punches, snow angels, T/S ROLL OUT    3. Foam Roller, bilateral ER with chin tuck, 5x10\"    Therapeutic Treatments and Modalities:     1. Manual Therapy (CPT 58128), IASTM right>left UT/cervical musculature, manual stretch to bilateral UT/LAD to right UE, 1st rib caudal glides; IASTM to right lateral epicondyle wrist extensors    2. E Stim Unattended (CPT 07881), IFC +MHP to right cervical spine while on 1/2 foam roller and performing pec stretch, x15 minutes    Time-based treatments/modalities:  Manual therapy minutes (CPT 98579): 20 minutes  Therapeutic exercise minutes (CPT 18205): 10 minutes       Pain rating before treatment: 2  Pain rating after treatment: 1    ASSESSMENT:   Response to treatment: Pt does have positive testing for bilateral shoulder impingement, but the nature and quality of her typical pain fluctuates and changes too frequently to fit this " diagnosis. Pt has symptoms that are very likely referral from her neck and are strongly related to her posture. Pt will benefit from continuing skilled PT services to improve postural awareness and reduce severity of all bilateral UE pain to enable increased participation in all typical ADL/IADL.     PLAN/RECOMMENDATIONS:   Plan for treatment: therapy treatment to continue next visit.  Planned interventions for next visit: continue with current treatment, E-stim unattended (CPT 54814), hot/cold pack therapy (CPT 17245), manual therapy (CPT 33444), neuromuscular re-education (CPT 45871), therapeutic activities (CPT 81627) and therapeutic exercise (CPT 08971); continue education on posture and exercises/manual treatment for neural decompression.

## 2018-05-29 ENCOUNTER — APPOINTMENT (OUTPATIENT)
Dept: PHYSICAL THERAPY | Facility: MEDICAL CENTER | Age: 73
End: 2018-05-29
Attending: FAMILY MEDICINE
Payer: MEDICARE

## 2018-05-31 ENCOUNTER — PHYSICAL THERAPY (OUTPATIENT)
Dept: PHYSICAL THERAPY | Facility: MEDICAL CENTER | Age: 73
End: 2018-05-31
Attending: FAMILY MEDICINE
Payer: MEDICARE

## 2018-05-31 DIAGNOSIS — M79.601 PAIN OF RIGHT UPPER EXTREMITY: ICD-10-CM

## 2018-05-31 PROCEDURE — 97140 MANUAL THERAPY 1/> REGIONS: CPT

## 2018-05-31 PROCEDURE — 97014 ELECTRIC STIMULATION THERAPY: CPT

## 2018-05-31 PROCEDURE — 97110 THERAPEUTIC EXERCISES: CPT

## 2018-05-31 NOTE — OP THERAPY DAILY TREATMENT
"  Outpatient Physical Therapy  DAILY TREATMENT     [facility] Outpatient Physical Therapy  [address]  [place] [address]  Phone:  [phone]  Fax:  [phone]    Date: 05/31/2018    Patient: Joie Hart  YOB: 1945  MRN: [id number]     Time Calculation  Start time: 1300  Stop time: 1345 Time Calculation (min): 45 minutes     Chief Complaint: Shoulder Pain (right shoulder stiffness)    Visit #: 11    SUBJECTIVE:  Pt states that her right arm is a little more stiff today.      OBJECTIVE:  Current objective measures:   Increased tone/tension throughout right cervical musculature  (-)supraspinatus/empty can  (+)Neer's/holt mary grace bilaterally  (-)ULNT    Therapeutic Exercises (CPT 84684):     1. UBE, 1:30/1:30 fwd and bkwd    2. Foam Roller, ALT UE flexion, Bilat UE flexion, serratus punches, snow angels, T/S ROLL OUT    3. Foam Roller, bilateral ER with chin tuck, 5x10\"      Therapeutic Exercise Summary: Access Code: [email or web address]   URL: [email or web address]   Date: 05/31/2018   Prepared by: Julita Briones      Exercises  0 Seated Levator Scapulae Stretch - 10 reps - 10-30\" hold - 1x daily - 7x weekly  0 Seated Upper Trapezius Stretch - 10 reps - 1x daily - 7x weekly  0 Seated Passive Cervical Retraction - 10 reps - 1x daily - 7x weekly  0 Standing Pec Stretch at Wall - 10 reps - 1x daily - 7x weekly        Therapeutic Treatments and Modalities:     1. Manual Therapy (CPT 62052), IASTM right>left UT/cervical musculature, manual stretch to bilateral UT/LAD to right UE, 1st rib caudal glides, manual cervical traction x3 minutes    2. E Stim Unattended (CPT 78427), IFC +MHP to right cervical spine while on 1/2 foam roller and performing pec stretch, x15 minutes    Time-based treatments/modalities:  Manual therapy minutes (CPT 77452): 15 minutes  Therapeutic exercise minutes (CPT 58415): 15 minutes       Pain rating before treatment: 2  Pain rating after " treatment: 1    ASSESSMENT:   Response to treatment: Pt continues to demonstrate symptoms consistent with cervical spine referral pain. Pt will benefit from further edu/training on posture and head positioning to reduce symptoms in her right UE which will enable increased participation in all ADL/IADL.     PLAN/RECOMMENDATIONS:   Plan for treatment: therapy treatment to continue next visit.  Planned interventions for next visit: continue with current treatment, E-stim unattended (CPT 08485), hot/cold pack therapy (CPT 78516), manual therapy (CPT 09860), neuromuscular re-education (CPT 74194), therapeutic activities (CPT 64180) and therapeutic exercise (CPT 54996); continue education on posture and exercises/manual treatment for neural decompression.

## 2018-06-01 ENCOUNTER — OFFICE VISIT (OUTPATIENT)
Dept: INTERNAL MEDICINE | Facility: MEDICAL CENTER | Age: 73
End: 2018-06-01
Payer: MEDICARE

## 2018-06-01 VITALS
DIASTOLIC BLOOD PRESSURE: 66 MMHG | SYSTOLIC BLOOD PRESSURE: 108 MMHG | BODY MASS INDEX: 24.54 KG/M2 | HEIGHT: 60 IN | WEIGHT: 125 LBS | OXYGEN SATURATION: 97 % | TEMPERATURE: 98.7 F | HEART RATE: 91 BPM

## 2018-06-01 DIAGNOSIS — K52.9 CHRONIC DIARRHEA: ICD-10-CM

## 2018-06-01 PROCEDURE — 99213 OFFICE O/P EST LOW 20 MIN: CPT | Mod: GE | Performed by: INTERNAL MEDICINE

## 2018-06-01 RX ORDER — HYDROXYZINE HYDROCHLORIDE 25 MG/1
25 TABLET, FILM COATED ORAL
COMMUNITY
Start: 2016-04-01 | End: 2018-09-17 | Stop reason: SDUPTHER

## 2018-06-01 RX ORDER — LEVOTHYROXINE SODIUM 88 MCG
TABLET ORAL
Refills: 0 | COMMUNITY
Start: 2018-03-29 | End: 2018-06-14

## 2018-06-01 ASSESSMENT — PAIN SCALES - GENERAL: PAINLEVEL: NO PAIN

## 2018-06-01 NOTE — PATIENT INSTRUCTIONS
Food Choices to Help Relieve Diarrhea, Adult  When you have diarrhea, the foods you eat and your eating habits are very important. Choosing the right foods and drinks can help relieve diarrhea. Also, because diarrhea can last up to 7 days, you need to replace lost fluids and electrolytes (such as sodium, potassium, and chloride) in order to help prevent dehydration.   WHAT GENERAL GUIDELINES DO I NEED TO FOLLOW?  · Slowly drink 1 cup (8 oz) of fluid for each episode of diarrhea. If you are getting enough fluid, your urine will be clear or pale yellow.  · Eat starchy foods. Some good choices include white rice, white toast, pasta, low-fiber cereal, baked potatoes (without the skin), saltine crackers, and bagels.  · Avoid large servings of any cooked vegetables.  · Limit fruit to two servings per day. A serving is ½ cup or 1 small piece.  · Choose foods with less than 2 g of fiber per serving.  · Limit fats to less than 8 tsp (38 g) per day.  · Avoid fried foods.  · Eat foods that have probiotics in them. Probiotics can be found in certain dairy products.  · Avoid foods and beverages that may increase the speed at which food moves through the stomach and intestines (gastrointestinal tract). Things to avoid include:  ¨ High-fiber foods, such as dried fruit, raw fruits and vegetables, nuts, seeds, and whole grain foods.  ¨ Spicy foods and high-fat foods.  ¨ Foods and beverages sweetened with high-fructose corn syrup, honey, or sugar alcohols such as xylitol, sorbitol, and mannitol.  WHAT FOODS ARE RECOMMENDED?  Grains  White rice. White, Gibraltarian, or abhi breads (fresh or toasted), including plain rolls, buns, or bagels. White pasta. Saltine, soda, or marshall crackers. Pretzels. Low-fiber cereal. Cooked cereals made with water (such as cornmeal, farina, or cream cereals). Plain muffins. Matzo. Pao toast. Zwieback.   Vegetables  Potatoes (without the skin). Strained tomato and vegetable juices. Most well-cooked and canned  vegetables without seeds. Tender lettuce.  Fruits  Cooked or canned applesauce, apricots, cherries, fruit cocktail, grapefruit, peaches, pears, or plums. Fresh bananas, apples without skin, cherries, grapes, cantaloupe, grapefruit, peaches, oranges, or plums.   Meat and Other Protein Products  Baked or boiled chicken. Eggs. Tofu. Fish. Seafood. Smooth peanut butter. Ground or well-cooked tender beef, ham, veal, lamb, pork, or poultry.   Dairy  Plain yogurt, kefir, and unsweetened liquid yogurt. Lactose-free milk, buttermilk, or soy milk. Plain hard cheese.  Beverages  Sport drinks. Clear broths. Diluted fruit juices (except prune). Regular, caffeine-free sodas such as ginger ale. Water. Decaffeinated teas. Oral rehydration solutions. Sugar-free beverages not sweetened with sugar alcohols.  Other  Bouillon, broth, or soups made from recommended foods.   The items listed above may not be a complete list of recommended foods or beverages. Contact your dietitian for more options.  WHAT FOODS ARE NOT RECOMMENDED?  Grains  Whole grain, whole wheat, bran, or rye breads, rolls, pastas, crackers, and cereals. Wild or brown rice. Cereals that contain more than 2 g of fiber per serving. Corn tortillas or taco shells. Cooked or dry oatmeal. Granola. Popcorn.  Vegetables  Raw vegetables. Cabbage, broccoli, Ewen sprouts, artichokes, baked beans, beet greens, corn, kale, legumes, peas, sweet potatoes, and yams. Potato skins. Cooked spinach and cabbage.  Fruits  Dried fruit, including raisins and dates. Raw fruits. Stewed or dried prunes. Fresh apples with skin, apricots, mangoes, pears, raspberries, and strawberries.   Meat and Other Protein Products  Butler peanut butter. Nuts and seeds. Beans and lentils. Zamora.   Dairy  High-fat cheeses. Milk, chocolate milk, and beverages made with milk, such as milk shakes. Cream. Ice cream.  Sweets and Desserts  Sweet rolls, doughnuts, and sweet breads. Pancakes and waffles.  Fats and  Oils  Butter. Cream sauces. Margarine. Salad oils. Plain salad dressings. Olives. Avocados.   Beverages  Caffeinated beverages (such as coffee, tea, soda, or energy drinks). Alcoholic beverages. Fruit juices with pulp. Prune juice. Soft drinks sweetened with high-fructose corn syrup or sugar alcohols.  Other  Coconut. Hot sauce. Chili powder. Mayonnaise. Gravy. Cream-based or milk-based soups.   The items listed above may not be a complete list of foods and beverages to avoid. Contact your dietitian for more information.  WHAT SHOULD I DO IF I BECOME DEHYDRATED?  Diarrhea can sometimes lead to dehydration. Signs of dehydration include dark urine and dry mouth and skin. If you think you are dehydrated, you should rehydrate with an oral rehydration solution. These solutions can be purchased at pharmacies, retail stores, or online.   Drink ½-1 cup (120-240 mL) of oral rehydration solution each time you have an episode of diarrhea. If drinking this amount makes your diarrhea worse, try drinking smaller amounts more often. For example, drink 1-3 tsp (5-15 mL) every 5-10 minutes.   A general rule for staying hydrated is to drink 1½-2 L of fluid per day. Talk to your health care provider about the specific amount you should be drinking each day. Drink enough fluids to keep your urine clear or pale yellow.     This information is not intended to replace advice given to you by your health care provider. Make sure you discuss any questions you have with your health care provider.     Document Released: 03/09/2005 Document Revised: 01/08/2016 Document Reviewed: 11/10/2014  [email or web address] Interactive Patient Education ©2016 [email or web address]

## 2018-06-01 NOTE — PROGRESS NOTES
Established Patient    Joie presents today with the following:    CC: chronic diarrhea     HPI: pt is a 73 yo CF presented to the clinic for the acute visit for chronic diarrhea. Pt is having on ans off diarrhea since 6-7 months. It alternates with constipation. She is pt of Dr Beck , who is evaluating her for diarrhea ans constipation. She was referred to GI for the diarrhea in March, she saw GI in April, had colonoscopy which showed diverticulosis and hemorrhoids. At that time she was having constipation and recommended to take miralax.  Pt has stopped taking miralax 3 weeks back when diarrhea re- occurred. In past she is been w/u for infectious cause which was negative, her thyroid function was negative.   currenlty she is having watery diarrhea, 2-3 times a day. No associated fever, chills, N/V, bloating, abdominal pain, urgency. She takes loperamide which is not helping much, and she dont want to take it for long term.  She has tried BRAT diet w/o much relief, taking almond milk, wit some cheese. Didn't try to exclude gluten from diet     Patient Active Problem List    Diagnosis Date Noted   • Other constipation 04/17/2018   • Hematuria 04/11/2018   • Pain of right upper extremity 03/27/2018   • Chronic pain syndrome 03/27/2018   • Abnormal CT of the abdomen 03/27/2018   • Chronic headaches 10/05/2017   • Chronic non-specific white matter lesions on MRI 10/05/2017   • Chronic left-sided low back pain with sciatica 07/25/2017   • Seasonal allergic rhinitis due to pollen 05/26/2017   • B12 deficiency 12/20/2016   • Spondylisthesis    • Acquired cyst of kidney 03/23/2016   • Lymphocytic thyroiditis 03/23/2016   • Herpes simplex virus (HSV) infection 11/07/2012   • Other hyperlipidemia 11/07/2012   • Chronic urticaria 11/07/2012   • Osteopenia 11/06/2012       Current Outpatient Prescriptions   Medication Sig Dispense Refill   • hydrOXYzine HCl (ATARAX) 25 MG Tab Take 25 mg by mouth.     •  amitriptyline (ELAVIL) 150 MG Tab TAKE 1 TABLET BY MOUTH AT BEDTIME AS NEEDED 90 Tab 3   • Magnesium 100 MG Cap Take  by mouth.     • Oral Electrolytes (EMERGEN-C ELECTRO MIX PO) Take  by mouth.     • Omega-3 Fatty Acids (FISH OIL) 1000 MG Cap capsule Take 1,000 mg by mouth 3 times a day, with meals.     • coenzyme Q-10 30 MG capsule Take 60 mg by mouth every day.     • therapeutic multivitamin-minerals (THERAGRAN-M) Tab Take 1 Tab by mouth every day.     • Probiotic Product (PROBIOTIC DAILY PO) Take  by mouth.     • levothyroxine (SYNTHROID) 75 MCG Tab Take 75 mcg by mouth Every morning on an empty stomach.     • SYNTHROID 88 MCG Tab TK 1 T PO 5 DAYS AND 1 AND 1/2 TS 2 DAYS Q WEEK  0   • Celecoxib (CELEBREX PO) Take  by mouth.     • tramadol (ULTRAM) 50 MG Tab Take 50 mg by mouth every four hours as needed.     • MethylPREDNISolone (MEDROL DOSEPAK) 4 MG Tablet Therapy Pack Take 1 Tab by mouth every day. (Patient not taking: Reported on 6/1/2018) 1 Kit 0   • fluconazole (DIFLUCAN) 150 MG tablet Take 1 Tab by mouth every day. 1 Tab 0   • ACYCLOVIR PO Take  by mouth.     • acetaminophen (TYLENOL) 500 MG Tab Take 500-1,000 mg by mouth 2 Times a Day.     • cyanocobalamin (VITAMIN B-12) 500 MCG Tab Take 500 mcg by mouth every day.     • VITAMIN A PO Take  by mouth.       No current facility-administered medications for this visit.        ROS: As per HPI. Additional pertinent symptoms as noted below.        /66   Pulse 91   Temp 37.1 °C (98.7 °F)   Ht 1.524 m (5')   Wt 56.7 kg (125 lb)   SpO2 97%   Breastfeeding? No   BMI 24.41 kg/m²     Physical Exam   Constitutional:  oriented to person, place, and time. No distress.   Eyes: Pupils are equal, round, and reactive to light. No scleral icterus.  Neck: Neck supple. No thyromegaly present.   Cardiovascular: Normal rate, regular rhythm and normal heart sounds.  Exam reveals no gallop and no friction rub.  No murmur heard.  Pulmonary/Chest: Breath sounds normal.  Chest wall is not dull to percussion.   Abdomen: soft non tender, BS present  Lymphadenopathy: no cervical adenopathy  Neurological: alert and oriented to person, place, and time.   Skin: No cyanosis. Nails show no clubbing.      Note: I have reviewed all pertinent labs and diagnostic tests associated with this visit with specific comments listed under the assessment and plan below    Assessment and Plan    1. Chronic diarrhea  ddx malabsorption, lactose intolerance, IBS, microscopic colitis, food induced functional, carcinoid, MALT  Less likely infectious, IBD   - BRAT diet, eliminate lactate and gluten completely, mantain a food diary   - continue imodium or can try kaoptate, pepto bismol, take probiotic, take yogurt  - keep fluids up, take ORS if > 3 watery diarrhea in a day   - f/u with [facility] consultants for chronic diarrhea   - if any worsening of the symptoms, blood in stool please call to office or report to nearest ED     Followup: Return if symptoms worsen or fail to improve.      Signed by: Daja Viveros M.D.

## 2018-06-07 ENCOUNTER — OFFICE VISIT (OUTPATIENT)
Dept: MEDICAL GROUP | Facility: MEDICAL CENTER | Age: 73
End: 2018-06-07
Payer: MEDICARE

## 2018-06-07 VITALS
HEIGHT: 60 IN | BODY MASS INDEX: 22.97 KG/M2 | DIASTOLIC BLOOD PRESSURE: 70 MMHG | HEART RATE: 94 BPM | SYSTOLIC BLOOD PRESSURE: 114 MMHG | TEMPERATURE: 98.5 F | OXYGEN SATURATION: 96 % | RESPIRATION RATE: 20 BRPM | WEIGHT: 117 LBS

## 2018-06-07 DIAGNOSIS — K52.9 CHRONIC DIARRHEA OF UNKNOWN ORIGIN: ICD-10-CM

## 2018-06-07 PROBLEM — K59.09 OTHER CONSTIPATION: Status: RESOLVED | Noted: 2018-04-17 | Resolved: 2018-06-07

## 2018-06-07 PROCEDURE — 99214 OFFICE O/P EST MOD 30 MIN: CPT | Performed by: FAMILY MEDICINE

## 2018-06-07 NOTE — PATIENT INSTRUCTIONS
Diet for Irritable Bowel Syndrome  When you have irritable bowel syndrome (IBS), the foods you eat and your eating habits are very important. IBS may cause various symptoms, such as abdominal pain, constipation, or diarrhea. Choosing the right foods can help ease discomfort caused by these symptoms. Work with your health care provider and dietitian to find the best eating plan to help control your symptoms.  WHAT GENERAL GUIDELINES DO I NEED TO FOLLOW?  · Keep a food diary. This will help you identify foods that cause symptoms. Write down:  ¨ What you eat and when.  ¨ What symptoms you have.  ¨ When symptoms occur in relation to your meals.  · Avoid foods that cause symptoms. Talk with your dietitian about other ways to get the same nutrients that are in these foods.  · Eat more foods that contain fiber. Take a fiber supplement if directed by your dietitian.  · Eat your meals slowly, in a relaxed setting.  · Aim to eat 5-6 small meals per day. Do not skip meals.  · Drink enough fluids to keep your urine clear or pale yellow.  · Ask your health care provider if you should take an over-the-counter probiotic during flare-ups to help restore healthy gut bacteria.  · If you have cramping or diarrhea, try making your meals low in fat and high in carbohydrates. Examples of carbohydrates are pasta, rice, whole grain breads and cereals, fruits, and vegetables.  · If dairy products cause your symptoms to flare up, try eating less of them. You might be able to handle yogurt better than other dairy products because it contains bacteria that help with digestion.  WHAT FOODS ARE NOT RECOMMENDED?  The following are some foods and drinks that may worsen your symptoms:  · Fatty foods, such as French fries.  · Milk products, such as cheese or ice cream.  · Chocolate.  · Alcohol.  · Products with caffeine, such as coffee.  · Carbonated drinks, such as soda.  The items listed above may not be a complete list of foods and beverages to  avoid. Contact your dietitian for more information.  WHAT FOODS ARE GOOD SOURCES OF FIBER?  Your health care provider or dietitian may recommend that you eat more foods that contain fiber. Fiber can help reduce constipation and other IBS symptoms. Add foods with fiber to your diet a little at a time so that your body can get used to them. Too much fiber at once might cause gas and swelling of your abdomen. The following are some foods that are good sources of fiber:  · Apples.  · Peaches.  · Pears.  · Berries.  · Figs.  · Broccoli (raw).  · Cabbage.  · Carrots.  · Raw peas.  · Kidney beans.  · Lima beans.  · Whole grain bread.  · Whole grain cereal.  FOR MORE INFORMATION   International Foundation for Functional Gastrointestinal Disorders: www.iffgd.org  National [place] of Diabetes and Digestive and Kidney Diseases: www.niddk.nih.gov/health-information/health-topics/digestive-diseases/ibs/Pages/facts.aspx     This information is not intended to replace advice given to you by your health care provider. Make sure you discuss any questions you have with your health care provider.     Document Released: 03/09/2005 Document Revised: 01/08/2016 Document Reviewed: 03/20/2015  [employer] Interactive Patient Education ©2016 [employer]  Irritable Bowel Syndrome, Adult  Irritable bowel syndrome (IBS) is not one specific disease. It is a group of symptoms that affects the organs responsible for digestion (gastrointestinal or GI tract).  To regulate how your GI tract works, your body sends signals back and forth between your intestines and your brain. If you have IBS, there may be a problem with these signals. As a result, your GI tract does not function normally. Your intestines may become more sensitive and overreact to certain things. This is especially true when you eat certain foods or when you are under stress.  There are four types of IBS. These may be determined based on the consistency of your stool:  · IBS with  diarrhea.  · IBS with constipation.  · Mixed IBS.  · Unsubtyped IBS.  It is important to know which type of IBS you have. Some treatments are more likely to be helpful for certain types of IBS.  What are the causes?  The exact cause of IBS is not known.  What increases the risk?  You may have a higher risk of IBS if:  · You are a woman.  · You are younger than 45 years old.  · You have a family history of IBS.  · You have mental health problems.  · You have had bacterial infection of your GI tract.  What are the signs or symptoms?  Symptoms of IBS vary from person to person. The main symptom is abdominal pain or discomfort. Additional symptoms usually include one or more of the following:  · Diarrhea, constipation, or both.  · Abdominal swelling or bloating.  · Feeling full or sick after eating a small or regular-size meal.  · Frequent gas.  · Mucus in the stool.  · A feeling of having more stool left after a bowel movement.  Symptoms tend to come and go. They may be associated with stress, psychiatric conditions, or nothing at all.  How is this diagnosed?  There is no specific test to diagnose IBS. Your health care provider will make a diagnosis based on a physical exam, medical history, and your symptoms. You may have other tests to rule out other conditions that may be causing your symptoms. These may include:  · Blood tests.  · X-rays.  · CT scan.  · Endoscopy and colonoscopy. This is a test in which your GI tract is viewed with a long, thin, flexible tube.  How is this treated?  There is no cure for IBS, but treatment can help relieve symptoms. IBS treatment often includes:  · Changes to your diet, such as:  ¨ Eating more fiber.  ¨ Avoiding foods that cause symptoms.  ¨ Drinking more water.  ¨ Eating regular, medium-sized portioned meals.  · Medicines. These may include:  ¨ Fiber supplements if you have constipation.  ¨ Medicine to control diarrhea (antidiarrheal medicines).  ¨ Medicine to help control muscle  spasms in your GI tract (antispasmodic medicines).  ¨ Medicines to help with any mental health issues, such as antidepressants or tranquilizers.  · Therapy.  ¨ Talk therapy may help with anxiety, depression, or other mental health issues that can make IBS symptoms worse.  · Stress reduction.  ¨ Managing your stress can help keep symptoms under control.  Follow these instructions at home:  · Take medicines only as directed by your health care provider.  · Eat a healthy diet.  ¨ Avoid foods and drinks with added sugar.  ¨ Include more whole grains, fruits, and vegetables gradually into your diet. This may be especially helpful if you have IBS with constipation.  ¨ Avoid any foods and drinks that make your symptoms worse. These may include dairy products and caffeinated or carbonated drinks.  ¨ Do not eat large meals.  ¨ Drink enough fluid to keep your urine clear or pale yellow.  · Exercise regularly. Ask your health care provider for recommendations of good activities for you.  · Keep all follow-up visits as directed by your health care provider. This is important.  Contact a health care provider if:  · You have constant pain.  · You have trouble or pain with swallowing.  · You have worsening diarrhea.  Get help right away if:  · You have severe and worsening abdominal pain.  · You have diarrhea and:  ¨ You have a rash, stiff neck, or severe headache.  ¨ You are irritable, sleepy, or difficult to awaken.  ¨ You are weak, dizzy, or extremely thirsty.  · You have bright red blood in your stool or you have black tarry stools.  · You have unusual abdominal swelling that is painful.  · You vomit continuously.  · You vomit blood (hematemesis).  · You have both abdominal pain and a fever.  This information is not intended to replace advice given to you by your health care provider. Make sure you discuss any questions you have with your health care provider.  Document Released: 12/18/2006 Document Revised: 05/19/2017  Document Reviewed: 09/04/2015  [employer] Interactive Patient Education © 2017 [employer]

## 2018-06-08 ENCOUNTER — HOSPITAL ENCOUNTER (OUTPATIENT)
Facility: MEDICAL CENTER | Age: 73
End: 2018-06-08
Attending: FAMILY MEDICINE
Payer: MEDICARE

## 2018-06-08 PROCEDURE — 87329 GIARDIA AG IA: CPT

## 2018-06-08 PROCEDURE — 87493 C DIFF AMPLIFIED PROBE: CPT

## 2018-06-08 PROCEDURE — 87045 FECES CULTURE AEROBIC BACT: CPT

## 2018-06-08 PROCEDURE — 87046 STOOL CULTR AEROBIC BACT EA: CPT

## 2018-06-08 PROCEDURE — 87328 CRYPTOSPORIDIUM AG IA: CPT

## 2018-06-08 PROCEDURE — 87899 AGENT NOS ASSAY W/OPTIC: CPT

## 2018-06-11 DIAGNOSIS — K52.9 CHRONIC DIARRHEA OF UNKNOWN ORIGIN: ICD-10-CM

## 2018-06-12 LAB
C DIFF DNA SPEC QL NAA+PROBE: NEGATIVE
C DIFF TOX GENS STL QL NAA+PROBE: NEGATIVE
G LAMBLIA+C PARVUM AG STL QL RAPID: NORMAL
SIGNIFICANT IND 70042: NORMAL
SITE SITE: NORMAL
SOURCE SOURCE: NORMAL

## 2018-06-14 ENCOUNTER — TELEPHONE (OUTPATIENT)
Dept: PHYSICAL THERAPY | Facility: MEDICAL CENTER | Age: 73
End: 2018-06-14

## 2018-06-14 ENCOUNTER — TELEPHONE (OUTPATIENT)
Dept: MEDICAL GROUP | Facility: MEDICAL CENTER | Age: 73
End: 2018-06-14

## 2018-06-14 ENCOUNTER — PHYSICAL THERAPY (OUTPATIENT)
Dept: PHYSICAL THERAPY | Facility: MEDICAL CENTER | Age: 73
End: 2018-06-14
Attending: FAMILY MEDICINE
Payer: MEDICARE

## 2018-06-14 DIAGNOSIS — M79.601 PAIN OF RIGHT UPPER EXTREMITY: ICD-10-CM

## 2018-06-14 DIAGNOSIS — R19.7 DIARRHEA, UNSPECIFIED TYPE: ICD-10-CM

## 2018-06-14 LAB
BACTERIA STL CULT: NORMAL
E COLI SXT1+2 STL IA: NORMAL
E COLI SXT1+2 STL IA: NORMAL
SIGNIFICANT IND 70042: NORMAL
SIGNIFICANT IND 70042: NORMAL
SITE SITE: NORMAL
SITE SITE: NORMAL
SOURCE SOURCE: NORMAL
SOURCE SOURCE: NORMAL

## 2018-06-14 PROCEDURE — 97014 ELECTRIC STIMULATION THERAPY: CPT | Mod: KX

## 2018-06-14 PROCEDURE — 97140 MANUAL THERAPY 1/> REGIONS: CPT | Mod: KX

## 2018-06-14 PROCEDURE — 97110 THERAPEUTIC EXERCISES: CPT | Mod: KX

## 2018-06-14 RX ORDER — LEVOTHYROXINE SODIUM 0.05 MG/1
50 TABLET ORAL
Qty: 30 TAB | Refills: 2 | Status: SHIPPED | OUTPATIENT
Start: 2018-06-14 | End: 2018-06-26 | Stop reason: SDUPTHER

## 2018-06-14 RX ORDER — DIPHENOXYLATE HYDROCHLORIDE AND ATROPINE SULFATE 2.5; .025 MG/1; MG/1
1 TABLET ORAL 4 TIMES DAILY PRN
Qty: 40 TAB | Refills: 0 | Status: SHIPPED
Start: 2018-06-14 | End: 2018-06-24

## 2018-06-14 NOTE — TELEPHONE ENCOUNTER
Prescription for Lomotil printed off, please fax.  I have also sent in a prescription for a lower dose of thyroid medication, please notify patient on both prescriptions.

## 2018-06-14 NOTE — TELEPHONE ENCOUNTER
Patient advised of message below     She states this has been going on for 21 days and has tried multiple things over the counter and nothing is working.     Patient is requesting an rx for the diarrhea. She states she does have an appointment next week, however the diarrhea is bad.     Please advise.

## 2018-06-14 NOTE — TELEPHONE ENCOUNTER
Pt notified. She said she doesn't feel right taking the thyroid medication she would like to go to her doctor before taking anything new but she thanks you and said you are a life saver.

## 2018-06-14 NOTE — OP THERAPY DAILY TREATMENT
Outpatient Physical Therapy  DAILY TREATMENT     [facility]  [address]  [place] [address]  Phone:  [phone]  Fax:  [phone]    Date: 06/14/2018    Patient: Joie Hart  YOB: 1945  MRN: [id number]     Time Calculation  Start time: 1000  Stop time: 1045 Time Calculation (min): 45 minutes     Chief Complaint: Shoulder Problem (Tightness)    Visit #: 12    SUBJECTIVE:  Pt reports stiffness in her shoulders, right>left.    OBJECTIVE:  Current objective measures:   Increased tone/tension throughout right cervical musculature      Therapeutic Exercises (CPT 28766):     1. UBE, 1:30/1:30 fwd and bkwd    2. Review of chin tuck for HEP, 10x prone chin tuck with L1 TB and 10x chin tuck against wall    Therapeutic Treatments and Modalities:     1. Manual Therapy (CPT 74439), IASTM right>left UT/cervical musculature, manual stretch to bilateral UT/LAD to right UE, 1st rib caudal glides, manual cervical traction x3 minutes    2. E Stim Unattended (CPT 75193), IFC +MHP to right cervical spine while on 1/2 foam roller and performing pec stretch, x15 minutes    Time-based treatments/modalities:  Manual therapy minutes (CPT 06255): 20 minutes  Therapeutic exercise minutes (CPT 86106): 10 minutes       Pain rating before treatment: 2  Pain rating after treatment: 1    ASSESSMENT:   Response to treatment: Pt demonstrates fair awareness/understanding of all HEP and is ready to begin progression of scapular stabilization to improve posture and core strength overall. This will help to restore full functional use of bilateral UEs.    PLAN/RECOMMENDATIONS:   Plan for treatment: therapy treatment to continue next visit.  Planned interventions for next visit: continue with current treatment, E-stim unattended (CPT 00704), hot/cold pack therapy (CPT 11575), manual therapy (CPT 42665), neuromuscular re-education (CPT 37317), therapeutic activities  (CPT 48670) and therapeutic exercise (CPT 78303); begin increasing scapular stability exercises.

## 2018-06-14 NOTE — TELEPHONE ENCOUNTER
Pt came into office regarding her recurring episodes of diarrhea, she is still experiencing severe diarrhea despite use of imodium and the BRAT diet. States she's having at least 5 bowel movements per day. Stool is watery, brown,  undigested food present with pungent odor. Pt states she conscious about hydration.     Pt has had stool culture, C.Diff, and Crypto/Giardia performed, still awaiting results on culture. She is asking about any potential medications that can help her. She has lost a significant amount of weight and is to the point of tears.     She is also requesting an O&P be performed as well.

## 2018-06-14 NOTE — TELEPHONE ENCOUNTER
It looks like her thyroid is overtreated based on recent lab work, that can cause diarrhea.  If patient is still experiencing diarrhea, she should follow-up in clinic.  Slava Cantu M.D.

## 2018-06-15 NOTE — PROGRESS NOTES
Subjective:     Chief Complaint   Patient presents with   • Diarrhea     x 2 weeks; on BRAT diet without relief        Joie Hart is a 72 y.o. female here today for evaluation and management of:    Chronic diarrhea of unknown origin  Symptoms started 5/26/18.  She is having 5 watery a day.  She had antibiotics in January for presumed C. Diff but her testing was negative.  She denies any pain. She has been loosing weight but is hungry all the time.  She started the BRAT diet a week and that has not helped.  She tried imodium for several days on 5/26/18 but without effect.       Allergies   Allergen Reactions   • Codeine Rash     Rash all over           Current medicines (including changes today)  Current Outpatient Prescriptions   Medication Sig Dispense Refill   • amitriptyline (ELAVIL) 150 MG Tab TAKE 1 TABLET BY MOUTH AT BEDTIME AS NEEDED 90 Tab 3   • Magnesium 100 MG Cap Take  by mouth.     • Oral Electrolytes (EMERGEN-C ELECTRO MIX PO) Take  by mouth.     • therapeutic multivitamin-minerals (THERAGRAN-M) Tab Take 1 Tab by mouth every day.     • Probiotic Product (PROBIOTIC DAILY PO) Take  by mouth.     • diphenoxylate-atropine (LOMOTIL) 2.5-0.025 MG Tab Take 1 Tab by mouth 4 times a day as needed for Diarrhea for up to 10 days. 40 Tab 0   • levothyroxine (SYNTHROID) 50 MCG Tab Take 1 Tab by mouth Every morning on an empty stomach. 30 Tab 2   • hydrOXYzine HCl (ATARAX) 25 MG Tab Take 25 mg by mouth.     • Celecoxib (CELEBREX PO) Take  by mouth.     • tramadol (ULTRAM) 50 MG Tab Take 50 mg by mouth every four hours as needed.     • MethylPREDNISolone (MEDROL DOSEPAK) 4 MG Tablet Therapy Pack Take 1 Tab by mouth every day. (Patient not taking: Reported on 6/1/2018) 1 Kit 0   • fluconazole (DIFLUCAN) 150 MG tablet Take 1 Tab by mouth every day. (Patient not taking: Reported on 6/7/2018) 1 Tab 0   • ACYCLOVIR PO Take  by mouth.     • acetaminophen (TYLENOL) 500 MG Tab Take 500-1,000 mg by mouth 2 Times a  Day.     • Omega-3 Fatty Acids (FISH OIL) 1000 MG Cap capsule Take 1,000 mg by mouth 3 times a day, with meals.     • cyanocobalamin (VITAMIN B-12) 500 MCG Tab Take 500 mcg by mouth every day.     • coenzyme Q-10 30 MG capsule Take 60 mg by mouth every day.     • VITAMIN A PO Take  by mouth.       No current facility-administered medications for this visit.        She  has a past medical history of Allergy; Anemia; Anxiety; Cataract; Chronic back pain; Depression (2009); Diverticulitis; Glaucoma; Head injury, closed (3/6/16); HSV infection; IBD (inflammatory bowel disease); Migraine; Pelvic fracture (HCC) (1976); Spondylisthesis; Thyroid disease; and Vaginal vault prolapse (2006).    Patient Active Problem List    Diagnosis Date Noted   • Chronic diarrhea of unknown origin 06/07/2018   • Hematuria 04/11/2018   • Pain of right upper extremity 03/27/2018   • Chronic pain syndrome 03/27/2018   • Abnormal CT of the abdomen 03/27/2018   • Chronic headaches 10/05/2017   • Chronic non-specific white matter lesions on MRI 10/05/2017   • Chronic left-sided low back pain with sciatica 07/25/2017   • Seasonal allergic rhinitis due to pollen 05/26/2017   • B12 deficiency 12/20/2016   • Spondylisthesis    • Acquired cyst of kidney 03/23/2016   • Lymphocytic thyroiditis 03/23/2016   • Herpes simplex virus (HSV) infection 11/07/2012   • Other hyperlipidemia 11/07/2012   • Chronic urticaria 11/07/2012   • Osteopenia 11/06/2012       ROS   No fever or chills.  No nausea or vomiting.  No chest pain or palpitations.  No cough or SOB.  No pain with urination or hematuria.  No bloody stools.       Objective:     Blood pressure 114/70, pulse 94, temperature 36.9 °C (98.5 °F), resp. rate 20, height 1.524 m (5'), weight 53.1 kg (117 lb), SpO2 96 %. Body mass index is 22.85 kg/m².   Physical Exam:  Well developed, well nourished.  Alert, oriented in no acute distress.  Eye contact is good, speech goal directed, affect calm  Eyes:  conjunctiva non-injected, sclera non-icteric.  Neck Supple.  No adenopathy or masses in the neck or supraclavicular regions. No thyromegaly  Lungs: clear to auscultation bilaterally with good excursion. No wheezes or rhonchi  CV: regular rate and rhythm. No murmur  Abdomen: soft, mild diffuse tenderness, no masses or organomegaly.  No rebound or guarding.  Hyperacrtve BS in all 4 quadrants.          Assessment and Plan:   The following treatment plan was discussed    1. Chronic diarrhea of unknown origin  Check stool samples.  Await results.  Increase fluids.  Consider Colestid and call GI for follow-up    - CDIFF BY PCR; Future  - CRYPTO/GIARDIA RAPID ASSAY; Future  - CULTURE STOOL; Future  - E COLI SHIGA TOXIN EIA  - SALMONELLA/SHIGELLA SCREEN    Any change or worsening of signs or symptoms, patient encouraged to follow-up or report to the emergency room for further evaluation. Patient understands and agrees.    Followup: Return if symptoms worsen or fail to improve.

## 2018-06-21 ENCOUNTER — PHYSICAL THERAPY (OUTPATIENT)
Dept: PHYSICAL THERAPY | Facility: MEDICAL CENTER | Age: 73
End: 2018-06-21
Attending: FAMILY MEDICINE
Payer: MEDICARE

## 2018-06-21 DIAGNOSIS — M79.601 PAIN OF RIGHT UPPER EXTREMITY: ICD-10-CM

## 2018-06-21 PROCEDURE — 97014 ELECTRIC STIMULATION THERAPY: CPT

## 2018-06-21 PROCEDURE — 97110 THERAPEUTIC EXERCISES: CPT

## 2018-06-21 PROCEDURE — 97140 MANUAL THERAPY 1/> REGIONS: CPT

## 2018-06-21 NOTE — OP THERAPY DAILY TREATMENT
Outpatient Physical Therapy  DAILY TREATMENT     [facility] Outpatient Physical Therapy  [address]  [place] [address]  Phone:  [phone]  Fax:  [phone]    Date: 06/21/2018    Patient: Joie Hart  YOB: 1945  MRN: [id number]     Time Calculation  Start time: 1000  Stop time: 1045 Time Calculation (min): 45 minutes     Chief Complaint: Neck Pain (Right cervical) and Neck Pain (Left arm)    Visit #: 13    SUBJECTIVE:  Pt offers no new complaints at this time; continues to have stiffness in her right neck and occasional pains in her left arm.     OBJECTIVE:  Current objective measures:   Increased tone/tension throughout right cervical musculature      Therapeutic Exercises (CPT 00762):     1. UBE, 1:30/1:30 fwd and bkwd    2. Shoulder shrugs with backwards roll, x20, 3# weights    3. Scapular retraction with punches, x20, 3# weights    4. Shoulder flexion in scaption, x10, 3# weights    5. Shoulder abduction , x10, 3# weights    6. Radial nerve glide, x20    Therapeutic Treatments and Modalities:     1. Manual Therapy (CPT 93348), IASTM right>left UT/cervical musculature, manual stretch to bilateral UT/LAD to right UE, 1st rib caudal glides, manual cervical traction x3 minutes    2. E Stim Unattended (CPT 02835), IFC +MHP to right cervical spine while on 1/2 foam roller and performing pec stretch, x15 minutes    Time-based treatments/modalities:  Manual therapy minutes (CPT 18282): 10 minutes  Therapeutic exercise minutes (CPT 03771): 20 minutes       Pain rating before treatment: 2  Pain rating after treatment: 1    ASSESSMENT:   Response to treatment: Pt continues to have symptoms of cervical referral pain/limited UE functional strength. Pt was given exercises to help improve functional abilities at home: lifting/housework related activities. Pt will benefit from 1-2 more skilled PT sessions to further progress these exercises and promote increased  functional use of bilateral UEs.     PLAN/RECOMMENDATIONS:   Plan for treatment: therapy treatment to continue next visit.  Planned interventions for next visit: continue with current treatment, E-stim unattended (CPT 98619), hot/cold pack therapy (CPT 56801), manual therapy (CPT 52853), neuromuscular re-education (CPT 48352), therapeutic activities (CPT 91501) and therapeutic exercise (CPT 16148); begin increasing scapular stability exercises.

## 2018-06-26 ENCOUNTER — OFFICE VISIT (OUTPATIENT)
Dept: MEDICAL GROUP | Facility: MEDICAL CENTER | Age: 73
End: 2018-06-26
Payer: MEDICARE

## 2018-06-26 VITALS
OXYGEN SATURATION: 95 % | HEIGHT: 60 IN | BODY MASS INDEX: 23.16 KG/M2 | DIASTOLIC BLOOD PRESSURE: 68 MMHG | HEART RATE: 98 BPM | TEMPERATURE: 98.7 F | WEIGHT: 118 LBS | RESPIRATION RATE: 20 BRPM | SYSTOLIC BLOOD PRESSURE: 108 MMHG

## 2018-06-26 DIAGNOSIS — K52.9 CHRONIC DIARRHEA OF UNKNOWN ORIGIN: ICD-10-CM

## 2018-06-26 DIAGNOSIS — M54.2 PAIN OF CERVICAL SPINE: ICD-10-CM

## 2018-06-26 DIAGNOSIS — M79.601 PAIN OF RIGHT UPPER EXTREMITY: ICD-10-CM

## 2018-06-26 DIAGNOSIS — E03.9 ACQUIRED HYPOTHYROIDISM: ICD-10-CM

## 2018-06-26 PROCEDURE — 99214 OFFICE O/P EST MOD 30 MIN: CPT | Performed by: FAMILY MEDICINE

## 2018-06-26 RX ORDER — LEVOTHYROXINE SODIUM 88 UG/1
88 TABLET ORAL
Qty: 30 TAB | Refills: 0 | COMMUNITY
Start: 2018-06-26 | End: 2019-04-17

## 2018-06-26 RX ORDER — DIPHENOXYLATE HYDROCHLORIDE AND ATROPINE SULFATE 2.5; .025 MG/1; MG/1
1 TABLET ORAL 4 TIMES DAILY PRN
Qty: 40 TAB | Refills: 0 | Status: SHIPPED | OUTPATIENT
Start: 2018-06-26 | End: 2018-07-06

## 2018-06-27 NOTE — ASSESSMENT & PLAN NOTE
Sees the endocrinologist for this - Dr. Shannon.  They decreased her dose from 88 mcg daily 6 days a week and 1 1/2 once a week to just 88 mcg daily.  She is hoping that this resolves the diarrhea.

## 2018-06-28 ENCOUNTER — APPOINTMENT (OUTPATIENT)
Dept: PHYSICAL THERAPY | Facility: MEDICAL CENTER | Age: 73
End: 2018-06-28
Attending: FAMILY MEDICINE
Payer: MEDICARE

## 2018-06-28 NOTE — PROGRESS NOTES
Subjective:     Chief Complaint   Patient presents with   • Tendonitis     Both Elbows   • Follow-Up     follow up on chronic diarrhea       Joie Hart is a 73 y.o. female here today for evaluation and management of:    Acquired hypothyroidism  Sees the endocrinologist for this - Dr. Shannon.  They decreased her dose from 88 mcg daily 6 days a week and 1 1/2 once a week to just 88 mcg daily.  She is hoping that this resolves the diarrhea.    Pain of cervical spine  Patient has been going to physical therapy for her neck, shoulder and right upper arm pain. This seems to be helping with her pain. She reports that the physical therapist suggested that she get a MRI of the neck. She isn't having numbness or weakness. Her symptoms are improving. She's never had an x-ray of the spine. Her range of motion has improved dramatically.    Chronic diarrhea of unknown origin  Patient's diarrhea improved with Lomotil. She is scheduled to see a gastroenterologist but is wondering if she should cancel it now that her symptoms are gone. She stopped the Lomotil 3 days ago and she had a soft stool today but no diarrhea. She is able to eat whatever she wants now and has gained a pound back. She denies any nausea or vomiting.       Allergies   Allergen Reactions   • Codeine Rash     Rash all over           Current medicines (including changes today)  Current Outpatient Prescriptions   Medication Sig Dispense Refill   • levothyroxine (SYNTHROID) 88 MCG Tab Take 1 Tab by mouth Every morning on an empty stomach. 30 Tab 0   • diphenoxylate-atropine (LOMOTIL) 2.5-0.025 MG Tab Take 1 Tab by mouth 4 times a day as needed for Diarrhea for up to 10 days. 40 Tab 0   • amitriptyline (ELAVIL) 150 MG Tab TAKE 1 TABLET BY MOUTH AT BEDTIME AS NEEDED 90 Tab 3   • Magnesium 100 MG Cap Take  by mouth.     • Oral Electrolytes (EMERGEN-C ELECTRO MIX PO) Take  by mouth.     • Omega-3 Fatty Acids (FISH OIL) 1000 MG Cap capsule Take 1,000 mg by mouth  3 times a day, with meals.     • cyanocobalamin (VITAMIN B-12) 500 MCG Tab Take 500 mcg by mouth every day.     • therapeutic multivitamin-minerals (THERAGRAN-M) Tab Take 1 Tab by mouth every day.     • Probiotic Product (PROBIOTIC DAILY PO) Take  by mouth.     • hydrOXYzine HCl (ATARAX) 25 MG Tab Take 25 mg by mouth.     • tramadol (ULTRAM) 50 MG Tab Take 50 mg by mouth every four hours as needed.     • ACYCLOVIR PO Take  by mouth.       No current facility-administered medications for this visit.        She  has a past medical history of Allergy; Anemia; Anxiety; Cataract; Chronic back pain; Depression (2009); Diverticulitis; Glaucoma; Head injury, closed (3/6/16); HSV infection; IBD (inflammatory bowel disease); Migraine; Pelvic fracture (HCC) (1976); Spondylisthesis; Thyroid disease; and Vaginal vault prolapse (2006).    Patient Active Problem List    Diagnosis Date Noted   • Pain of cervical spine 06/26/2018   • Acquired hypothyroidism 06/26/2018   • Chronic diarrhea of unknown origin 06/07/2018   • Hematuria 04/11/2018   • Pain of right upper extremity 03/27/2018   • Chronic pain syndrome 03/27/2018   • Abnormal CT of the abdomen 03/27/2018   • Chronic headaches 10/05/2017   • Chronic non-specific white matter lesions on MRI 10/05/2017   • Chronic left-sided low back pain with sciatica 07/25/2017   • Seasonal allergic rhinitis due to pollen 05/26/2017   • B12 deficiency 12/20/2016   • Spondylisthesis    • Acquired cyst of kidney 03/23/2016   • Lymphocytic thyroiditis 03/23/2016   • Herpes simplex virus (HSV) infection 11/07/2012   • Other hyperlipidemia 11/07/2012   • Chronic urticaria 11/07/2012   • Osteopenia 11/06/2012       ROS   No fever or chills.  No nausea or vomiting.  No chest pain or palpitations.  No cough or SOB.  No pain with urination or hematuria.  No black or bloody stools.       Objective:     Blood pressure 108/68, pulse 98, temperature 37.1 °C (98.7 °F), resp. rate 20, height 1.524 m (5'),  weight 53.5 kg (118 lb), SpO2 95 %. Body mass index is 23.05 kg/m².   Physical Exam:  Well developed, well nourished.  Alert, oriented in no acute distress.  Eye contact is good, speech goal directed, affect calm  Eyes: conjunctiva non-injected, sclera non-icteric.  Neck Supple.  No adenopathy or masses in the neck or supraclavicular regions. No thyromegaly  Neck exam: No spinal tenderness to palpation. Normal flexion, extension and lateral rotation ROM. Spurling's test negative.  Some spasm in the right trapezius   Shoulder/arm exam: No deformity, erythema, edema or ecchymosis. Tenderness to palpation none. ROM intact . Hawkin's test negative. Neer's testnegatove. 5/5 strength bilaterally.   Elbow/forearm: Tenderness to palpation: none. Full ROM .  5/5 strength throughout bilaterally. 2+ DTR biceps and triceps bilaterally.   Lungs: clear to auscultation bilaterally with good excursion. No wheezes or rhonchi  CV: regular rate and rhythm. No murmur  Abdomen: soft, nontender, no masses or organomegaly.  No rebound or guarding           Assessment and Plan:   The following treatment plan was discussed    1. Pain of right upper extremity  Continue physical therapy as this is helping. I believe this is radicular in nature. If not improving get an x-ray of the cervical spine.  - DX-CERVICAL SPINE-2 OR 3 VIEWS; Future    2. Pain of cervical spine  See #1  - DX-CERVICAL SPINE-2 OR 3 VIEWS; Future    3. Acquired hypothyroidism  Continue follow-up with endocrinology    4. Chronic diarrhea of unknown origin  I have strongly encouraged patient to keep her appointment with gastroenterology. Over replacement of the thyroid could be contributing to this. Consider cholestyramine to control diarrhea if it returns. Refilled Lomotil but discussed that this is a temporary solution not a long-term solution  - diphenoxylate-atropine (LOMOTIL) 2.5-0.025 MG Tab; Take 1 Tab by mouth 4 times a day as needed for Diarrhea for up to 10 days.   Dispense: 40 Tab; Refill: 0    Any change or worsening of signs or symptoms, patient encouraged to follow-up or report to the emergency room for further evaluation. Patient understands and agrees.    Followup: Return in about 3 months (around 9/26/2018).

## 2018-06-28 NOTE — ASSESSMENT & PLAN NOTE
Patient has been going to physical therapy for her neck, shoulder and right upper arm pain. This seems to be helping with her pain. She reports that the physical therapist suggested that she get a MRI of the neck. She isn't having numbness or weakness. Her symptoms are improving. She's never had an x-ray of the spine. Her range of motion has improved dramatically.

## 2018-06-28 NOTE — ASSESSMENT & PLAN NOTE
Patient's diarrhea improved with Lomotil. She is scheduled to see a gastroenterologist but is wondering if she should cancel it now that her symptoms are gone. She stopped the Lomotil 3 days ago and she had a soft stool today but no diarrhea. She is able to eat whatever she wants now and has gained a pound back. She denies any nausea or vomiting.

## 2018-07-12 ENCOUNTER — HOSPITAL ENCOUNTER (OUTPATIENT)
Dept: LAB | Facility: MEDICAL CENTER | Age: 73
End: 2018-07-12
Attending: PHYSICIAN ASSISTANT
Payer: MEDICARE

## 2018-07-12 PROCEDURE — 36415 COLL VENOUS BLD VENIPUNCTURE: CPT

## 2018-07-12 PROCEDURE — 84443 ASSAY THYROID STIM HORMONE: CPT

## 2018-07-12 PROCEDURE — 84439 ASSAY OF FREE THYROXINE: CPT

## 2018-07-13 ENCOUNTER — PHYSICAL THERAPY (OUTPATIENT)
Dept: PHYSICAL THERAPY | Facility: MEDICAL CENTER | Age: 73
End: 2018-07-13
Attending: FAMILY MEDICINE
Payer: MEDICARE

## 2018-07-13 DIAGNOSIS — M79.601 PAIN OF RIGHT UPPER EXTREMITY: ICD-10-CM

## 2018-07-13 LAB
T4 FREE SERPL-MCNC: 1.13 NG/DL (ref 0.53–1.43)
TSH SERPL DL<=0.005 MIU/L-ACNC: 0.13 UIU/ML (ref 0.38–5.33)

## 2018-07-13 PROCEDURE — G8992 OTHER PT/OT  D/C STATUS: HCPCS | Mod: CI

## 2018-07-13 PROCEDURE — G8991 OTHER PT/OT GOAL STATUS: HCPCS | Mod: CI

## 2018-07-13 PROCEDURE — 97110 THERAPEUTIC EXERCISES: CPT | Mod: KX

## 2018-07-13 PROCEDURE — 97014 ELECTRIC STIMULATION THERAPY: CPT | Mod: KX

## 2018-07-13 PROCEDURE — 97140 MANUAL THERAPY 1/> REGIONS: CPT | Mod: KX

## 2018-07-13 PROCEDURE — G8990 OTHER PT/OT CURRENT STATUS: HCPCS | Mod: CI

## 2018-07-13 NOTE — LETTER
July 13, 2018    Monae Beck M.D.  [address]  [address]  [place] [address]      Re:  Joie Hart          Dear Dr. Beck,    It was a pleasure seeing your patient, Joie Hart, on 7/13/2018 for her final therapy visit.     Please find enclosed a copy of the patient's discharge summary from outpatient physical therapy services.          On behalf of the staff at [facility], we would like to thank you for your referrals.  We appreciate your confidence in our clinic and will continue to work hard to provide the best outcomes for your patients.    We sincerely enjoy treating your patients and hope that you have been happy with their care.  Thank you again, and please call with any questions, concerns or ways that we can best meet your needs.        Sincerely,          Julita Briones, PT, DPT    No Recipients              Outpatient Physical Therapy  DISCHARGE SUMMARY NOTE      [facility]  [address]  [place] [address]  Phone:  [phone]  Fax:  [phone]    Date of Visit: 07/13/2018    Patient: Joie Hart  YOB: 1945  MRN: [id number]     Referring Provider: Monae Beck M.D.  [address]  [address]  [place] [address]   Referring Diagnosis Pain in right arm [M79.601]     Physical Therapy Occurrence Codes    Date of onset of impairment:  11/24/17   Date physical therapy care plan established or reviewed:  4/13/18   Date physical therapy treatment started:  4/13/18          Functional Limitation G-Codes and Severity Modifiers  PT Functional Assessment Tool Used: NDI  PT Functional Assessment Score: 11 (From 25)  Neck Disability Total: 11   Goal: Other PT/OT Primary: Goal (): CI   Discharge: Other PT/OT Primary: Discharge (): CI       Your patient is being discharged from Physical Therapy with the following comments:   · Goals partially met    Comments:  Pt  has met all goals at 85% or better and is ready for D/C to an independent HEP.      Limitations Remaining:  Please see daily note for objective measures.    Recommendations:  Please refer this patient back if any further needs arise.    Julita Briones, PT, DPT    Date: 7/13/2018

## 2018-07-13 NOTE — OP THERAPY DAILY TREATMENT
Outpatient Physical Therapy  DAILY TREATMENT     [facility] Outpatient Physical Therapy  [address]  [place] [address]  Phone:  [phone]  Fax:  [phone]    Date: 07/13/2018    Patient: Joie Hart  YOB: 1945  MRN: [id number]     Time Calculation  Start time: 0930  Stop time: 1015 Time Calculation (min): 45 minutes     Chief Complaint: Shoulder Pain    Visit #: 14    SUBJECTIVE:  Pt reports that she has been doing fairly well, but she did have a fall about 1 week ago. Pt reports that she now has tailbone pain that is very severe when she puts pressure on it.     OBJECTIVE:  Current objective measures:   (+)impingement on right shoulder  Strength:  3/5 ER bilaterally  4/5 IR/abduction/flexion bilaterally    Performed testing on patient lowback/pelvis:  (-)pain with PROM of bilateral hips/lumbar spine across all planes  (+)sacral thrust/compression  (-)SLR      Therapeutic Exercises (CPT 63312):     1. UBE, 1:30/1:30 fwd and bkwd    2. Review of all prior exercises prescribed for HEP    3. ER isometric walkout, L2 TB 5 reps x5'    4. ER with band, L2 2x15      Therapeutic Exercise Summary: Pt given H/O with pictures and descriptions of HEP    Therapeutic Treatments and Modalities:     1. Manual Therapy (CPT 12349), IASTM right>left UT/cervical musculature, manual stretch to bilateral UT/LAD to right UE, 1st rib caudal glides, manual cervical traction x3 minutes, See objective measures for all testing today    2. E Stim Unattended (CPT 52201), IFC +MHP to right cervical spine while on 1/2 foam roller and performing pec stretch, x15 minutes    Time-based treatments/modalities:  Manual therapy minutes (CPT 63060): 15 minutes  Therapeutic exercise minutes (CPT 73854): 15 minutes       Pain rating before treatment: 0  Pain rating after treatment: 0    ASSESSMENT:   Response to treatment: Pt continues to demonstrate gradual improvements in her overall symptoms  related to her neck/shoulders/UEs. Pt understands that she will need to continue all exercises independently. Pt is ready for D/C at this time.     PLAN/RECOMMENDATIONS:   Plan for treatment: discharge patient due to accomplished goals.  Planned interventions for next visit: Discharge to independent HEP

## 2018-07-13 NOTE — OP THERAPY DISCHARGE SUMMARY
Outpatient Physical Therapy  DISCHARGE SUMMARY NOTE      [facility] Outpatient Physical Therapy  [address]  [place] [address]  Phone:  [phone]  Fax:  [phone]    Date of Visit: 07/13/2018    Patient: Joie Hart  YOB: 1945  MRN: [id number]     Referring Provider: Monae Beck M.D.  [address]  [address]  [place] [address]   Referring Diagnosis Pain in right arm [M79.601]     Physical Therapy Occurrence Codes    Date of onset of impairment:  11/24/17   Date physical therapy care plan established or reviewed:  4/13/18   Date physical therapy treatment started:  4/13/18          Functional Limitation G-Codes and Severity Modifiers  PT Functional Assessment Tool Used: NDI  PT Functional Assessment Score: 11 (From 25)  Neck Disability Total: 11   Goal: Other PT/OT Primary: Goal (): CI   Discharge: Other PT/OT Primary: Discharge (): CI       Your patient is being discharged from Physical Therapy with the following comments:   · Goals partially met    Comments:  Pt has met all goals at 85% or better and is ready for D/C to an independent HEP.      Limitations Remaining:  Please see daily note for objective measures.    Recommendations:  Please refer this patient back if any further needs arise.    Julita Briones, PT, DPT    Date: 7/13/2018

## 2018-07-19 ENCOUNTER — APPOINTMENT (OUTPATIENT)
Dept: PHYSICAL THERAPY | Facility: MEDICAL CENTER | Age: 73
End: 2018-07-19
Attending: FAMILY MEDICINE
Payer: MEDICARE

## 2018-07-24 ENCOUNTER — APPOINTMENT (OUTPATIENT)
Dept: PHYSICAL THERAPY | Facility: MEDICAL CENTER | Age: 73
End: 2018-07-24
Attending: FAMILY MEDICINE
Payer: MEDICARE

## 2018-07-26 ENCOUNTER — OFFICE VISIT (OUTPATIENT)
Dept: MEDICAL GROUP | Facility: MEDICAL CENTER | Age: 73
End: 2018-07-26
Payer: MEDICARE

## 2018-07-26 VITALS
RESPIRATION RATE: 20 BRPM | HEIGHT: 60 IN | DIASTOLIC BLOOD PRESSURE: 60 MMHG | HEART RATE: 88 BPM | SYSTOLIC BLOOD PRESSURE: 116 MMHG | OXYGEN SATURATION: 96 % | WEIGHT: 122 LBS | BODY MASS INDEX: 23.95 KG/M2 | TEMPERATURE: 96.7 F

## 2018-07-26 DIAGNOSIS — M79.18 ACUTE BUTTOCK PAIN: ICD-10-CM

## 2018-07-26 PROCEDURE — 99214 OFFICE O/P EST MOD 30 MIN: CPT | Performed by: FAMILY MEDICINE

## 2018-07-26 ASSESSMENT — PAIN SCALES - GENERAL: PAINLEVEL: 5=MODERATE PAIN

## 2018-07-26 NOTE — ASSESSMENT & PLAN NOTE
Patient slipped and fell on a stone step and landed on her butt on 7/10/18.  Pain is improving but it is still limiting her activitiy.  She can't sit for long periods of time or bending over.  She is able to walk okay.  In 1976, in [place] that had pelvic fracture

## 2018-07-26 NOTE — PATIENT INSTRUCTIONS
Tailbone Injury  The tailbone (coccyx) is the small bone at the lower end of the spine. A tailbone injury may involve stretched ligaments, bruising, or a broken bone (fracture). Tailbone injuries can be painful, and some may take a long time to heal.  What are the causes?  This condition is often caused by falling and landing on the tailbone. Other causes include:  · Repeated strain or friction from actions such as rowing and bicycling.  · Childbirth.  In some cases, the cause may not be known.  What increases the risk?  This condition is more common in women than in men.  What are the signs or symptoms?  Symptoms of this condition include:  · Pain in the lower back, especially when sitting.  · Pain or difficulty when standing up from a sitting position.  · Bruising in the tailbone area.  · Painful bowel movements.  · In women, pain during intercourse.  How is this diagnosed?  This condition may be diagnosed based on your symptoms and a physical exam. X-rays may be taken if a fracture is suspected. You may also have other tests, such as a CT scan or MRI.  How is this treated?  This condition may be treated with medicines to help relieve your pain. Most tailbone injuries heal on their own in 4-6 weeks. However, recovery time may be longer if the injury involves a fracture.  Follow these instructions at home:  · Take medicines only as directed by your health care provider.  · If directed, apply ice to the injured area:  ¨ Put ice in a plastic bag.  ¨ Place a towel between your skin and the bag.  ¨ Leave the ice on for 20 minutes, 2-3 times per day for the first 1-2 days.  · Sit on a large, rubber or inflated ring or cushion to ease your pain. Lean forward when you are sitting to help decrease discomfort.  · Avoid sitting for long periods of time.  · Increase your activity as the pain allows. Perform any exercises that are recommended by your health care provider or physical therapist.  · If you have pain during bowel  movements, use stool softeners as directed by your health care provider.  · Eat a diet that includes plenty of fiber to help prevent constipation.  · Keep all follow-up visits as directed by your health care provider. This is important.  How is this prevented?  Wear appropriate padding and sports gear when bicycling and rowing. This can help to prevent developing an injury that is caused by repeated strain or friction.  Contact a health care provider if:  · Your pain becomes worse.  · Your bowel movements cause a great deal of discomfort.  · You are unable to have a bowel movement.  · You have uncontrolled urine loss (urinary incontinence).  · You have a fever.  This information is not intended to replace advice given to you by your health care provider. Make sure you discuss any questions you have with your health care provider.  Document Released: 12/15/2001 Document Revised: 08/17/2017 Document Reviewed: 12/14/2015  [employer] Interactive Patient Education © 2017 [employer]

## 2018-07-26 NOTE — PROGRESS NOTES
Subjective:     Chief Complaint   Patient presents with   • Fall     x 2 weeks; fell down rock stairs, pain in tailbone & gluteal area       Joie Hart is a 73 y.o. female here today for evaluation and management of:    Acute buttock pain  Patient slipped and fell on a stone step and landed on her butt on 7/10/18.  Pain is improving but it is still limiting her activitiy.  She can't sit for long periods of time or bending over.  She is able to walk okay.  In 1976, in [facility] that had pelvic fracture       Allergies   Allergen Reactions   • Codeine Rash     Rash all over           Current medicines (including changes today)  Current Outpatient Prescriptions   Medication Sig Dispense Refill   • levothyroxine (SYNTHROID) 88 MCG Tab Take 1 Tab by mouth Every morning on an empty stomach. 30 Tab 0   • hydrOXYzine HCl (ATARAX) 25 MG Tab Take 25 mg by mouth.     • amitriptyline (ELAVIL) 150 MG Tab TAKE 1 TABLET BY MOUTH AT BEDTIME AS NEEDED 90 Tab 3   • Magnesium 100 MG Cap Take  by mouth.     • Oral Electrolytes (EMERGEN-C ELECTRO MIX PO) Take  by mouth.     • ACYCLOVIR PO Take  by mouth.     • Omega-3 Fatty Acids (FISH OIL) 1000 MG Cap capsule Take 1,000 mg by mouth 3 times a day, with meals.     • cyanocobalamin (VITAMIN B-12) 500 MCG Tab Take 500 mcg by mouth every day.     • therapeutic multivitamin-minerals (THERAGRAN-M) Tab Take 1 Tab by mouth every day.     • Probiotic Product (PROBIOTIC DAILY PO) Take  by mouth.     • tramadol (ULTRAM) 50 MG Tab Take 50 mg by mouth every four hours as needed.       No current facility-administered medications for this visit.        She  has a past medical history of Allergy; Anemia; Anxiety; Cataract; Chronic back pain; Depression (2009); Diverticulitis; Glaucoma; Head injury, closed (3/6/16); HSV infection; IBD (inflammatory bowel disease); Migraine; Pelvic fracture (HCC) (1976); Spondylisthesis; Thyroid disease; and Vaginal vault prolapse (2006).    Patient Active Problem  List    Diagnosis Date Noted   • Acute buttock pain 07/26/2018   • Pain of cervical spine 06/26/2018   • Acquired hypothyroidism 06/26/2018   • Chronic diarrhea of unknown origin 06/07/2018   • Hematuria 04/11/2018   • Pain of right upper extremity 03/27/2018   • Chronic pain syndrome 03/27/2018   • Abnormal CT of the abdomen 03/27/2018   • Chronic headaches 10/05/2017   • Chronic non-specific white matter lesions on MRI 10/05/2017   • Chronic left-sided low back pain with sciatica 07/25/2017   • Seasonal allergic rhinitis due to pollen 05/26/2017   • B12 deficiency 12/20/2016   • Spondylisthesis    • Acquired cyst of kidney 03/23/2016   • Lymphocytic thyroiditis 03/23/2016   • Herpes simplex virus (HSV) infection 11/07/2012   • Other hyperlipidemia 11/07/2012   • Chronic urticaria 11/07/2012   • Osteopenia 11/06/2012       ROS   No fever or chills.  No nausea or vomiting.  No chest pain or palpitations.  No cough or SOB.  No pain with urination or hematuria.  No black or bloody stools.       Objective:     Blood pressure 116/60, pulse 88, temperature 35.9 °C (96.7 °F), resp. rate 20, height 1.524 m (5'), weight 55.3 kg (122 lb), SpO2 96 %. Body mass index is 23.83 kg/m².   Physical Exam:  Well developed, well nourished.  Alert, oriented in no acute distress.  Eye contact is good, speech goal directed, affect calm  Eyes: conjunctiva non-injected, sclera non-icteric.  Lungs: clear to auscultation bilaterally with good excursion. No wheezes or rhonchi  CV: regular rate and rhythm. No murmur  SPINE: No significant spinal curvature on forward bend. Mild tenderness in paraspinous muscles at SI joint without current spasm.  Slight tenderness to palpation of the coccyx but no deformity noted SLT negative. DTR 2+ patella, 1+ achilles bilaterally. Strength 5/5 proximal and distal LE.  No pain with stress of SI. Full hip ROM. Poor hamstring flexibility. No symptoms with axial loading.         Assessment and Plan:   The  following treatment plan was discussed    1. Acute buttock pain  X-ray to assess.  Ice to area.  Await x-ray results.  - DX-SACRUM AND COCCYX 2+; Future  - DX-PELVIS-1 OR 2 VIEWS; Future    Any change or worsening of signs or symptoms, patient encouraged to follow-up or report to the emergency room for further evaluation. Patient understands and agrees.    Followup: Return if symptoms worsen or fail to improve.

## 2018-07-30 ENCOUNTER — HOSPITAL ENCOUNTER (OUTPATIENT)
Dept: RADIOLOGY | Facility: MEDICAL CENTER | Age: 73
End: 2018-07-30
Attending: FAMILY MEDICINE
Payer: MEDICARE

## 2018-07-30 DIAGNOSIS — M79.18 ACUTE BUTTOCK PAIN: ICD-10-CM

## 2018-07-30 PROCEDURE — 72170 X-RAY EXAM OF PELVIS: CPT

## 2018-07-30 PROCEDURE — 72220 X-RAY EXAM SACRUM TAILBONE: CPT

## 2018-08-16 ENCOUNTER — HOSPITAL ENCOUNTER (OUTPATIENT)
Dept: LAB | Facility: MEDICAL CENTER | Age: 73
End: 2018-08-16
Attending: PHYSICIAN ASSISTANT
Payer: MEDICARE

## 2018-08-16 PROCEDURE — 36415 COLL VENOUS BLD VENIPUNCTURE: CPT

## 2018-08-16 PROCEDURE — 84443 ASSAY THYROID STIM HORMONE: CPT

## 2018-08-16 PROCEDURE — 84439 ASSAY OF FREE THYROXINE: CPT

## 2018-08-17 LAB
T4 FREE SERPL-MCNC: 1.04 NG/DL (ref 0.53–1.43)
TSH SERPL DL<=0.005 MIU/L-ACNC: 0.2 UIU/ML (ref 0.38–5.33)

## 2018-08-20 ENCOUNTER — OFFICE VISIT (OUTPATIENT)
Dept: NEUROLOGY | Facility: MEDICAL CENTER | Age: 73
End: 2018-08-20
Payer: MEDICARE

## 2018-08-20 VITALS
OXYGEN SATURATION: 96 % | HEART RATE: 68 BPM | HEIGHT: 60 IN | BODY MASS INDEX: 23.56 KG/M2 | DIASTOLIC BLOOD PRESSURE: 64 MMHG | TEMPERATURE: 98.4 F | SYSTOLIC BLOOD PRESSURE: 114 MMHG | WEIGHT: 120 LBS

## 2018-08-20 DIAGNOSIS — G89.29 CHRONIC NONINTRACTABLE HEADACHE, UNSPECIFIED HEADACHE TYPE: ICD-10-CM

## 2018-08-20 DIAGNOSIS — R90.82 WHITE MATTER ABNORMALITY ON MRI OF BRAIN: ICD-10-CM

## 2018-08-20 DIAGNOSIS — R51.9 CHRONIC NONINTRACTABLE HEADACHE, UNSPECIFIED HEADACHE TYPE: ICD-10-CM

## 2018-08-20 PROCEDURE — 99214 OFFICE O/P EST MOD 30 MIN: CPT | Performed by: PSYCHIATRY & NEUROLOGY
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
## 2018-08-20 NOTE — ASSESSMENT & PLAN NOTE
Pt states that her headaches have been better and she feels like they have gone away for the most part.

## 2018-09-14 ENCOUNTER — TELEPHONE (OUTPATIENT)
Dept: MEDICAL GROUP | Facility: MEDICAL CENTER | Age: 73
End: 2018-09-14

## 2018-09-14 NOTE — TELEPHONE ENCOUNTER
ESTABLISHED PATIENT PRE-VISIT PLANNING     Note: Patient will not be contacted if there is no indication to call.     1.  Reviewed notes from the last few office visits within the medical group: Yes 7/26/18, 6/26/18    2.  If any orders were placed at last visit or intended to be done for this visit (i.e. 6 mos follow-up), do we have Results/Consult Notes?        •  Labs - Labs were not ordered at last office visit.   Note: If patient appointment is for lab review and patient did not complete labs, check with provider if OK to reschedule patient until labs completed.       •  Imaging - Imaging ordered, completed and results are in chart.       •  Referrals - No referrals were ordered at last office visit.    3. Is this appointment scheduled as a Hospital Follow-Up? No    4.  Immunizations were updated in [email or web address] using [employer]?: Yes       •  Web [employer] Recommendations: FLU, PREVNAR (PCV13) , TD and ZOSTAVAX (Shingles)    5.  Patient is due for the following Health Maintenance Topics:   Health Maintenance Due   Topic Date Due   • IMM ZOSTER VACCINES (1 of 2) 06/23/1996   • IMM PNEUMOCOCCAL 65+ (ADULT) LOW/MEDIUM RISK SERIES (1 of 2 - PCV13) 06/23/2011   • IMM INFLUENZA (1) 09/01/2018     6.  MDX printed for Provider? NO    7.  Patient was NOT informed to arrive 15 min prior to their scheduled appointment and bring in their medication bottles.

## 2018-09-17 ENCOUNTER — OFFICE VISIT (OUTPATIENT)
Dept: MEDICAL GROUP | Facility: MEDICAL CENTER | Age: 73
End: 2018-09-17
Payer: MEDICARE

## 2018-09-17 VITALS
BODY MASS INDEX: 24.03 KG/M2 | HEART RATE: 72 BPM | HEIGHT: 60 IN | DIASTOLIC BLOOD PRESSURE: 64 MMHG | WEIGHT: 122.4 LBS | SYSTOLIC BLOOD PRESSURE: 112 MMHG | TEMPERATURE: 98.8 F | OXYGEN SATURATION: 98 %

## 2018-09-17 DIAGNOSIS — L50.9 HIVES: ICD-10-CM

## 2018-09-17 DIAGNOSIS — M25.552 LEFT HIP PAIN: ICD-10-CM

## 2018-09-17 DIAGNOSIS — M54.40 ACUTE LEFT-SIDED LOW BACK PAIN WITH SCIATICA, SCIATICA LATERALITY UNSPECIFIED: ICD-10-CM
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
## 2018-09-17 PROCEDURE — 99214 OFFICE O/P EST MOD 30 MIN: CPT | Performed by: NURSE PRACTITIONER

## 2018-09-17 RX ORDER — HYDROXYZINE HYDROCHLORIDE 25 MG/1
25 TABLET, FILM COATED ORAL
Qty: 90 TAB | Refills: 0 | Status: SHIPPED | OUTPATIENT
Start: 2018-09-17 | End: 2019-04-17 | Stop reason: SDUPTHER

## 2018-09-17 NOTE — PROGRESS NOTES
Subjective:     Joie Hart is a 73 y.o. female who presents with lower back and sacral pain.    HPI:     Seen in f/u for lower back and sacral pain.  She had a fall in july.  She continues to have pain.  She is seeing PT for something else.   She is having and tenderness in lower back and left hip.  Has had tingling and sharp pain in left and left hip.  The sharp pain is very rare.  Pain is worse iwth sitting. No numbness.  xrays on hip and coccyx were wn l.  She is seeing PT FOR another issues.  PT was palp in left hip and it was tender.  She is taking tyl. Using ultram rarely.  Cannot pavel nsaids d/t stomach upset. She has had injections in other areas in past that helped.   She needs refills on pain meds. She is also using elavil 150 mg at hs.  That is also helping her pain.     Patient Active Problem List    Diagnosis Date Noted   • Acute buttock pain 07/26/2018   • Pain of cervical spine 06/26/2018   • Acquired hypothyroidism 06/26/2018   • Chronic diarrhea of unknown origin 06/07/2018   • Hematuria 04/11/2018   • Pain of right upper extremity 03/27/2018   • Chronic pain syndrome 03/27/2018   • Abnormal CT of the abdomen 03/27/2018   • White matter abnormality on MRI of brain 01/09/2018   • Chronic headaches 10/05/2017   • Chronic non-specific white matter lesions on MRI 10/05/2017   • Chronic left-sided low back pain with sciatica 07/25/2017   • Seasonal allergic rhinitis due to pollen 05/26/2017   • B12 deficiency 12/20/2016   • Spondylisthesis    • Acquired cyst of kidney 03/23/2016   • Lymphocytic thyroiditis 03/23/2016   • Herpes simplex virus (HSV) infection 11/07/2012   • Other hyperlipidemia 11/07/2012   • Chronic urticaria 11/07/2012   • Osteopenia 11/06/2012       Current medicines (including changes today)  Current Outpatient Prescriptions   Medication Sig Dispense Refill   • hydrOXYzine HCl (ATARAX) 25 MG Tab Take 1 Tab by mouth every 24 hours as needed for Itching. 90 Tab 0   •  levothyroxine (SYNTHROID) 88 MCG Tab Take 1 Tab by mouth Every morning on an empty stomach. 30 Tab 0   • tramadol (ULTRAM) 50 MG Tab Take 50 mg by mouth every four hours as needed.     • amitriptyline (ELAVIL) 150 MG Tab TAKE 1 TABLET BY MOUTH AT BEDTIME AS NEEDED 90 Tab 3   • Magnesium 100 MG Cap Take  by mouth.     • Oral Electrolytes (EMERGEN-C ELECTRO MIX PO) Take  by mouth.     • ACYCLOVIR PO Take  by mouth.     • Omega-3 Fatty Acids (FISH OIL) 1000 MG Cap capsule Take 1,000 mg by mouth 3 times a day, with meals.     • cyanocobalamin (VITAMIN B-12) 500 MCG Tab Take 500 mcg by mouth every day.     • therapeutic multivitamin-minerals (THERAGRAN-M) Tab Take 1 Tab by mouth every day.     • Probiotic Product (PROBIOTIC DAILY PO) Take  by mouth.       No current facility-administered medications for this visit.        Allergies   Allergen Reactions   • Codeine Rash     Rash all over           ROS  Constitutional: Negative. Negative for fever, chills, weight loss, malaise/fatigue and diaphoresis.   HENT: Negative. Negative for hearing loss, ear pain, nosebleeds, congestion, sore throat, neck pain, tinnitus and ear discharge.   Respiratory: Negative. Negative for cough, hemoptysis, sputum production, shortness of breath, wheezing and stridor.   Cardiovascular: Negative. Negative for chest pain, palpitations, orthopnea, claudication, leg swelling and PND.   Gastrointestinal: Denies nausea, vomiting, diarrhea, constipation, heartburn, melena or hematochezia.  Genitourinary: Denies dysuria, hematuria, urinary incontinence, frequency or urgency.        Objective:     Blood pressure 112/64, pulse 72, temperature 37.1 °C (98.8 °F), height 1.524 m (5'), weight 55.5 kg (122 lb 6.4 oz), SpO2 98 %. Body mass index is 23.9 kg/m².    Physical Exam:  Vitals reviewed.  Constitutional: Oriented to person, place, and time. appears well-developed and well-nourished. No distress.   Cardiovascular: Normal rate, regular rhythm, normal  heart sounds and intact distal pulses. Exam reveals no gallop and no friction rub. No murmur heard. No carotid bruits.   Pulmonary/Chest: Effort normal and breath sounds normal. No stridor. No respiratory distress. no wheezes or rales. exhibits no tenderness.   Musculoskeletal: Normal range of motion. exhibits no edema. mariela pedal pulses 2+.  Neg mariela SLP.  Pain with palp left sacral and buttock.    Neurological: Alert and oriented to person, place, and time. exhibits normal muscle tone.  Skin: Skin is warm and dry. No diaphoresis.   Psychiatric: Normal mood and affect. Behavior is normal.      Assessment and Plan:     The following treatment plan was discussed:    1. Acute left-sided low back pain with sciatica, sciatica laterality unspecified  REFERRAL TO PHYSICAL THERAPY Reason for Therapy: Eval/Treat/Report    refer PT.  if not improved consider further f/u   2. Left hip pain  REFERRAL TO PHYSICAL THERAPY Reason for Therapy: Eval/Treat/Report   3. Fall, initial encounter  REFERRAL TO PHYSICAL THERAPY Reason for Therapy: Eval/Treat/Report    refilled atarax   4. Hives  hydrOXYzine HCl (ATARAX) 25 MG Tab    chronic and stable on atarat         Followup: Return if symptoms worsen or fail to improve.

## 2018-10-02 ENCOUNTER — PHYSICAL THERAPY (OUTPATIENT)
Dept: PHYSICAL THERAPY | Facility: MEDICAL CENTER | Age: 73
End: 2018-10-02
Attending: NURSE PRACTITIONER
Payer: MEDICARE

## 2018-10-02 DIAGNOSIS — M25.552 LEFT HIP PAIN: ICD-10-CM
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
## 2018-10-02 DIAGNOSIS — M54.40 ACUTE LEFT-SIDED LOW BACK PAIN WITH SCIATICA, SCIATICA LATERALITY UNSPECIFIED: ICD-10-CM

## 2018-10-02 PROCEDURE — G8991 OTHER PT/OT GOAL STATUS: HCPCS | Mod: CI,KX

## 2018-10-02 PROCEDURE — G8990 OTHER PT/OT CURRENT STATUS: HCPCS | Mod: CK,KX

## 2018-10-02 PROCEDURE — 97014 ELECTRIC STIMULATION THERAPY: CPT | Mod: KX

## 2018-10-02 PROCEDURE — 97162 PT EVAL MOD COMPLEX 30 MIN: CPT | Mod: KX

## 2018-10-02 ASSESSMENT — ENCOUNTER SYMPTOMS
PAIN SCALE AT LOWEST: 6
PAIN SCALE AT HIGHEST: 9
PAIN SCALE: 8
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
## 2018-10-09 ENCOUNTER — PHYSICAL THERAPY (OUTPATIENT)
Dept: PHYSICAL THERAPY | Facility: MEDICAL CENTER | Age: 73
End: 2018-10-09
Attending: NURSE PRACTITIONER
Payer: MEDICARE

## 2018-10-09 DIAGNOSIS — M54.40 ACUTE LEFT-SIDED LOW BACK PAIN WITH SCIATICA, SCIATICA LATERALITY UNSPECIFIED: ICD-10-CM

## 2018-10-09 DIAGNOSIS — M25.552 LEFT HIP PAIN: ICD-10-CM

## 2018-10-09 PROCEDURE — 97110 THERAPEUTIC EXERCISES: CPT | Mod: KX

## 2018-10-09 PROCEDURE — 97014 ELECTRIC STIMULATION THERAPY: CPT | Mod: KX

## 2018-10-09 NOTE — OP THERAPY DAILY TREATMENT
"  Outpatient Physical Therapy  DAILY TREATMENT     [facility]  [address]  [place] [address]  Phone:  [phone]  Fax:  [phone]    Date: 10/09/2018    Patient: Joie Hart  YOB: 1945  MRN: [id number]     Time Calculation  Start time: 0900  Stop time: 0945 Time Calculation (min): 45 minutes     Chief Complaint: Low Back Pain    Visit #: 2    SUBJECTIVE:  Pt reports that she was very sore following her initial evaluation and then she had a massage that also increased her symptoms. She has been icing off/on for >4 hours each day.     OBJECTIVE:  Current objective measures:   L ASIS elevated, L medial malleolus \"shorter\"          Therapeutic Exercises (CPT 62251):     1. Nu-step , L3 x5 minutes    2. Demo of massage ball for piriformis release, x5 minutes    3. Review of all HEP stretches:, Piriformis stretch, push and pull with foot on the ground, SKTC, Sciatic nerve glides    6. Added to HEP and performed the below exercises:      Therapeutic Exercise Summary: Access Code: [email or web address]   URL: [email or web address]   Date: 10/09/2018   Prepared by: Julita Briones      Exercises  · Supine Single Bent Knee Fallout - 5 sets - 20\" hold - 1x daily - 7x weekly  · Supine Lumbar Rotation Stretch - 5 sets - 20\" hold - 1x daily - 7x weekly  · Supine Hip Hike - 10 reps - 3 sets - 1x daily - 7x weekly        Therapeutic Treatments and Modalities:     1. Manual Therapy (CPT 57611), Hip distraction mobs with belt, lateral/inferior x3 minutes, LAD to L LE x2 minutes, x5 minutes    2. E Stim Unattended (CPT 99897), IFC+MHP to bilateral SI in supine with LE up on bolster, x15 minutes    Time-based treatments/modalities:  Manual therapy minutes (CPT 61110): 5 minutes  Therapeutic exercise minutes (CPT 28878): 25 minutes       Pain rating before treatment: 5  Pain rating after treatment: 3    ASSESSMENT:   Response to treatment: Pt continues " to demo pelvic obliquity and tightness in bilateral hips (L>R). Pt will continue to benefit from training in hip mobility/symmetry followed by a progression of core/pelvic girdle strengthening to restore proper mobility/function.     PLAN/RECOMMENDATIONS:   Plan for treatment: therapy treatment to continue next visit.  Planned interventions for next visit: continue with current treatment; reassess understanding of current HEP, address pelvic obliquity.

## 2018-10-16 ENCOUNTER — PHYSICAL THERAPY (OUTPATIENT)
Dept: PHYSICAL THERAPY | Facility: MEDICAL CENTER | Age: 73
End: 2018-10-16
Attending: NURSE PRACTITIONER
Payer: MEDICARE

## 2018-10-16 DIAGNOSIS — M54.40 ACUTE LEFT-SIDED LOW BACK PAIN WITH SCIATICA, SCIATICA LATERALITY UNSPECIFIED: ICD-10-CM

## 2018-10-16 DIAGNOSIS — M25.552 LEFT HIP PAIN: ICD-10-CM

## 2018-10-16 PROCEDURE — 97110 THERAPEUTIC EXERCISES: CPT | Mod: KX

## 2018-10-16 PROCEDURE — 97014 ELECTRIC STIMULATION THERAPY: CPT | Mod: KX

## 2018-10-16 PROCEDURE — 97140 MANUAL THERAPY 1/> REGIONS: CPT | Mod: KX

## 2018-10-16 NOTE — OP THERAPY DAILY TREATMENT
"  Outpatient Physical Therapy  DAILY TREATMENT     [facility] Outpatient Physical Therapy  [address]  [place] [address]  Phone:  [phone]  Fax:  [phone]    Date: 10/16/2018    Patient: Joie Hart  YOB: 1945  MRN: [id number]     Time Calculation  Start time: 0830  Stop time: 0920 Time Calculation (min): 50 minutes     Chief Complaint: Hip Pain and Low Back Pain    Visit #: 3    SUBJECTIVE:  Pt reports relief of her symptoms with the current exercises, but still having soreness and difficulty sitting prior to performing them.     OBJECTIVE:  Current objective measures:   L ASIS elevated, L medial malleolus \"shorter\"          Therapeutic Exercises (CPT 19540):     1. Nu-step , L3 x5 minutes    2. Leg Press, 35# DL x15 in neutral and x15 with adductor squeeze    3. Review of all HEP stretches:, Piriformis stretch, push and pull with foot on the ground, SKTC, Sciatic nerve glides    6. Added and performed the below exercises:      Therapeutic Exercise Summary: Access Code: [email or web address]   URL: [email or web address]   Date: 10/16/2018   Prepared by: Julita Briones      Exercises  · Standing Repeated Hip Abduction with Resistance - 10 reps - 3 sets - 1x daily - 7x weekly  · Standing Repeated Hip Extension with Resistance - 10 reps - 3 sets - 1x daily - 7x weekly        Therapeutic Treatments and Modalities:     1. Manual Therapy (CPT 20349), Trigger point release left glute; lateral/inferior x3 minutes, LAD to L LE x2 minutes, x5 minutes    2. E Stim Unattended (CPT 65415), IFC+MHP to left glute in prone, x15 minutes    Time-based treatments/modalities:  Manual therapy minutes (CPT 82971): 20 minutes  Therapeutic exercise minutes (CPT 83007): 15 minutes       Pain rating before treatment: 5  Pain rating after treatment: 3    ASSESSMENT:   Response to treatment: Pt continues to demo pelvic obliquity and tightness in bilateral hips (L>R); however no pain " with additional exercises. Multiple trigger points and tightness present in left glutes. Pt will continue to benefit from skilled PT services to improve pelvic symmetry and reduce pain with all ADL/IADL.    PLAN/RECOMMENDATIONS:   Plan for treatment: therapy treatment to continue next visit.  Planned interventions for next visit: continue with current treatment; reassess understanding of current HEP, address pelvic obliquity.

## 2018-10-17 ENCOUNTER — HOSPITAL ENCOUNTER (OUTPATIENT)
Dept: LAB | Facility: MEDICAL CENTER | Age: 73
End: 2018-10-17
Attending: PHYSICIAN ASSISTANT
Payer: MEDICARE

## 2018-10-17 LAB
T4 FREE SERPL-MCNC: 0.91 NG/DL (ref 0.53–1.43)
TSH SERPL DL<=0.005 MIU/L-ACNC: 1.28 UIU/ML (ref 0.38–5.33)

## 2018-10-17 PROCEDURE — 84443 ASSAY THYROID STIM HORMONE: CPT

## 2018-10-17 PROCEDURE — 36415 COLL VENOUS BLD VENIPUNCTURE: CPT

## 2018-10-17 PROCEDURE — 84439 ASSAY OF FREE THYROXINE: CPT

## 2018-10-23 ENCOUNTER — PHYSICAL THERAPY (OUTPATIENT)
Dept: PHYSICAL THERAPY | Facility: MEDICAL CENTER | Age: 73
End: 2018-10-23
Attending: NURSE PRACTITIONER
Payer: MEDICARE

## 2018-10-23 DIAGNOSIS — M25.552 LEFT HIP PAIN: ICD-10-CM

## 2018-10-23 DIAGNOSIS — M54.40 ACUTE LEFT-SIDED LOW BACK PAIN WITH SCIATICA, SCIATICA LATERALITY UNSPECIFIED: ICD-10-CM

## 2018-10-23 PROCEDURE — 97014 ELECTRIC STIMULATION THERAPY: CPT | Mod: KX

## 2018-10-23 PROCEDURE — 97140 MANUAL THERAPY 1/> REGIONS: CPT | Mod: KX

## 2018-10-23 PROCEDURE — 97110 THERAPEUTIC EXERCISES: CPT | Mod: KX

## 2018-10-30 ENCOUNTER — PHYSICAL THERAPY (OUTPATIENT)
Dept: PHYSICAL THERAPY | Facility: MEDICAL CENTER | Age: 73
End: 2018-10-30
Attending: NURSE PRACTITIONER
Payer: MEDICARE

## 2018-10-30 DIAGNOSIS — M25.552 LEFT HIP PAIN: ICD-10-CM

## 2018-10-30 DIAGNOSIS — M54.40 ACUTE LEFT-SIDED LOW BACK PAIN WITH SCIATICA, SCIATICA LATERALITY UNSPECIFIED: ICD-10-CM

## 2018-10-30 PROCEDURE — 97014 ELECTRIC STIMULATION THERAPY: CPT | Mod: KX

## 2018-10-30 PROCEDURE — 97110 THERAPEUTIC EXERCISES: CPT | Mod: KX

## 2018-10-30 NOTE — OP THERAPY DAILY TREATMENT
"  Outpatient Physical Therapy  DAILY TREATMENT     [facility] Outpatient Physical Therapy  [address]  [place] [address]  Phone:  [phone]  Fax:  [phone]    Date: 10/30/2018    Patient: Joie Hart  YOB: 1945  MRN: [id number]     Time Calculation  Start time: 0905  Stop time: 0950 Time Calculation (min): 45 minutes     Chief Complaint: Low Back Pain and Hip Pain    Visit #: 5    SUBJECTIVE:  Pt continues to report low back pain that is slowly improving.     OBJECTIVE:  Current objective measures:   L ASIS elevated, L medial malleolus \"shorter\"          Therapeutic Exercises (CPT 77030):     1. Nu-step , L3 x5 minutes    2. Leg Press, 35# DL x15 in neutral/abduction with L2 TB and adduction with ball    3. R hip hike in supine, x10 , no change to pelvic asymmetry    4. Break the stick, R HS/L hip flexion 3x10\" hold, improved pelvic positioning    5. Pelvic tilts, lateral, anterior, posterior separately and then clockwise x20, stiffness noted with attempts to anterior tilt    Therapeutic Treatments and Modalities:     1. Manual Therapy (CPT 93244), GRIII PA mobs L3-5, transverse processs inferior glides GRIII as well; IASTM lumbar/gluteal region with peanut massage ball; LAD to L LE x2 minutes, x5 minutes    2. E Stim Unattended (CPT 36262), IFC+MHP to bilateral lumbosacral junction in prone, x15 minutes    Time-based treatments/modalities:  Manual therapy minutes (CPT 08833): 5 minutes  Therapeutic exercise minutes (CPT 41067): 25 minutes       Pain rating before treatment: 4  Pain rating after treatment: 2    ASSESSMENT:   Response to treatment: Pt demos improved pelvic symmetry today; continues to respond well to METs. Pt has decreased posterior chain/core strength as well and will continue to benefit from skilled PT to address these limitations and promote increased tolerance in all ADL/IADL.    PLAN/RECOMMENDATIONS:   Plan for treatment: therapy " treatment to continue next visit.  Planned interventions for next visit: continue with current treatment; review/update HEP.

## 2018-11-06 ENCOUNTER — PHYSICAL THERAPY (OUTPATIENT)
Dept: PHYSICAL THERAPY | Facility: MEDICAL CENTER | Age: 73
End: 2018-11-06
Attending: NURSE PRACTITIONER
Payer: MEDICARE

## 2018-11-06 DIAGNOSIS — M25.552 LEFT HIP PAIN: ICD-10-CM

## 2018-11-06 DIAGNOSIS — M54.40 ACUTE LEFT-SIDED LOW BACK PAIN WITH SCIATICA, SCIATICA LATERALITY UNSPECIFIED: ICD-10-CM

## 2018-11-06 PROCEDURE — 97014 ELECTRIC STIMULATION THERAPY: CPT | Mod: KX

## 2018-11-06 PROCEDURE — 97110 THERAPEUTIC EXERCISES: CPT | Mod: KX

## 2018-11-06 NOTE — OP THERAPY DAILY TREATMENT
"  Outpatient Physical Therapy  DAILY TREATMENT     [facility] Outpatient Physical Therapy  [address]  [place] [address]  Phone:  [phone]  Fax:  [phone]    Date: 11/06/2018    Patient: Joie Hart  YOB: 1945  MRN: [id number]     Time Calculation  Start time: 0930  Stop time: 1015 Time Calculation (min): 45 minutes     Chief Complaint: Hip Pain and Low Back Pain    Visit #: 6    SUBJECTIVE:  \"Today is a bad day.\" Pt reports that she is and has been consistent with her exercises at home.     OBJECTIVE:  Current objective measures:   L ASIS elevated, L medial malleolus \"shorter\"          Therapeutic Exercises (CPT 96285):     1. Nu-step , L3 x5 minutes    2. Leg Press, 45# DL x15 in neutral/abduction with L2 TB and adduction with ball    3. Calf stretch on block, 3x30\"    4. Quad stretch, standing with foot on table    5. Pelvic tilts, lateral, anterior, posterior separately and then clockwise x20, stiffness noted with attempts to anterior tilt, Also performed on swiss ball      Therapeutic Exercise Summary: Access Code: [email or web address]   URL: [email or web address]   Date: 11/06/2018   Prepared by: Julita Briones      Exercises  · Pelvic Tilt on Swiss Ball - 10 reps - 3 sets - 1x daily - 7x weekly  · Seated Swiss Ball Lateral Pelvic Tilts - 10 reps - 3 sets - 1x daily - 7x weekly        Therapeutic Treatments and Modalities:     1. Manual Therapy (CPT 01373), GRIII PA mobs L3-5, transverse processs inferior glides GRIII as well; IASTM lumbar/gluteal region with peanut massage ball; LAD to L LE x2 minutes, x5 minutes    2. E Stim Unattended (CPT 76902), IFC+MHP to bilateral lumbosacral junction in prone, x15 minutes    Time-based treatments/modalities:  Manual therapy minutes (CPT 23935): 5 minutes  Therapeutic exercise minutes (CPT 92529): 25 minutes       Pain rating before treatment: 4  Pain rating after treatment: 2    ASSESSMENT:   Response to " treatment: Pt continues to demo decreased balance of strength/stiffness in her pelvis. Will continue to benefit from further training of exercises to address this.     PLAN/RECOMMENDATIONS:   Plan for treatment: therapy treatment to continue next visit.  Planned interventions for next visit: continue with current treatment; review/update HEP.

## 2018-11-13 ENCOUNTER — APPOINTMENT (OUTPATIENT)
Dept: PHYSICAL THERAPY | Facility: MEDICAL CENTER | Age: 73
End: 2018-11-13
Attending: NURSE PRACTITIONER
Payer: MEDICARE

## 2018-11-20 ENCOUNTER — PHYSICAL THERAPY (OUTPATIENT)
Dept: PHYSICAL THERAPY | Facility: MEDICAL CENTER | Age: 73
End: 2018-11-20
Attending: NURSE PRACTITIONER
Payer: MEDICARE

## 2018-11-20 DIAGNOSIS — M25.552 LEFT HIP PAIN: ICD-10-CM

## 2018-11-20 DIAGNOSIS — M54.40 ACUTE LEFT-SIDED LOW BACK PAIN WITH SCIATICA, SCIATICA LATERALITY UNSPECIFIED: ICD-10-CM

## 2018-11-20 PROCEDURE — 97014 ELECTRIC STIMULATION THERAPY: CPT | Mod: KX

## 2018-11-20 PROCEDURE — 97110 THERAPEUTIC EXERCISES: CPT | Mod: KX

## 2018-11-20 NOTE — OP THERAPY DAILY TREATMENT
"  Outpatient Physical Therapy  DAILY TREATMENT     [facility] Outpatient Physical Therapy  [address]  [place] [address]  Phone:  [phone]  Fax:  [phone]    Date: 11/20/2018    Patient: Joie Hart  YOB: 1945  MRN: [id number]     Time Calculation  Start time: 1100  Stop time: 1145 Time Calculation (min): 45 minutes     Chief Complaint: Hip Pain and Back Pain    Visit #: 7    SUBJECTIVE:  Pt and therapist discussed referral back to her MD since her pain remains relatively constant.    OBJECTIVE:  Current objective measures:   L ASIS elevated, L medial malleolus \"shorter\"          Therapeutic Exercises (CPT 30937):     1. Nu-step , L3 x5 minutes    2. Leg Press, 45# DL x15 in neutral/abduction with L2 TB and adduction with ball    3. Calf stretch on block, 3x30\"    4. Quad stretch, standing with foot on table      Therapeutic Exercise Summary: Access Code: [email or web address]   URL: [email or web address]   Date: 11/20/2018   Prepared by: Julita Birones      Exercises  · Supine Bridge with Mini Swiss Ball Between Knees - 10 reps - 3 sets - 10\" hold - 1x daily - 7x weekly  · Butterfly Bridge - 5 reps - 3 sets - 1x daily - 7x weekly  · Bridge with Straight Leg Raise - 10 reps - 3 sets - 1x daily - 7x weekly  · Hooklying Lumbar Traction - 10 reps - 3 sets - 1x daily - 7x weekly        Therapeutic Treatments and Modalities:     1. Manual Therapy (CPT 58736), GRIII PA mobs L3-5, transverse processs inferior glides GRIII as well; IASTM lumbar/gluteal region with peanut massage ball; LAD to L LE x2 minutes, x5 minutes    2. E Stim Unattended (CPT 95954), IFC+MHP to bilateral lumbosacral junction in prone, x15 minutes    Time-based treatments/modalities:  Manual therapy minutes (CPT 77332): 5 minutes  Therapeutic exercise minutes (CPT 31695): 25 minutes       Pain rating before treatment: 4  Pain rating after treatment: 2    ASSESSMENT:   Response to treatment: Pt " was given more exercises for her HEP; will reassess response next session to determine whether pt will be D/C or should continue skilled PT.     PLAN/RECOMMENDATIONS:   Plan for treatment: therapy treatment to continue next visit.  Planned interventions for next visit: continue with current treatment.

## 2018-11-27 ENCOUNTER — PHYSICAL THERAPY (OUTPATIENT)
Dept: PHYSICAL THERAPY | Facility: MEDICAL CENTER | Age: 73
End: 2018-11-27
Attending: NURSE PRACTITIONER
Payer: MEDICARE

## 2018-11-27 DIAGNOSIS — M25.552 LEFT HIP PAIN: ICD-10-CM

## 2018-11-27 DIAGNOSIS — M54.40 ACUTE LEFT-SIDED LOW BACK PAIN WITH SCIATICA, SCIATICA LATERALITY UNSPECIFIED: ICD-10-CM

## 2018-11-27 PROCEDURE — G8991 OTHER PT/OT GOAL STATUS: HCPCS | Mod: CI,KX

## 2018-11-27 PROCEDURE — 97140 MANUAL THERAPY 1/> REGIONS: CPT | Mod: KX

## 2018-11-27 PROCEDURE — 97110 THERAPEUTIC EXERCISES: CPT | Mod: KX

## 2018-11-27 PROCEDURE — G8992 OTHER PT/OT  D/C STATUS: HCPCS | Mod: CJ,KX

## 2018-11-27 PROCEDURE — G8990 OTHER PT/OT CURRENT STATUS: HCPCS | Mod: CJ,KX

## 2018-11-27 PROCEDURE — 97014 ELECTRIC STIMULATION THERAPY: CPT | Mod: KX

## 2018-11-27 NOTE — LETTER
November 27, 2018    No Recipients      Re:  Joie Hart          Dear  No Recipients,    It was a pleasure seeing your patient, Joie Hart, on 11/27/2018 for her final therapy visit.     Please find enclosed a copy of the patient's discharge summary from outpatient physical therapy services.          On behalf of the staff at [facility], we would like to thank you for your referrals.  We appreciate your confidence in our clinic and will continue to work hard to provide the best outcomes for your patients.    We sincerely enjoy treating your patients and hope that you have been happy with their care.  Thank you again, and please call with any questions, concerns or ways that we can best meet your needs.        Sincerely,          Julita Briones, PT, DPT    No Recipients              Outpatient Physical Therapy  DISCHARGE SUMMARY NOTE      [facility] Outpatient Physical Therapy  [address]  [place] [address]  Phone:  [phone]  Fax:  [phone]    Date of Visit: 11/27/2018    Patient: Joie Hart  YOB: 1945  MRN: [id number]     Referring Provider: NATTY Hernandez  [address]  [address]  [place] [address]   Referring Diagnosis Lumbago with sciatica, unspecified side [M54.40];Pain in left hip [M25.552]     Physical Therapy Occurrence Codes    Date of onset of impairment:  7/10/18   Date physical therapy care plan established or reviewed:  10/4/18   Date physical therapy treatment started:  10/4/18          Functional Limitation G-Codes and Severity Modifiers      Goal: Other PT/OT Primary: Goal (): CI   Discharge: Other PT/OT Primary: Discharge (): CJ       Your patient is being discharged from Physical Therapy with the following comments:   · Progress plateau    Comments:  PT reviewed all recommendations for independent HEP and pt demos understanding/agreeable. Pt will be D/C to trial an  independent program with referral back to MD to reassess if further imaging or injections vs other pain management solution may be warranted.     Recommendations:  Please refer this patient back in 1-2 months if symptoms worsen or status changes. Thank you!    Julita Briones, PT, DPT    Date: 11/27/2018

## 2018-11-27 NOTE — OP THERAPY DAILY TREATMENT
"  Outpatient Physical Therapy  DAILY TREATMENT     [facility] Outpatient Physical Therapy  [address]  [place] [address]  Phone:  [phone]  Fax:  [phone]    Date: 11/27/2018    Patient: Joie Hart  YOB: 1945  MRN: [id number]     Time Calculation  Start time: 1530  Stop time: 1615 Time Calculation (min): 45 minutes     Chief Complaint: Back Pain    Visit #: 8    SUBJECTIVE:  Pt continues to have no change or progress in symptoms. Pt agreeable to referral back to MD for further options concerning pain management and or updated imaging.     OBJECTIVE:  Current objective measures:   Mild pelvic obliquity remains: L LE shorter          Therapeutic Exercises (CPT 69456):     1. Nu-step , L3 x5 minutes    2. Leg Press, 45# DL x15 in neutral/abduction with L2 TB and adduction with ball    3. Calf stretch on block, 3x30\"    4. Quad stretch, standing with foot on table      Therapeutic Exercise Summary: Access Code: [email or web address]   URL: [email or web address]   Date: 11/20/2018   Prepared by: Julita Briones      Exercises  · Supine Bridge with Mini Swiss Ball Between Knees - 10 reps - 3 sets - 10\" hold - 1x daily - 7x weekly  · Butterfly Bridge - 5 reps - 3 sets - 1x daily - 7x weekly  · Bridge with Straight Leg Raise - 10 reps - 3 sets - 1x daily - 7x weekly  · Hooklying Lumbar Traction - 10 reps - 3 sets - 1x daily - 7x weekly        Therapeutic Treatments and Modalities:     1. Manual Therapy (CPT 74329), GRIII PA mobs L3-5, transverse processs inferior glides GRIII as well; IASTM lumbar/gluteal region with peanut massage ball; LAD to L LE x2 minutes, x10 minutes    2. E Stim Unattended (CPT 81374), IFC+MHP to bilateral lumbosacral junction in prone, x15 minutes    Time-based treatments/modalities:  Manual therapy minutes (CPT 40961): 10 minutes  Therapeutic exercise minutes (CPT 05864): 20 minutes       Pain rating before treatment: 4  Pain rating after " treatment: 2    ASSESSMENT:   Response to treatment: PT reviewed all recommendations for independent HEP and pt demos understanding/agreeable. Pt will be D/C to trial an independent program with referral back to MD to reassess if further imaging or injections vs other pain management solution may be warranted.     PLAN/RECOMMENDATIONS:   Plan for treatment: refer patient back to MD.  Planned interventions for next visit: Discharge to independent HEP.

## 2018-11-28 NOTE — OP THERAPY DISCHARGE SUMMARY
Outpatient Physical Therapy  DISCHARGE SUMMARY NOTE      [facility] Outpatient Physical Therapy  [address]  [place] [address]  Phone:  [phone]  Fax:  [phone]    Date of Visit: 11/27/2018    Patient: Joie Hart  YOB: 1945  MRN: [id number]     Referring Provider: NATTY Hernandez  [address]  [address]  [place] [address]   Referring Diagnosis Lumbago with sciatica, unspecified side [M54.40];Pain in left hip [M25.552]     Physical Therapy Occurrence Codes    Date of onset of impairment:  7/10/18   Date physical therapy care plan established or reviewed:  10/4/18   Date physical therapy treatment started:  10/4/18          Functional Limitation G-Codes and Severity Modifiers      Goal: Other PT/OT Primary: Goal (): CI   Discharge: Other PT/OT Primary: Discharge (): CJ       Your patient is being discharged from Physical Therapy with the following comments:   · Progress plateau    Comments:  PT reviewed all recommendations for independent HEP and pt demos understanding/agreeable. Pt will be D/C to trial an independent program with referral back to MD to reassess if further imaging or injections vs other pain management solution may be warranted.     Recommendations:  Please refer this patient back in 1-2 months if symptoms worsen or status changes. Thank you!    Julita Briones, PT, DPT    Date: 11/27/2018

## 2018-12-11 RX ORDER — ACYCLOVIR 800 MG/1
800 TABLET ORAL 2 TIMES DAILY
Qty: 180 TAB | Refills: 0 | Status: SHIPPED | OUTPATIENT
Start: 2018-12-11 | End: 2019-10-31

## 2018-12-12 ENCOUNTER — OFFICE VISIT (OUTPATIENT)
Dept: MEDICAL GROUP | Facility: LAB | Age: 73
End: 2018-12-12
Payer: MEDICARE

## 2018-12-12 VITALS
TEMPERATURE: 97.6 F | HEART RATE: 89 BPM | SYSTOLIC BLOOD PRESSURE: 112 MMHG | HEIGHT: 60 IN | DIASTOLIC BLOOD PRESSURE: 78 MMHG | BODY MASS INDEX: 24.66 KG/M2 | WEIGHT: 125.6 LBS | RESPIRATION RATE: 14 BRPM | OXYGEN SATURATION: 99 %

## 2018-12-12 DIAGNOSIS — R07.81 RIB PAIN ON LEFT SIDE: ICD-10-CM

## 2018-12-12 DIAGNOSIS — R51.9 CHRONIC NONINTRACTABLE HEADACHE, UNSPECIFIED HEADACHE TYPE: ICD-10-CM

## 2018-12-12 DIAGNOSIS — G89.29 CHRONIC NONINTRACTABLE HEADACHE, UNSPECIFIED HEADACHE TYPE: ICD-10-CM

## 2018-12-12 PROCEDURE — 99214 OFFICE O/P EST MOD 30 MIN: CPT | Performed by: FAMILY MEDICINE

## 2018-12-12 RX ORDER — TRAMADOL HYDROCHLORIDE 50 MG/1
50 TABLET ORAL EVERY 8 HOURS PRN
Qty: 60 TAB | Refills: 0 | Status: SHIPPED | OUTPATIENT
Start: 2018-12-12 | End: 2019-01-11

## 2018-12-13 NOTE — ASSESSMENT & PLAN NOTE
Pain in the anterior left ribs for the last week.  She denies any trauma to the area.  No cough or SOB.  No swelling of the legs or calf pain.

## 2018-12-18 ENCOUNTER — HOSPITAL ENCOUNTER (EMERGENCY)
Facility: MEDICAL CENTER | Age: 73
End: 2018-12-18
Attending: EMERGENCY MEDICINE
Payer: MEDICARE

## 2018-12-18 ENCOUNTER — TELEPHONE (OUTPATIENT)
Dept: MEDICAL GROUP | Facility: LAB | Age: 73
End: 2018-12-18

## 2018-12-18 ENCOUNTER — APPOINTMENT (OUTPATIENT)
Dept: RADIOLOGY | Facility: MEDICAL CENTER | Age: 73
End: 2018-12-18
Attending: EMERGENCY MEDICINE
Payer: MEDICARE

## 2018-12-18 VITALS
HEART RATE: 91 BPM | HEIGHT: 61 IN | RESPIRATION RATE: 18 BRPM | DIASTOLIC BLOOD PRESSURE: 56 MMHG | WEIGHT: 125.66 LBS | BODY MASS INDEX: 23.73 KG/M2 | TEMPERATURE: 97.6 F | SYSTOLIC BLOOD PRESSURE: 96 MMHG | OXYGEN SATURATION: 94 %

## 2018-12-18 DIAGNOSIS — S09.90XA CLOSED HEAD INJURY, INITIAL ENCOUNTER: ICD-10-CM

## 2018-12-18 DIAGNOSIS — S01.01XA LACERATION OF SCALP, INITIAL ENCOUNTER: ICD-10-CM

## 2018-12-18 DIAGNOSIS — R55 SYNCOPE AND COLLAPSE: ICD-10-CM

## 2018-12-18 LAB
ANION GAP SERPL CALC-SCNC: 9 MMOL/L (ref 0–11.9)
APPEARANCE UR: CLEAR
BACTERIA #/AREA URNS HPF: ABNORMAL /HPF
BASOPHILS # BLD AUTO: 0.9 % (ref 0–1.8)
BASOPHILS # BLD: 0.05 K/UL (ref 0–0.12)
BILIRUB UR QL STRIP.AUTO: NEGATIVE
BUN SERPL-MCNC: 10 MG/DL (ref 8–22)
CALCIUM SERPL-MCNC: 9 MG/DL (ref 8.4–10.2)
CHLORIDE SERPL-SCNC: 104 MMOL/L (ref 96–112)
CO2 SERPL-SCNC: 23 MMOL/L (ref 20–33)
COLOR UR: YELLOW
CREAT SERPL-MCNC: 0.77 MG/DL (ref 0.5–1.4)
EKG IMPRESSION: NORMAL
EOSINOPHIL # BLD AUTO: 0 K/UL (ref 0–0.51)
EOSINOPHIL NFR BLD: 0 % (ref 0–6.9)
EPI CELLS #/AREA URNS HPF: ABNORMAL /HPF
ERYTHROCYTE [DISTWIDTH] IN BLOOD BY AUTOMATED COUNT: 39.2 FL (ref 35.9–50)
GLUCOSE SERPL-MCNC: 129 MG/DL (ref 65–99)
GLUCOSE UR STRIP.AUTO-MCNC: NEGATIVE MG/DL
HCT VFR BLD AUTO: 40.3 % (ref 37–47)
HGB BLD-MCNC: 13.6 G/DL (ref 12–16)
IMM GRANULOCYTES # BLD AUTO: 0.02 K/UL (ref 0–0.11)
IMM GRANULOCYTES NFR BLD AUTO: 0.4 % (ref 0–0.9)
KETONES UR STRIP.AUTO-MCNC: NEGATIVE MG/DL
LEUKOCYTE ESTERASE UR QL STRIP.AUTO: ABNORMAL
LYMPHOCYTES # BLD AUTO: 2.74 K/UL (ref 1–4.8)
LYMPHOCYTES NFR BLD: 49.1 % (ref 22–41)
MCH RBC QN AUTO: 32 PG (ref 27–33)
MCHC RBC AUTO-ENTMCNC: 33.7 G/DL (ref 33.6–35)
MCV RBC AUTO: 94.8 FL (ref 81.4–97.8)
MICRO URNS: ABNORMAL
MONOCYTES # BLD AUTO: 0.46 K/UL (ref 0–0.85)
MONOCYTES NFR BLD AUTO: 8.2 % (ref 0–13.4)
MUCOUS THREADS #/AREA URNS HPF: ABNORMAL /HPF
NEUTROPHILS # BLD AUTO: 2.31 K/UL (ref 2–7.15)
NEUTROPHILS NFR BLD: 41.4 % (ref 44–72)
NITRITE UR QL STRIP.AUTO: NEGATIVE
NRBC # BLD AUTO: 0 K/UL
NRBC BLD-RTO: 0 /100 WBC
PH UR STRIP.AUTO: 6 [PH]
PLATELET # BLD AUTO: 208 K/UL (ref 164–446)
PMV BLD AUTO: 9.4 FL (ref 9–12.9)
POTASSIUM SERPL-SCNC: 3.2 MMOL/L (ref 3.6–5.5)
PROT UR QL STRIP: NEGATIVE MG/DL
RBC # BLD AUTO: 4.25 M/UL (ref 4.2–5.4)
RBC # URNS HPF: ABNORMAL /HPF
RBC UR QL AUTO: NEGATIVE
SODIUM SERPL-SCNC: 136 MMOL/L (ref 135–145)
SP GR UR STRIP.AUTO: <=1.005
WBC # BLD AUTO: 5.6 K/UL (ref 4.8–10.8)
WBC #/AREA URNS HPF: ABNORMAL /HPF

## 2018-12-18 PROCEDURE — 85025 COMPLETE CBC W/AUTO DIFF WBC: CPT

## 2018-12-18 PROCEDURE — 71045 X-RAY EXAM CHEST 1 VIEW: CPT

## 2018-12-18 PROCEDURE — 700101 HCHG RX REV CODE 250: Performed by: EMERGENCY MEDICINE

## 2018-12-18 PROCEDURE — 700105 HCHG RX REV CODE 258: Performed by: EMERGENCY MEDICINE

## 2018-12-18 PROCEDURE — 99284 EMERGENCY DEPT VISIT MOD MDM: CPT

## 2018-12-18 PROCEDURE — 304999 HCHG REPAIR-SIMPLE/INTERMED LEVEL 1

## 2018-12-18 PROCEDURE — 81001 URINALYSIS AUTO W/SCOPE: CPT

## 2018-12-18 PROCEDURE — 70450 CT HEAD/BRAIN W/O DYE: CPT

## 2018-12-18 PROCEDURE — 36415 COLL VENOUS BLD VENIPUNCTURE: CPT

## 2018-12-18 PROCEDURE — 304217 HCHG IRRIGATION SYSTEM

## 2018-12-18 PROCEDURE — 305308 HCHG STAPLER,SKIN,DISP.

## 2018-12-18 PROCEDURE — 80048 BASIC METABOLIC PNL TOTAL CA: CPT

## 2018-12-18 PROCEDURE — 93005 ELECTROCARDIOGRAM TRACING: CPT | Performed by: EMERGENCY MEDICINE

## 2018-12-18 RX ORDER — SODIUM CHLORIDE 9 MG/ML
1000 INJECTION, SOLUTION INTRAVENOUS ONCE
Status: COMPLETED | OUTPATIENT
Start: 2018-12-18 | End: 2018-12-18

## 2018-12-18 RX ORDER — LIDOCAINE HYDROCHLORIDE AND EPINEPHRINE 10; 10 MG/ML; UG/ML
20 INJECTION, SOLUTION INFILTRATION; PERINEURAL ONCE
Status: COMPLETED | OUTPATIENT
Start: 2018-12-18 | End: 2018-12-18

## 2018-12-18 RX ADMIN — SODIUM CHLORIDE 1000 ML: 9 INJECTION, SOLUTION INTRAVENOUS at 01:00

## 2018-12-18 RX ADMIN — LIDOCAINE HYDROCHLORIDE AND EPINEPHRINE 20 ML: 10; 10 INJECTION, SOLUTION INFILTRATION; PERINEURAL at 02:30

## 2018-12-18 ASSESSMENT — PAIN SCALES - GENERAL: PAINLEVEL_OUTOF10: 6

## 2018-12-18 NOTE — ED TRIAGE NOTES
"Chief Complaint   Patient presents with   • T-5000 FALL     pt fell backwards and hit her head tonight while putting ornaments on the [employer] tree, pt denies any LOC or dizziness.      /81   Pulse 94   Temp (!) 35.7 °C (96.3 °F) (Temporal)   Resp 18   Ht 1.549 m (5' 1\")   Wt 57 kg (125 lb 10.6 oz)   SpO2 94%   BMI 23.74 kg/m²     Pt BIB  tonight for a fall, pt fell straight backwards and hit her head on hard wood floor. Pt did states that she was drinking tonight. Pt is not on any blood thinners.  "

## 2018-12-18 NOTE — ED PROVIDER NOTES
ED Provider Note        CHIEF COMPLAINT  Chief Complaint   Patient presents with   • T-5000 FALL     pt fell backwards and hit her head tonight while putting ornaments on the [place] tree, pt denies any LOC or dizziness.        HPI    Joie Hart is a 73 y.o. female presenting with head trauma.  Was decorating [employer] tree and passed out, hit head on ground.   heard a thud, immediately came to see her and found her on the ground, does not feel like she completely lost consciousness.  Patient does not completely remember the event.  She denies any presyncopal chest pain, shortness of breath, headache.  No recent illness, vomiting, diarrhea, blood in stool.  Has been drinking this evening.  She is mourning loss of her brother who  yesterday.  No previous cardiac problems.  Not on anticoagulation.    States pain is now 1/10, sharp, isolated the posterior scalp, no modifying factors.        REVIEW OF SYSTEMS  Positives as above. Pertinent negatives include shortness of breath, chest pain, headache, vision loss, vomiting, diarrhea, blood in stool, melena  All other review of systems are negative        PAST MEDICAL HISTORY   has a past medical history of Allergy; Anemia; Anxiety; Cataract; Chronic back pain; Depression (); Diverticulitis; Glaucoma; Head injury, closed (3/6/16); HSV infection; IBD (inflammatory bowel disease); Migraine; Pelvic fracture (HCC) (); Spondylisthesis; Thyroid disease; and Vaginal vault prolapse ().    SOCIAL HISTORY  Social History     Social History Main Topics   • Smoking status: Former Smoker     Packs/day: 1.00     Years: 25.00     Types: Cigarettes     Quit date: 1980   • Smokeless tobacco: Never Used   • Alcohol use 6.0 oz/week     10 Glasses of wine per week   • Drug use: No   • Sexual activity: Not Currently     Partners: Male       SURGICAL HISTORY   has a past surgical history that includes primary c section (); elbow arthrotomy (Right);  "shoulder arthroscopy (Right, 2006); full thickness skin graft (Right, 1966); and cataract extraction with iol (Bilateral).    CURRENT MEDICATIONS  Home Medications    **Home medications have not yet been reviewed for this encounter**         ALLERGIES  Allergies   Allergen Reactions   • Codeine Rash     Rash all over           PHYSICAL EXAM  VITAL SIGNS: BP (!) 96/56   Pulse 91   Temp 36.4 °C (97.6 °F) (Temporal)   Resp 18   Ht 1.549 m (5' 1\")   Wt 57 kg (125 lb 10.6 oz)   SpO2 94%   BMI 23.74 kg/m²    SpO2: I interpret this pulse oximetry as normal  Constitutional: Alert in no apparent distress.  HENT: Normocephalic, dry MM. 3cm wound to posterior occiput, small surrounding hematoma, hemostatic.  No raccoon eyes, freire signs, signs of depressed skull fracture.  No hemotympanum bilaterally.  No jaw malocclusion or intraoral trauma.  Eyes: PERRL, Conjunctiva normal, Non-icteric.   Neck: Normal range of motion, No tenderness, Supple, No stridor.   Lymphatic: No lymphadenopathy noted.   Cardiovascular: Regular rate and rhythm, no murmurs.   Thorax & Lungs: Normal breath sounds, No respiratory distress, No wheezing, No chest tenderness.   Abdomen: Soft, No tenderness, No pulsatile masses. No peritoneal signs.  Skin: Warm, Dry, No erythema, No rash.   Back: No bony tenderness, No CVA tenderness.   Extremities: Intact distal pulses, No edema, No tenderness, No cyanosis  Musculoskeletal: Good range of motion in all major joints. No tenderness to palpation or major deformities noted.  No C/T/L-spine tenderness or step-offs.   Neurologic: Alert and oriented x3, No focal deficits noted.  GCS 15.  Psychiatric: Affect normal, Judgment normal, Mood normal.     DIAGNOSTIC STUDIES / PROCEDURES      LABORATORY  Labs Reviewed   CBC WITH DIFFERENTIAL - Abnormal; Notable for the following:        Result Value    Neutrophils-Polys 41.40 (*)     Lymphocytes 49.10 (*)     All other components within normal limits   BASIC " METABOLIC PANEL - Abnormal; Notable for the following:     Potassium 3.2 (*)     Glucose 129 (*)     All other components within normal limits   URINALYSIS,CULTURE IF INDICATED - Abnormal; Notable for the following:     Leukocyte Esterase Small (*)     All other components within normal limits   URINE MICROSCOPIC (W/UA) - Abnormal; Notable for the following:     Bacteria Few (*)     All other components within normal limits   ESTIMATED GFR       EKG  EKG (my interpretation): Normal sinus rhythm, rate 92, axis normal, no ST or T-wave abnormalities, isolated Q waves in lead III, normal intervals, no signs of AV RD, long QT, [place], HCM, normal EKG, no prior EKGs available for comparison.  Relevant clinical issues: Syncope      RADIOLOGY    CT-HEAD W/O   Final Result         1.  No acute intracranial abnormality is identified, there are nonspecific white matter changes, commonly associated with small vessel ischemic disease.  Associated mild cerebral atrophy is noted.      DX-CHEST-PORTABLE (1 VIEW)   Final Result         1.  No acute cardiopulmonary disease.          Chest XR, 1 view (my read): No focal infiltrates or large effusion.  Normal mediastinum. No prior films were immediately available for comparison.  Impression: Normal chest x-ray    LACERATION REPAIR    Wound is 3cm in length, located posterior occiput.  The wound was anesthestized with 3 mL 1% lidocaine with epinephrine locally infiltrated.  Excellent anesthesia was obtained and the wound was washed out with 500 mL saline under pressure and was explored to depth.  There is no hemorrhage ongoing, no obvious FB or NV injury.    No significant debridement required.    The wound was then closed primarily in single layer using staple, 3 staples used.    Complicate factors: None.Excellent approximation was achieved and the wound was dressed in a sterile fashion, the patient tolerated it well and there was no complications.  I discussed wound care  "instructions, infection precautions, the potential for radiolucent FB material, and the need for close follow up in event of complicated healing.     T[place]anus updated.    CHART REVIEW  Pertinent medical chart information was reviewed and reveals: No recent pertinent medical encounters, chart states outpatient encounter however no nodes    Medications   NS infusion 1,000 mL (0 mL Intravenous Stopped 18 0245)   lidocaine-epinephrine 1 %-1:[id number] injection 20 mL (20 mL Injection Given by Provider 18 0230)       COURSE & MEDICAL DECISION MAKING  Pertinent Labs & Imaging studies reviewed. (See chart for details)    Differential diagnosis includes, but not limited to:  Vasovagal syncope, orthostatic syncope, intracranial hemorrhage, arrhythmia, laceration, contusion, closed head injury, concussion, anemia, electrolyte abnormality    Vitals:    18 0018 18 0020 18 0159 18 0225   BP:  132/81  (!) 96/56   Pulse:  94 88 91   Resp:  18  18   Temp:  (!) 35.7 °C (96.3 °F)  36.4 °C (97.6 °F)   TempSrc:  Temporal  Temporal   SpO2:  94% 94%    Weight: 57 kg (125 lb 10.6 oz)      Height: 1.549 m (5' 1\")          HYDRATION: Based on the patient's presentation of Dehydration the patient was given IV fluids. IV Hydration was used because oral hydration was not adequate alone. Upon recheck following hydration, the patient was Much improved.  HTN/IDDM FOLLOW UP:  The patient is referred to a primary physician for blood pressure management, diabetic screening, and for all other preventive health concerns      73-year-old female with questionable syncope and head trauma.  States she passed out while decorating her [employer] tree, also endorses alcohol use this evening, feels like she is very dehydrated lately.  Is also mourning the loss of her brother who  recently.   does not feel like she lost consciousness, patient does not believe so either however I would question syncope with lack of " complete memory of the event.  Vital signs and exam here are very reassuring, does have small lack to the posterior occiput, no other evidence of severe trauma.  EKG unremarkable, labs unremarkable.  Chest x-ray normal.  Given fluids for likely dehydration.  Wound irrigated and repaired, see procedure note for details.  Regarding syncope, appears to be orthostatic in nature with dehydration and alcohol use, no intracranial hemorrhage or concerning findings on CT.  No evidence of stroke or focal neurological deficit on exam.  EKG without any signs of significant arrhythmia.  No presyncopal symptoms concerning for pulmonary embolism, dissection, ACS.  No signs or symptoms of GI bleed, no evidence of anemia on lab work, no significant hypotension or other signs of hemodynamic compromise.  [place] syncope rule score of 0, feel this is a low risk of syncope patient does not require admission to the hospital at this time.  Has good follow-up.  Ambulating without any symptoms and normally in the department.  Is going home with her .  Patient or guardian given strict returns precautions and care instructions.  Advised PCP follow-up in 1-2 days.  Patient or guardian expresses understanding and agrees to plan.    DISPOSITION  Discharged home in improved condition      FINAL IMPRESSION  Visit Diagnoses     ICD-10-CM   1. Syncope and collapse R55   2. Closed head injury, initial encounter S09.90XA   3. Laceration of scalp, initial encounter S01.01XA          Electronically signed by: Sinan Simental MD, MOISE 12/18/2018     This dictation has been created using voice recognition software and/or scribes. The accuracy of the dictation is limited by the abilities of the software and the expertise of the scribes. I expect there may be some errors of grammar and possibly content. I made every attempt to manually correct the errors within my dictation. However, errors related to voice recognition software and/or  scribes may still exist and should be interpreted within the appropriate context.

## 2018-12-18 NOTE — ED NOTES
Pt given discharge paperwork and at home care instruction. Pt verbalized understanding of all information given. Pt ambulated out of the ER w/o difficulty w/ spouse.

## 2018-12-18 NOTE — TELEPHONE ENCOUNTER
ESTABLISHED PATIENT PRE-VISIT PLANNING     Patient was NOT contacted to complete PVP.     Note: Patient will not be contacted if there is no indication to call.     1.  Reviewed notes from the last few office visits within the medical group: Yes    2.  If any orders were placed at last visit or intended to be done for this visit (i.e. 6 mos follow-up), do we have Results/Consult Notes?        •  Labs - Labs were not ordered at last office visit.       •  Imaging - Imaging was not ordered at last office visit.       •  Referrals - No referrals were ordered at last office visit.    3. Is this appointment scheduled as a Hospital Follow-Up? No    4.  Immunizations were updated in Epic using WebIZ?: Epic matches WebIZ       •  Web Iz Recommendations: PREVNAR (PCV13)  and SHINGRIX (Shingles)    5.  Patient is due for the following Health Maintenance Topics:   Health Maintenance Due   Topic Date Due   • IMM ZOSTER VACCINES (1 of 2) 06/23/1996   • IMM PNEUMOCOCCAL 65+ (ADULT) LOW/MEDIUM RISK SERIES (1 of 2 - PCV13) 06/23/2011   • IMM INFLUENZA (1) 09/01/2018       - Patient has completed TDAP Immunization(s) per WebIZ. Chart has been updated.    6.  MDX printed for Provider? NO    7.  Patient was NOT informed to arrive 15 min prior to their scheduled appointment and bring in their medication bottles.

## 2018-12-19 ENCOUNTER — OFFICE VISIT (OUTPATIENT)
Dept: MEDICAL GROUP | Facility: LAB | Age: 73
End: 2018-12-19
Payer: MEDICARE

## 2018-12-19 VITALS
HEIGHT: 61 IN | DIASTOLIC BLOOD PRESSURE: 80 MMHG | TEMPERATURE: 97.7 F | HEART RATE: 97 BPM | SYSTOLIC BLOOD PRESSURE: 116 MMHG | OXYGEN SATURATION: 97 % | RESPIRATION RATE: 12 BRPM | BODY MASS INDEX: 23.79 KG/M2 | WEIGHT: 126 LBS

## 2018-12-19 DIAGNOSIS — S01.01XD LACERATION OF SCALP, SUBSEQUENT ENCOUNTER: ICD-10-CM

## 2018-12-19 PROCEDURE — 99213 OFFICE O/P EST LOW 20 MIN: CPT | Performed by: FAMILY MEDICINE
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
## 2018-12-20 NOTE — ASSESSMENT & PLAN NOTE
Chronic pain recheck for: chronic headaches  Last dose of controlled substance: yesterday  Chronic pain treated with tramadol 50 mg taken once a day as needed    She  reports that she drinks about 6.0 oz of alcohol per week .  She  reports that she does not use drugs.    Interval history:   Any major change in health since last appointment? No    Consequences of Chronic Opiate therapy:  (5 A's)  Analgesia: Compared to no treatment or prior treatment, pain is currently significantly improved  Activity: significantly improved  Adverse Events:CAPLEIDY@ denies constipation, dry mouth, itchy skin, nausea and sedation  Aberrant Behaviors: She reports she is taking medication as prescribed and is not veering from agreed treatment regimen or provider recommendations. There have been no inappropriate refills or lost/stolen meds reported.  Affect/Mood: Pain is not impacting patient's mood.  Patient denies depression/anxiety.    Nonnarcotic treatments that are being used: Tylenol.         Opioid Risk Score: 2    Interpretation of Opioid Risk Score   Score 0-3 = Low risk of abuse. Do UDS at least once per year.  Score 4-7 = Moderate risk of abuse. Do UDS 1-4 times per year.  Score 8+ = High risk of abuse. Refer to specialist.    Last order of CONTROLLED SUBSTANCE TREATMENT AGREEMENT was found on 1/2/2018 from Orders Only on 1/2/2018     UDS Summary     Patient has no health maintenance due at this time        Most recent UDS collected today.  I have reviewed the medical records, the Prescription Monitoring Program and I have determined that controlled substance treatment is medically indicated.

## 2018-12-20 NOTE — PROGRESS NOTES
Subjective:     Chief Complaint   Patient presents with   • Pain     upper left side abdomen x 1 week   • Medication Refill       Joie Hart is a 73 y.o. female here today for evaluation and management of:    Rib pain on left side  Pain in the anterior left ribs for the last week.  She denies any trauma to the area.  No cough or SOB.  No swelling of the legs or calf pain.    Chronic headaches  Chronic pain recheck for: chronic headaches  Last dose of controlled substance: yesterday  Chronic pain treated with tramadol 50 mg taken once a day as needed    She  reports that she drinks about 6.0 oz of alcohol per week .  She  reports that she does not use drugs.    Interval history:   Any major change in health since last appointment? No    Consequences of Chronic Opiate therapy:  (5 A's)  Analgesia: Compared to no treatment or prior treatment, pain is currently significantly improved  Activity: significantly improved  Adverse Events:CAPHE@ denies constipation, dry mouth, itchy skin, nausea and sedation  Aberrant Behaviors: She reports she is taking medication as prescribed and is not veering from agreed treatment regimen or provider recommendations. There have been no inappropriate refills or lost/stolen meds reported.  Affect/Mood: Pain is not impacting patient's mood.  Patient denies depression/anxiety.    Nonnarcotic treatments that are being used: Tylenol.         Opioid Risk Score: 2    Interpretation of Opioid Risk Score   Score 0-3 = Low risk of abuse. Do UDS at least once per year.  Score 4-7 = Moderate risk of abuse. Do UDS 1-4 times per year.  Score 8+ = High risk of abuse. Refer to specialist.    Last order of CONTROLLED SUBSTANCE TREATMENT AGREEMENT was found on 1/2/2018 from Orders Only on 1/2/2018     UDS Summary     Patient has no health maintenance due at this time        Most recent UDS collected today.  I have reviewed the medical records, the Prescription Monitoring Program and I have  determined that controlled substance treatment is medically indicated.          Allergies   Allergen Reactions   • Codeine Rash     Rash all over           Current medicines (including changes today)  Current Outpatient Prescriptions   Medication Sig Dispense Refill   • tramadol (ULTRAM) 50 MG Tab Take 1 Tab by mouth every 8 hours as needed for up to 30 days. 60 Tab 0   • acyclovir (ZOVIRAX) 800 MG Tab Take 1 Tab by mouth 2 times a day. 180 Tab 0   • levothyroxine (SYNTHROID) 88 MCG Tab Take 1 Tab by mouth Every morning on an empty stomach. 30 Tab 0   • amitriptyline (ELAVIL) 150 MG Tab TAKE 1 TABLET BY MOUTH AT BEDTIME AS NEEDED 90 Tab 3   • Magnesium 100 MG Cap Take  by mouth.     • Oral Electrolytes (EMERGEN-C ELECTRO MIX PO) Take  by mouth.     • cyanocobalamin (VITAMIN B-12) 500 MCG Tab Take 500 mcg by mouth every day.     • therapeutic multivitamin-minerals (THERAGRAN-M) Tab Take 1 Tab by mouth every day.     • Probiotic Product (PROBIOTIC DAILY PO) Take  by mouth.     • hydrOXYzine HCl (ATARAX) 25 MG Tab Take 1 Tab by mouth every 24 hours as needed for Itching. 90 Tab 0   • ACYCLOVIR PO Take  by mouth.     • Omega-3 Fatty Acids (FISH OIL) 1000 MG Cap capsule Take 1,000 mg by mouth 3 times a day, with meals.       No current facility-administered medications for this visit.        She  has a past medical history of Allergy; Anemia; Anxiety; Cataract; Chronic back pain; Depression (2009); Diverticulitis; Glaucoma; Head injury, closed (3/6/16); HSV infection; IBD (inflammatory bowel disease); Migraine; Pelvic fracture (HCC) (1976); Spondylisthesis; Thyroid disease; and Vaginal vault prolapse (2006).    Patient Active Problem List    Diagnosis Date Noted   • Rib pain on left side 12/12/2018   • Acute buttock pain 07/26/2018   • Pain of cervical spine 06/26/2018   • Acquired hypothyroidism 06/26/2018   • Chronic diarrhea of unknown origin 06/07/2018   • Hematuria 04/11/2018   • Pain of right upper extremity  03/27/2018   • Chronic pain syndrome 03/27/2018   • Abnormal CT of the abdomen 03/27/2018   • White matter abnormality on MRI of brain 01/09/2018   • Chronic headaches 10/05/2017   • Chronic non-specific white matter lesions on MRI 10/05/2017   • Chronic left-sided low back pain with sciatica 07/25/2017   • Seasonal allergic rhinitis due to pollen 05/26/2017   • B12 deficiency 12/20/2016   • Spondylisthesis    • Acquired cyst of kidney 03/23/2016   • Lymphocytic thyroiditis 03/23/2016   • Herpes simplex virus (HSV) infection 11/07/2012   • Other hyperlipidemia 11/07/2012   • Chronic urticaria 11/07/2012   • Osteopenia 11/06/2012       ROS   No fever or chills.  No nausea or vomiting.  No chest pain or palpitations.  No cough or SOB.  No pain with urination or hematuria.  No black or bloody stools.       Objective:     Blood pressure 112/78, pulse 89, temperature 36.4 °C (97.6 °F), temperature source Temporal, resp. rate 14, height 1.524 m (5'), weight 57 kg (125 lb 9.6 oz), SpO2 99 %, not currently breastfeeding. Body mass index is 24.53 kg/m².   Physical Exam:  Well developed, well nourished.  Alert, oriented in no acute distress.  Eye contact is good, speech goal directed, affect calm  Eyes: conjunctiva non-injected, sclera non-icteric.  Neck Supple.  No adenopathy or masses in the neck or supraclavicular regions. No thyromegaly  Lungs: clear to auscultation bilaterally with good excursion. No wheezes or rhonchi  CV: regular rate and rhythm. No murmur  No chest wall tenderness  Abdomen: soft, nontender, no masses or organomegaly.  No rebound or guarding  Ext: no edema, color normal, vascularity normal, temperature normal  Neurological: Alert and oriented x 3. DTRs 2+ and symmetric. No cranial nerve deficit. Strength and sensation intact. Negative Rhomberg. No dysdiadochokinesia.           Assessment and Plan:   The following treatment plan was discussed    1. Rib pain on left side  Discussed possible  costochondritis.  Symptomatic care.  - Pain Management Screen, Urine; Future    2. Chronic nonintractable headache, unspecified headache type  Urine drug screen obtained.  [place]  reviewed.  Continue tramadol every 8 hours as needed for headache.  - tramadol (ULTRAM) 50 MG Tab; Take 1 Tab by mouth every 8 hours as needed for up to 30 days.  Dispense: 60 Tab; Refill: 0  - Consent for Opiate Prescription  - Pain Management Screen, Urine; Future    Any change or worsening of signs or symptoms, patient encouraged to follow-up or report to the emergency room for further evaluation. Patient understands and agrees.    Followup: Return in about 3 months (around 3/12/2019).

## 2018-12-26 ENCOUNTER — TELEPHONE (OUTPATIENT)
Dept: MEDICAL GROUP | Facility: LAB | Age: 73
End: 2018-12-26

## 2018-12-26 NOTE — TELEPHONE ENCOUNTER
ESTABLISHED PATIENT PRE-VISIT PLANNING     Patient was NOT contacted to complete PVP.     Note: Patient will not be contacted if there is no indication to call.     1.  Reviewed notes from the last few office visits within the medical group: Yes    2.  If any orders were placed at last visit or intended to be done for this visit (i.e. 6 mos follow-up), do we have Results/Consult Notes?        •  Labs - Labs ordered, completed on 12/18/18 and results are in chart.       •  Imaging - Imaging was not ordered at last office visit.       •  Referrals - No referrals were ordered at last office visit.    3. Is this appointment scheduled as a Hospital Follow-Up? No    4.  Immunizations were updated in Epic using WebIZ?: Epic matches WebIZ       •  Web Iz Recommendations: FLU, PREVNAR (PCV13)  and SHINGRIX (Shingles)    5.  Patient is due for the following Health Maintenance Topics:   Health Maintenance Due   Topic Date Due   • IMM ZOSTER VACCINES (1 of 2) 06/23/1996   • IMM PNEUMOCOCCAL 65+ (ADULT) LOW/MEDIUM RISK SERIES (1 of 2 - PCV13) 06/23/2011   • IMM INFLUENZA (1) 09/01/2018       - Patient has completed TDAP Immunization(s) per WebIZ. Chart has been updated.    6.  MDX printed for Provider? NO    7.  Patient was NOT informed to arrive 15 min prior to their scheduled appointment and bring in their medication bottles.

## 2018-12-27 ENCOUNTER — OFFICE VISIT (OUTPATIENT)
Dept: MEDICAL GROUP | Facility: LAB | Age: 73
End: 2018-12-27
Payer: MEDICARE

## 2018-12-27 VITALS
HEART RATE: 85 BPM | OXYGEN SATURATION: 95 % | WEIGHT: 124 LBS | BODY MASS INDEX: 24.35 KG/M2 | RESPIRATION RATE: 12 BRPM | DIASTOLIC BLOOD PRESSURE: 62 MMHG | TEMPERATURE: 97.6 F | SYSTOLIC BLOOD PRESSURE: 110 MMHG | HEIGHT: 60 IN

## 2018-12-27 DIAGNOSIS — Z48.02 REMOVAL OF STAPLE: ICD-10-CM

## 2018-12-27 PROCEDURE — 99212 OFFICE O/P EST SF 10 MIN: CPT | Performed by: FAMILY MEDICINE
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
## 2019-01-11 ENCOUNTER — OFFICE VISIT (OUTPATIENT)
Dept: MEDICAL GROUP | Facility: LAB | Age: 74
End: 2019-01-11
Payer: MEDICARE

## 2019-01-11 VITALS
DIASTOLIC BLOOD PRESSURE: 62 MMHG | HEIGHT: 60 IN | BODY MASS INDEX: 24.32 KG/M2 | WEIGHT: 123.9 LBS | TEMPERATURE: 97.8 F | OXYGEN SATURATION: 98 % | SYSTOLIC BLOOD PRESSURE: 106 MMHG | RESPIRATION RATE: 18 BRPM | HEART RATE: 96 BPM

## 2019-01-11 DIAGNOSIS — M54.50 ACUTE MIDLINE LOW BACK PAIN WITHOUT SCIATICA: ICD-10-CM

## 2019-01-11 DIAGNOSIS — M85.80 OSTEOPENIA, UNSPECIFIED LOCATION: ICD-10-CM

## 2019-01-11 DIAGNOSIS — R26.89 BALANCE PROBLEM: ICD-10-CM

## 2019-01-11 DIAGNOSIS — S29.9XXA TRAUMA OF CHEST, INITIAL ENCOUNTER: ICD-10-CM

## 2019-01-11 PROCEDURE — 99214 OFFICE O/P EST MOD 30 MIN: CPT | Performed by: INTERNAL MEDICINE

## 2019-01-11 ASSESSMENT — PATIENT HEALTH QUESTIONNAIRE - PHQ9: CLINICAL INTERPRETATION OF PHQ2 SCORE: 0
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
## 2019-01-11 NOTE — ASSESSMENT & PLAN NOTE
New to me, chronic uncontrolled problem.  We reviewed her DEXA scan from 2017 that shows progression of osteopenia but not still in the osteoporosis range.  She does not have history of osteoporotic fracture personally or family history.  Given her recurrent falls, her PCP would like to discuss with her osteoporotic medication treatment.

## 2019-01-11 NOTE — ASSESSMENT & PLAN NOTE
New to me, chronic controlled.  She told me that she has recent multiple falls for the last 3 years since 2016.  She has fallen twice for the last 2 months.  She she believes all of her falls were mechanical falls and not associated with trauma or fractures so far.  She does not have vertigo or dizziness problems.  No problems with sensation in her feet.  She did tell me that she is concerned about her balance because sometimes she feels if a normal person was in her position it would not fall.  She does want to have a physical therapy evaluation for balance and safety.

## 2019-01-14 ENCOUNTER — TELEPHONE (OUTPATIENT)
Dept: MEDICAL GROUP | Facility: LAB | Age: 74
End: 2019-01-14

## 2019-01-14 ENCOUNTER — HOSPITAL ENCOUNTER (OUTPATIENT)
Dept: RADIOLOGY | Facility: MEDICAL CENTER | Age: 74
End: 2019-01-14
Attending: INTERNAL MEDICINE
Payer: MEDICARE

## 2019-01-14 DIAGNOSIS — M54.50 ACUTE MIDLINE LOW BACK PAIN WITHOUT SCIATICA: ICD-10-CM

## 2019-01-14 DIAGNOSIS — S29.9XXA TRAUMA OF CHEST, INITIAL ENCOUNTER: ICD-10-CM

## 2019-01-15 NOTE — TELEPHONE ENCOUNTER
Summer from imaging calling [id number]  Do you want imaging on hip? If so you will need an order and specify which hip?   Thank you

## 2019-01-16 ENCOUNTER — HOSPITAL ENCOUNTER (OUTPATIENT)
Dept: RADIOLOGY | Facility: MEDICAL CENTER | Age: 74
End: 2019-01-16
Attending: INTERNAL MEDICINE
Payer: MEDICARE

## 2019-01-16 ENCOUNTER — OFFICE VISIT (OUTPATIENT)
Dept: MEDICAL GROUP | Facility: LAB | Age: 74
End: 2019-01-16
Payer: MEDICARE

## 2019-01-16 VITALS
DIASTOLIC BLOOD PRESSURE: 62 MMHG | HEIGHT: 60 IN | RESPIRATION RATE: 20 BRPM | HEART RATE: 87 BPM | WEIGHT: 120 LBS | OXYGEN SATURATION: 95 % | TEMPERATURE: 97.4 F | BODY MASS INDEX: 23.56 KG/M2 | SYSTOLIC BLOOD PRESSURE: 112 MMHG

## 2019-01-16 DIAGNOSIS — G89.29 CHRONIC NONINTRACTABLE HEADACHE, UNSPECIFIED HEADACHE TYPE: ICD-10-CM

## 2019-01-16 DIAGNOSIS — R10.9 RIGHT FLANK PAIN: ICD-10-CM

## 2019-01-16 DIAGNOSIS — R51.9 CHRONIC NONINTRACTABLE HEADACHE, UNSPECIFIED HEADACHE TYPE: ICD-10-CM

## 2019-01-16 LAB
APPEARANCE UR: CLEAR
BILIRUB UR STRIP-MCNC: NEGATIVE MG/DL
COLOR UR AUTO: YELLOW
GLUCOSE UR STRIP.AUTO-MCNC: NEGATIVE MG/DL
KETONES UR STRIP.AUTO-MCNC: NEGATIVE MG/DL
LEUKOCYTE ESTERASE UR QL STRIP.AUTO: NEGATIVE
NITRITE UR QL STRIP.AUTO: NEGATIVE
PH UR STRIP.AUTO: 6.5 [PH] (ref 5–8)
PROT UR QL STRIP: NEGATIVE MG/DL
RBC UR QL AUTO: NEGATIVE
SP GR UR STRIP.AUTO: 1
UROBILINOGEN UR STRIP-MCNC: NEGATIVE MG/DL

## 2019-01-16 PROCEDURE — 81002 URINALYSIS NONAUTO W/O SCOPE: CPT | Performed by: FAMILY MEDICINE

## 2019-01-16 PROCEDURE — 71046 X-RAY EXAM CHEST 2 VIEWS: CPT

## 2019-01-16 PROCEDURE — 72100 X-RAY EXAM L-S SPINE 2/3 VWS: CPT

## 2019-01-16 PROCEDURE — 99214 OFFICE O/P EST MOD 30 MIN: CPT | Performed by: FAMILY MEDICINE

## 2019-01-16 ASSESSMENT — PAIN SCALES - GENERAL: PAINLEVEL: 6=MODERATE PAIN
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
## 2019-01-16 NOTE — PATIENT INSTRUCTIONS
Fall Prevention in the Home  Falls can cause injuries and can affect people from all age groups. There are many simple things that you can do to make your home safe and to help prevent falls.  What can I do on the outside of my home?  · Regularly repair the edges of walkways and driveways and fix any cracks.  · Remove high doorway thresholds.  · Trim any shrubbery on the main path into your home.  · Use bright outdoor lighting.  · Clear walkways of debris and clutter, including tools and rocks.  · Regularly check that handrails are securely fastened and in good repair. Both sides of any steps should have handrails.  · Install guardrails along the edges of any raised decks or porches.  · Have leaves, snow, and ice cleared regularly.  · Use sand or salt on walkways during winter months.  · In the garage, clean up any spills right away, including grease or oil spills.  What can I do in the bathroom?  · Use night lights.  · Install grab bars by the toilet and in the tub and shower. Do not use towel bars as grab bars.  · Use non-skid mats or decals on the floor of the tub or shower.  · If you need to sit down while you are in the shower, use a plastic, non-slip stool.  · Keep the floor dry. Immediately clean up any water that spills on the floor.  · Remove soap buildup in the tub or shower on a regular basis.  · Attach bath mats securely with double-sided non-slip rug tape.  · Remove throw rugs and other tripping hazards from the floor.  What can I do in the bedroom?  · Use night lights.  · Make sure that a bedside light is easy to reach.  · Do not use oversized bedding that drapes onto the floor.  · Have a firm chair that has side arms to use for getting dressed.  · Remove throw rugs and other tripping hazards from the floor.  What can I do in the kitchen?  · Clean up any spills right away.  · Avoid walking on wet floors.  · Place frequently used items in easy-to-reach places.  · If you need to reach for something above  you, use a sturdy step stool that has a grab bar.  · Keep electrical cables out of the way.  · Do not use floor polish or wax that makes floors slippery. If you have to use wax, make sure that it is non-skid floor wax.  · Remove throw rugs and other tripping hazards from the floor.  What can I do in the stairways?  · Do not leave any items on the stairs.  · Make sure that there are handrails on both sides of the stairs. Fix handrails that are broken or loose. Make sure that handrails are as long as the stairways.  · Check any carpeting to make sure that it is firmly attached to the stairs. Fix any carpet that is loose or worn.  · Avoid having throw rugs at the top or bottom of stairways, or secure the rugs with carpet tape to prevent them from moving.  · Make sure that you have a light switch at the top of the stairs and the bottom of the stairs. If you do not have them, have them installed.  What are some other fall prevention tips?  · Wear closed-toe shoes that fit well and support your feet. Wear shoes that have rubber soles or low heels.  · When you use a stepladder, make sure that it is completely opened and that the sides are firmly locked. Have someone hold the ladder while you are using it. Do not climb a closed stepladder.  · Add color or contrast paint or tape to grab bars and handrails in your home. Place contrasting color strips on the first and last steps.  · Use mobility aids as needed, such as canes, walkers, scooters, and crutches.  · Turn on lights if it is dark. Replace any light bulbs that burn out.  · Set up furniture so that there are clear paths. Keep the furniture in the same spot.  · Fix any uneven floor surfaces.  · Choose a carpet design that does not hide the edge of steps of a stairway.  · Be aware of any and all pets.  · Review your medicines with your healthcare provider. Some medicines can cause dizziness or changes in blood pressure, which increase your risk of falling.  Talk with  your health care provider about other ways that you can decrease your risk of falls. This may include working with a physical therapist or  to improve your strength, balance, and endurance.  This information is not intended to replace advice given to you by your health care provider. Make sure you discuss any questions you have with your health care provider.  Document Released: 12/08/2003 Document Revised: 05/16/2017 Document Reviewed: 01/22/2016  [employer] Interactive Patient Education © 2017 [employer]  Contusion  A contusion is a deep bruise. Contusions are the result of a blunt injury to tissues and muscle fibers under the skin. The injury causes bleeding under the skin. The skin overlying the contusion may turn blue, purple, or yellow. Minor injuries will give you a painless contusion, but more severe contusions may stay painful and swollen for a few weeks.  What are the causes?  This condition is usually caused by a blow, trauma, or direct force to an area of the body.  What are the signs or symptoms?  Symptoms of this condition include:  · Swelling of the injured area.  · Pain and tenderness in the injured area.  · Discoloration. The area may have redness and then turn blue, purple, or yellow.  How is this diagnosed?  This condition is diagnosed based on a physical exam and medical history. An X-ray, CT scan, or MRI may be needed to determine if there are any associated injuries, such as broken bones (fractures).  How is this treated?  Specific treatment for this condition depends on what area of the body was injured. In general, the best treatment for a contusion is resting, icing, applying pressure to (compression), and elevating the injured area. This is often called the RICE strategy. Over-the-counter anti-inflammatory medicines may also be recommended for pain control.  Follow these instructions at home:  · Rest the injured area.  · If directed, apply ice to the injured area:  ¨ Put ice in a  plastic bag.  ¨ Place a towel between your skin and the bag.  ¨ Leave the ice on for 20 minutes, 2-3 times per day.  · If directed, apply light compression to the injured area using an elastic bandage. Make sure the bandage is not wrapped too tightly. Remove and reapply the bandage as directed by your health care provider.  · If possible, raise (elevate) the injured area above the level of your heart while you are sitting or lying down.  · Take over-the-counter and prescription medicines only as told by your health care provider.  Contact a health care provider if:  · Your symptoms do not improve after several days of treatment.  · Your symptoms get worse.  · You have difficulty moving the injured area.  Get help right away if:  · You have severe pain.  · You have numbness in a hand or foot.  · Your hand or foot turns pale or cold.  This information is not intended to replace advice given to you by your health care provider. Make sure you discuss any questions you have with your health care provider.  Document Released: 09/27/2006 Document Revised: 04/27/2017 Document Reviewed: 05/04/2016  [employer] Interactive Patient Education © 2017 [employer]

## 2019-01-18 ENCOUNTER — HOSPITAL ENCOUNTER (OUTPATIENT)
Dept: LAB | Facility: MEDICAL CENTER | Age: 74
End: 2019-01-18
Attending: INTERNAL MEDICINE
Payer: MEDICARE

## 2019-01-18 LAB
T4 FREE SERPL-MCNC: 0.88 NG/DL (ref 0.53–1.43)
TSH SERPL DL<=0.005 MIU/L-ACNC: 0.54 UIU/ML (ref 0.38–5.33)

## 2019-01-18 PROCEDURE — 84439 ASSAY OF FREE THYROXINE: CPT

## 2019-01-18 PROCEDURE — 84443 ASSAY THYROID STIM HORMONE: CPT

## 2019-01-18 PROCEDURE — 36415 COLL VENOUS BLD VENIPUNCTURE: CPT

## 2019-01-22 NOTE — ASSESSMENT & PLAN NOTE
Patient continues to have daily headaches.  She hit her head in December and had a negative CT.  Her headaches started before this and she is scheduled to see the neurologist.

## 2019-01-23 ENCOUNTER — TELEPHONE (OUTPATIENT)
Dept: MEDICAL GROUP | Facility: LAB | Age: 74
End: 2019-01-23

## 2019-01-23 NOTE — TELEPHONE ENCOUNTER
DOCUMENTATION OF PAR STATUS:    1. Name of Medication & Dose: Tramadol 50mg     2. Name of Prescription Coverage Company & phone #: [employer] - Member ID [id number]    3. Date Prior Auth Submitted: 01/23/2019    4. What information was given to obtain insurance decision? Submitted via covermymeds  Key: [id number]    5. Prior Auth Status? Pending    6. Patient Notified: N\A

## 2019-01-24 NOTE — TELEPHONE ENCOUNTER
FINAL PRIOR AUTHORIZATION STATUS:    1.  Name of Medication & Dose: Tramadol 50 mg     2. Prior Auth Status: Approved through 12/31/2019     3. Action Taken: Pharmacy Notified: yes Patient Notified: yes

## 2019-02-05 ENCOUNTER — PHYSICAL THERAPY (OUTPATIENT)
Dept: PHYSICAL THERAPY | Facility: MEDICAL CENTER | Age: 74
End: 2019-02-05
Attending: INTERNAL MEDICINE
Payer: MEDICARE

## 2019-02-05 VITALS — DIASTOLIC BLOOD PRESSURE: 82 MMHG | SYSTOLIC BLOOD PRESSURE: 129 MMHG

## 2019-02-05 DIAGNOSIS — R26.89 BALANCE PROBLEM: ICD-10-CM

## 2019-02-05 PROCEDURE — 97112 NEUROMUSCULAR REEDUCATION: CPT

## 2019-02-05 PROCEDURE — 97162 PT EVAL MOD COMPLEX 30 MIN: CPT

## 2019-02-05 SDOH — ECONOMIC STABILITY: GENERAL: QUALITY OF LIFE: GOOD

## 2019-02-05 ASSESSMENT — ENCOUNTER SYMPTOMS
QUALITY: ACHING
PAIN TIMING: INTERMITTENT
PAIN LOCATION: COCCYX, MID BACK
PAIN SCALE: 3
MIGRAINE HEADACHES: 1

## 2019-02-05 ASSESSMENT — GAIT ASSESSMENTS
GAIT WITH VERTICAL HEAD TURNS: MILD IMPAIRMENT: PERFORMS HEAD TURNS SMOOTHLY WITH SLIGHT CHANGE IN GAIT VELOCITY, IE, MINOR DISRUPTION TO SMOOTH GAIT PATH OR USES WALKING AID
GAIT AND PIVOT TURN: NORMAL: PIVOT TURNS SAFELY WITHIN 3 SECONDS AND STOPS QUICKLY WITH NO LOSS OF BALANCE
STEPS: NORMAL: ALTERNATING FEET, NO RAIL
CHANGE IN GAIT SPEED: NORMAL: ABLE TO SMOOTHLY CHANGE WALKING SPEED WITHOUT LOSS OF BALANCE OR GAIT DEVIATION. SHOWS A SIGNIFICANT DIFFERENCE IN WALKING SPEEDS BETWEEN NORMAL, FAST AND SLOW SPEEDS
DYNAMIC GAIT INDEX SCORE: 87.5
GAIT LEVEL SURFACE: NORMAL: WALKS 20', NO ASSISTIVE DEVICES, GOOD SPEED, NO EVIDENCE FOR IMBALANCE, NORMAL GAIT PATTERN
STEP OVER OBSTACLE: NORMAL: IS ABLE TO STEP OVER THE BOX WITHOUT CHANGING GAIT SPEED, NO EVIDENCE OF IMBALANCE
STEP AROUND OBSTACLES: NORMAL: IS ABLE TO WALK AROUND CONES SAFELY WITHOUT CHANGING GAIT SPEED, NO EVIDENCE OF IMBALANCE
GAIT WITH HORIZONTAL HEAD TURNS: MODERATE IMPAIRMENT: PERFORMS HEAD TURNS WITH MODERATE CHANGE IN GAIT VELOCITY, SLOWS DOWN, STAGGERS BUT RECOVERS, CAN CONTINUE TO WALK

## 2019-02-05 ASSESSMENT — ACTIVITIES OF DAILY LIVING (ADL): POOR_BALANCE: 1
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
## 2019-02-06 ENCOUNTER — OFFICE VISIT (OUTPATIENT)
Dept: NEUROLOGY | Facility: MEDICAL CENTER | Age: 74
End: 2019-02-06
Payer: MEDICARE

## 2019-02-06 VITALS
HEIGHT: 60 IN | WEIGHT: 120 LBS | OXYGEN SATURATION: 97 % | TEMPERATURE: 97.7 F | HEART RATE: 86 BPM | BODY MASS INDEX: 23.56 KG/M2 | DIASTOLIC BLOOD PRESSURE: 68 MMHG | SYSTOLIC BLOOD PRESSURE: 106 MMHG

## 2019-02-06 DIAGNOSIS — R90.82 WHITE MATTER ABNORMALITY ON MRI OF BRAIN: ICD-10-CM

## 2019-02-06 DIAGNOSIS — R26.89 BALANCE PROBLEM: ICD-10-CM

## 2019-02-06 DIAGNOSIS — R51.9 NONINTRACTABLE HEADACHE, UNSPECIFIED CHRONICITY PATTERN, UNSPECIFIED HEADACHE TYPE: ICD-10-CM

## 2019-02-06 PROCEDURE — 99214 OFFICE O/P EST MOD 30 MIN: CPT | Performed by: PSYCHIATRY & NEUROLOGY

## 2019-02-06 RX ORDER — BUTALBITAL, ACETAMINOPHEN AND CAFFEINE 50; 325; 40 MG/1; MG/1; MG/1
1 TABLET ORAL EVERY 6 HOURS PRN
Qty: 30 TAB | Refills: 2 | Status: SHIPPED | OUTPATIENT
Start: 2019-02-06 | End: 2019-05-07
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
## 2019-02-07 NOTE — ASSESSMENT & PLAN NOTE
Brain MRI shows small vessel ischemic disease, unlikely demyelination given the subcortical location of her lesions. Agree with Dr. Bloch.     Plan:  1. Continue ASA 81mg daily  2. Continue to work with PCP on BP, cholesterol maintenance.

## 2019-02-07 NOTE — ASSESSMENT & PLAN NOTE
Unclear etiology, possibly multifactorial. Had been told previously she has ankylosing spondylitis. Has scoliosis and degenerative disc disease in her lower back. Mild neuropathy in her left foot.     Plan:  1. Continue with physical therapy

## 2019-02-07 NOTE — ASSESSMENT & PLAN NOTE
1-2 headaches per week with migrainous features, refractory to OTC medications.     Plan:  1. Fioricet q6 hours prn for migraine.

## 2019-02-12 ENCOUNTER — PHYSICAL THERAPY (OUTPATIENT)
Dept: PHYSICAL THERAPY | Facility: MEDICAL CENTER | Age: 74
End: 2019-02-12
Attending: INTERNAL MEDICINE
Payer: MEDICARE

## 2019-02-12 DIAGNOSIS — R26.89 BALANCE PROBLEM: ICD-10-CM

## 2019-02-12 PROCEDURE — 97112 NEUROMUSCULAR REEDUCATION: CPT

## 2019-02-12 PROCEDURE — 97110 THERAPEUTIC EXERCISES: CPT

## 2019-02-12 NOTE — OP THERAPY DAILY TREATMENT
Outpatient Physical Therapy  DAILY TREATMENT     [facility] Outpatient Physical Therapy  [address]  [place] [address]  Phone:  [phone]  Fax:  [phone]    Date: 02/12/2019    Patient: Joie Hart  YOB: 1945  MRN: [id number]     Time Calculation  Start time: 1047  Stop time: 1117 Time Calculation (min): 30 minutes     Chief Complaint: Back Problem and Loss Of Balance    Visit #: 2    SUBJECTIVE:  I need to have some exercises for balance work, I have a BOSU and a big ball.    OBJECTIVE:  Current objective measures: LOB with narrow KATIE          Therapeutic Exercises (CPT 29004):     1. Nu-step , L3 x5 minutes    2. Calf stretches on slant board., 10 reps, 30 sec hold    Therapeutic Treatments and Modalities:     1. Neuromuscular Re-education (CPT 83773), 20 min, obstacle course, stepping over obstacles fwd and sideways, alt tapping on, 1/2 foam roll walking on surfaces, corner exercises tandem, single leg standing, narrow base on cushion, clock balance exercise     Time-based treatments/modalities:  Therapeutic exercise minutes (CPT 14526): 10 minutes  Neuromusc re-ed, balance, coor, post minutes (CPT 41336): 20 minutes       ASSESSMENT:   Response to treatment: balance most challenged with narrow base or head turning    PLAN/RECOMMENDATIONS:   Plan for treatment: therapy treatment to continue next visit.  Planned interventions for next visit: continue with current treatment.

## 2019-02-14 ENCOUNTER — PHYSICAL THERAPY (OUTPATIENT)
Dept: PHYSICAL THERAPY | Facility: MEDICAL CENTER | Age: 74
End: 2019-02-14
Attending: INTERNAL MEDICINE
Payer: MEDICARE

## 2019-02-14 DIAGNOSIS — R26.89 BALANCE PROBLEM: ICD-10-CM

## 2019-02-14 PROCEDURE — 97112 NEUROMUSCULAR REEDUCATION: CPT

## 2019-02-14 PROCEDURE — 97110 THERAPEUTIC EXERCISES: CPT

## 2019-02-14 NOTE — OP THERAPY DAILY TREATMENT
Outpatient Physical Therapy  DAILY TREATMENT     [facility] Outpatient Physical Therapy  [address]  [place] [address]  Phone:  [phone]  Fax:  [phone]    Date: 02/14/2019    Patient: Joie Hart  YOB: 1945  MRN: [id number]     Time Calculation  Start time: 1017  Stop time: 1047 Time Calculation (min): 30 minutes     Chief Complaint: Loss Of Balance    Visit #: 3    SUBJECTIVE:  No soreness since last visit.      OBJECTIVE:  Current objective measures: LOB with narrow KATIE          Therapeutic Exercises (CPT 74833):     1. Nu-step , L5 x5 minutes    2. Calf stretches on slant board., 10 reps, 30 sec hold    Therapeutic Treatments and Modalities:     1. Neuromuscular Re-education (CPT 36635), 20 min, rocker board, BOSU and aidan disc work in II bars with eyes open and closed, head movement and weight shifting, 1/2 foam roll work walking on rounded part fwd and sideways    Time-based treatments/modalities:  Therapeutic exercise minutes (CPT 98794): 10 minutes  Neuromusc re-ed, balance, coor, post minutes (CPT 94456): 20 minutes       Pain rating before treatment: 0  Pain rating after treatment: 0    ASSESSMENT:   Response to treatment: improving balance with narrow KATIE    PLAN/RECOMMENDATIONS:   Plan for treatment: therapy treatment to continue next visit.  Planned interventions for next visit: continue with current treatment.

## 2019-02-19 ENCOUNTER — PHYSICAL THERAPY (OUTPATIENT)
Dept: PHYSICAL THERAPY | Facility: MEDICAL CENTER | Age: 74
End: 2019-02-19
Attending: INTERNAL MEDICINE
Payer: MEDICARE

## 2019-02-19 DIAGNOSIS — R26.89 BALANCE PROBLEM: ICD-10-CM

## 2019-02-19 PROCEDURE — 97110 THERAPEUTIC EXERCISES: CPT

## 2019-02-19 PROCEDURE — 97112 NEUROMUSCULAR REEDUCATION: CPT

## 2019-02-19 NOTE — OP THERAPY DAILY TREATMENT
Outpatient Physical Therapy  DAILY TREATMENT     [facility] Outpatient Physical Therapy  [address]  [place] [address]  Phone:  [phone]  Fax:  [phone]    Date: 02/19/2019    Patient: Joie Hart  YOB: 1945  MRN: [id number]     Time Calculation  Start time: 1049  Stop time: 1123 Time Calculation (min): 34 minutes     Chief Complaint: Loss Of Balance    Visit #: 4    SUBJECTIVE:  I felt good, no problems with last visit.    OBJECTIVE:  Current objective measures: left big toe seems to impact the balance the most with left foot ahead in tandem          Therapeutic Exercises (CPT 25657):     1. Nu-step , L5 x5 minutes    2. Calf stretches on slant board., 10 reps, 30 sec hold    Therapeutic Treatments and Modalities:     1. Neuromuscular Re-education (CPT 13501), 20 min, BOSU work in corner getting on/off with high back chair for support, varied obstacle course with step up and around objects both sideways and fwd, kicking bacll with alt feet on wall, trial of 30 # ball pushes (increased sciatic pressure so stopped exercise), foam roll work fwd and sideways, walking on 1/2 foam roll    Time-based treatments/modalities:  Therapeutic exercise minutes (CPT 62852): 15 minutes  Neuromusc re-ed, balance, coor, post minutes (CPT 48007): 19 minutes     ASSESSMENT:   Response to treatment: improving in balance     PLAN/RECOMMENDATIONS:   Plan for treatment: therapy treatment to continue next visit.  Planned interventions for next visit: continue with current treatment.

## 2019-02-21 ENCOUNTER — PHYSICAL THERAPY (OUTPATIENT)
Dept: PHYSICAL THERAPY | Facility: MEDICAL CENTER | Age: 74
End: 2019-02-21
Attending: INTERNAL MEDICINE
Payer: MEDICARE

## 2019-02-21 DIAGNOSIS — R26.89 BALANCE PROBLEM: ICD-10-CM

## 2019-02-21 PROCEDURE — 97112 NEUROMUSCULAR REEDUCATION: CPT

## 2019-02-21 PROCEDURE — 97110 THERAPEUTIC EXERCISES: CPT

## 2019-02-21 NOTE — OP THERAPY DAILY TREATMENT
Outpatient Physical Therapy  DAILY TREATMENT     [facility] Outpatient Physical Therapy  [address]  [place] [address]  Phone:  [phone]  Fax:  [phone]    Date: 02/21/2019    Patient: Joie Hart  YOB: 1945  MRN: [id number]     Time Calculation  Start time: 1047  Stop time: 1114 Time Calculation (min): 27 minutes     Chief Complaint: Loss Of Balance    Visit #: 5    SUBJECTIVE:  Balance exercises going well.    OBJECTIVE:  Current objective measures: improving balance with narrow KATIE          Therapeutic Exercises (CPT 28058):     1. Nu-step , L5 x5 minutes    2. Calf stretches on slant board., 10 reps, 30 sec hold    Therapeutic Treatments and Modalities:     1. Neuromuscular Re-education (CPT 33383), 20 min, BOSU home ideas, work in corner getting on/off with high back chair for support, varied obstacle course with step up and around objects both sideways and fwd, kicking bacll with alt feet on wall, foam roll work fwd and sideways, walking on 1/2 foam roll    Time-based treatments/modalities:  Therapeutic exercise minutes (CPT 35642): 7 minutes  Neuromusc re-ed, balance, coor, post minutes (CPT 13737): 20 minutes       ASSESSMENT:   Response to treatment: some improvement with BOSU work    PLAN/RECOMMENDATIONS:   Plan for treatment: therapy treatment to continue next visit.  Planned interventions for next visit: continue with current treatment.

## 2019-02-26 ENCOUNTER — PHYSICAL THERAPY (OUTPATIENT)
Dept: PHYSICAL THERAPY | Facility: MEDICAL CENTER | Age: 74
End: 2019-02-26
Attending: INTERNAL MEDICINE
Payer: MEDICARE

## 2019-02-26 DIAGNOSIS — R26.89 BALANCE PROBLEM: ICD-10-CM

## 2019-02-26 PROCEDURE — 97112 NEUROMUSCULAR REEDUCATION: CPT

## 2019-02-26 PROCEDURE — 97110 THERAPEUTIC EXERCISES: CPT

## 2019-02-26 NOTE — OP THERAPY DAILY TREATMENT
Outpatient Physical Therapy  DAILY TREATMENT     [facility] Outpatient Physical Therapy  [address]  [place] [address]  Phone:  [phone]  Fax:  [phone]    Date: 02/26/2019    Patient: Joie Hart  YOB: 1945  MRN: [id number]     Time Calculation  Start time: 1050  Stop time: 1116 Time Calculation (min): 26 minutes     Chief Complaint: Loss Of Balance    Visit #: 6    SUBJECTIVE:  Overall my balance seems better.  I have really noticed it.    OBJECTIVE:  Current objective measures: tandem stance difficult          Therapeutic Exercises (CPT 03488):     1. Nu-step , L5 x 3minutes    2. Calf stretches on slant board., 10 reps, 30 sec hold    Therapeutic Treatments and Modalities:     1. Neuromuscular Re-education (CPT 65689), 20 min, BOSU work with step ups or lunges, obstacle course with step up and around objects of varied height and surface, both sideways and fwd, 1/2 foam roll work, aidan discs in II bars    Time-based treatments/modalities:  Therapeutic exercise minutes (CPT 88940): 6 minutes  Neuromusc re-ed, balance, coor, post minutes (CPT 87797): 20 minutes       Pain rating before treatment: 0  Pain rating after treatment: 0    ASSESSMENT:   Response to treatment: narrow KATIE with better control.    PLAN/RECOMMENDATIONS:   Plan for treatment: therapy treatment to continue next visit.  Planned interventions for next visit: continue with current treatment.

## 2019-02-28 ENCOUNTER — PHYSICAL THERAPY (OUTPATIENT)
Dept: PHYSICAL THERAPY | Facility: MEDICAL CENTER | Age: 74
End: 2019-02-28
Attending: INTERNAL MEDICINE
Payer: MEDICARE

## 2019-02-28 DIAGNOSIS — R26.89 BALANCE PROBLEM: ICD-10-CM

## 2019-02-28 PROCEDURE — 97112 NEUROMUSCULAR REEDUCATION: CPT

## 2019-02-28 PROCEDURE — 97110 THERAPEUTIC EXERCISES: CPT

## 2019-02-28 NOTE — OP THERAPY DAILY TREATMENT
Outpatient Physical Therapy  DAILY TREATMENT     [facility] Outpatient Physical Therapy  [address]  [place] [address]  Phone:  [phone]  Fax:  [phone]    Date: 02/28/2019    Patient: Joie Hart  YOB: 1945  MRN: [id number]     Time Calculation  Start time: 1017  Stop time: 1047 Time Calculation (min): 30 minutes     Chief Complaint: Loss Of Balance    Visit #: 7    SUBJECTIVE:  I am doing well.    OBJECTIVE:  Current objective measures: able to tandem stand on 1/2 foam roll with out LOB          Therapeutic Exercises (CPT 14265):     1. Nu-step , L5 x 4 minutes, S6 A11    2. Calf stretches on slant board., 10 reps, 30 sec hold    Therapeutic Treatments and Modalities:     1. Neuromuscular Re-education (CPT 75297), 20 min, aidan disc work out of II bars, taps on objects (slow and controlled), dips off 1/2 foam roll with walking fwd, slow and precise side step on 1/2 foam    Time-based treatments/modalities:  Therapeutic exercise minutes (CPT 88820): 10 minutes  Neuromusc re-ed, balance, coor, post minutes (CPT 80788): 20 minutes       Pain rating before treatment: 0  Pain rating after treatment: 0    ASSESSMENT:   Response to treatment: improving in balance    PLAN/RECOMMENDATIONS:   Plan for treatment: therapy treatment to continue next visit.  Planned interventions for next visit: continue with current treatment.

## 2019-03-04 ENCOUNTER — PHYSICAL THERAPY (OUTPATIENT)
Dept: PHYSICAL THERAPY | Facility: MEDICAL CENTER | Age: 74
End: 2019-03-04
Attending: INTERNAL MEDICINE
Payer: MEDICARE

## 2019-03-04 DIAGNOSIS — R26.89 BALANCE PROBLEM: ICD-10-CM

## 2019-03-04 PROCEDURE — 97110 THERAPEUTIC EXERCISES: CPT

## 2019-03-04 PROCEDURE — 97112 NEUROMUSCULAR REEDUCATION: CPT

## 2019-03-04 NOTE — OP THERAPY DAILY TREATMENT
"  Outpatient Physical Therapy  DAILY TREATMENT     [facility] Outpatient Physical Therapy  [address]  [place] [address]  Phone:  [phone]  Fax:  [phone]    Date: 03/04/2019    Patient: Joie Hart  YOB: 1945  MRN: [id number]     Time Calculation  Start time: 1147  Stop time: 1217 Time Calculation (min): 30 minutes     Chief Complaint: Loss Of Balance    Visit #: 8    SUBJECTIVE:  Balance has been pretty good, no problems.    OBJECTIVE:  Current objective measures: tandem balance work improving          Therapeutic Exercises (CPT 34745):     1. Nu-step , L6 x 3 min, S6 A11    2. Calf stretches on slant board., 10 reps, 30 sec hold    Therapeutic Treatments and Modalities:     1. Neuromuscular Re-education (CPT 73894), 20 min, aidan disc work out of II bars, taps on objects (slow and controlled), dips off 1/2 foam roll with walking fwd, slow and precise side step on 1/2 foam, cone work in parallel and in clock formation, walking tandem fwd and retro, nike swish \"rock\" work    Time-based treatments/modalities:  Therapeutic exercise minutes (CPT 90392): 10 minutes  Neuromusc re-ed, balance, coor, post minutes (CPT 64028): 20 minutes       ASSESSMENT:   Response to treatment: improving balance    PLAN/RECOMMENDATIONS:   Plan for treatment: therapy treatment to continue next visit.  Planned interventions for next visit: continue with current treatment.      "

## 2019-03-06 ENCOUNTER — PHYSICAL THERAPY (OUTPATIENT)
Dept: PHYSICAL THERAPY | Facility: MEDICAL CENTER | Age: 74
End: 2019-03-06
Attending: INTERNAL MEDICINE
Payer: MEDICARE

## 2019-03-06 DIAGNOSIS — R26.89 BALANCE PROBLEM: ICD-10-CM

## 2019-03-06 PROCEDURE — 97112 NEUROMUSCULAR REEDUCATION: CPT

## 2019-03-06 PROCEDURE — 97110 THERAPEUTIC EXERCISES: CPT

## 2019-03-06 NOTE — OP THERAPY DAILY TREATMENT
Outpatient Physical Therapy  DAILY TREATMENT     [facility] Outpatient Physical Therapy  [address]  [place] [address]  Phone:  [phone]  Fax:  [phone]    Date: 03/06/2019    Patient: Joie Hart  YOB: 1945  MRN: [id number]     Time Calculation  Start time: 1315  Stop time: 1345 Time Calculation (min): 30 minutes     Chief Complaint: Loss Of Balance    Visit #: 9    SUBJECTIVE:  I am doing well.  I did recover from a few mis steps without problems.    OBJECTIVE:  Current objective measures: narrow KATIE improving          Therapeutic Exercises (CPT 97685):     1. Nu-step , L4 x 3 min, S6 A11    2. Calf stretches on slant board., 10 reps, 30 sec hold    Therapeutic Treatments and Modalities:     1. Neuromuscular Re-education (CPT 38093), 20 min, taps on objects (slow and controlled), dips off 1/2 foam roll with walking fwd, slow and precise side step on 1/2 foam, cone work in parallel and in clock formation, walking tandem fwd and retro, uneven surface work with step up/down on firm step and rocker board    Time-based treatments/modalities:  Therapeutic exercise minutes (CPT 31992): 10 minutes  Neuromusc re-ed, balance, coor, post minutes (CPT 79138): 20 minutes         ASSESSMENT:   Response to treatment: better tandem work on varied surfaces    PLAN/RECOMMENDATIONS:   Plan for treatment: therapy treatment to continue next visit.  Planned interventions for next visit: continue with current treatment.

## 2019-03-12 ENCOUNTER — OFFICE VISIT (OUTPATIENT)
Dept: MEDICAL GROUP | Facility: LAB | Age: 74
End: 2019-03-12
Payer: MEDICARE

## 2019-03-12 ENCOUNTER — PHYSICAL THERAPY (OUTPATIENT)
Dept: PHYSICAL THERAPY | Facility: MEDICAL CENTER | Age: 74
End: 2019-03-12
Attending: INTERNAL MEDICINE
Payer: MEDICARE

## 2019-03-12 VITALS
WEIGHT: 122 LBS | SYSTOLIC BLOOD PRESSURE: 114 MMHG | DIASTOLIC BLOOD PRESSURE: 64 MMHG | OXYGEN SATURATION: 94 % | RESPIRATION RATE: 20 BRPM | HEART RATE: 84 BPM | HEIGHT: 60 IN | TEMPERATURE: 97 F | BODY MASS INDEX: 23.95 KG/M2

## 2019-03-12 DIAGNOSIS — R26.89 BALANCE PROBLEM: ICD-10-CM

## 2019-03-12 DIAGNOSIS — Z13.1 SCREENING FOR DIABETES MELLITUS: ICD-10-CM

## 2019-03-12 DIAGNOSIS — E78.49 OTHER HYPERLIPIDEMIA: ICD-10-CM

## 2019-03-12 DIAGNOSIS — E55.9 VITAMIN D DEFICIENCY: ICD-10-CM

## 2019-03-12 DIAGNOSIS — N64.4 BREAST PAIN, LEFT: ICD-10-CM

## 2019-03-12 DIAGNOSIS — G89.29 CHRONIC BILATERAL THORACIC BACK PAIN: ICD-10-CM

## 2019-03-12 DIAGNOSIS — E53.8 B12 DEFICIENCY: ICD-10-CM

## 2019-03-12 DIAGNOSIS — M54.6 CHRONIC BILATERAL THORACIC BACK PAIN: ICD-10-CM

## 2019-03-12 DIAGNOSIS — Z12.31 ENCOUNTER FOR SCREENING MAMMOGRAM FOR BREAST CANCER: ICD-10-CM

## 2019-03-12 PROCEDURE — 99214 OFFICE O/P EST MOD 30 MIN: CPT | Performed by: FAMILY MEDICINE

## 2019-03-12 PROCEDURE — 97110 THERAPEUTIC EXERCISES: CPT

## 2019-03-12 PROCEDURE — 97112 NEUROMUSCULAR REEDUCATION: CPT

## 2019-03-12 RX ORDER — LEVOTHYROXINE SODIUM 137 UG/1
137 TABLET ORAL
COMMUNITY
End: 2019-04-17

## 2019-03-12 RX ORDER — ASCORBIC ACID 125 MG
TABLET,CHEWABLE ORAL
COMMUNITY
End: 2019-06-18

## 2019-03-12 ASSESSMENT — GAIT ASSESSMENTS
GAIT WITH HORIZONTAL HEAD TURNS: MILD IMPAIRMENT: PERFORMS HEAD TURNS SMOOTHLY WITH SLIGHT CHANGE IN GAIT VELOCITY, IE, MINOR DISRUPTION TO SMOOTH GAIT PATH OR USES WALKING AID
GAIT LEVEL SURFACE: NORMAL: WALKS 20', NO ASSISTIVE DEVICES, GOOD SPEED, NO EVIDENCE FOR IMBALANCE, NORMAL GAIT PATTERN
CHANGE IN GAIT SPEED: NORMAL: ABLE TO SMOOTHLY CHANGE WALKING SPEED WITHOUT LOSS OF BALANCE OR GAIT DEVIATION. SHOWS A SIGNIFICANT DIFFERENCE IN WALKING SPEEDS BETWEEN NORMAL, FAST AND SLOW SPEEDS
DYNAMIC GAIT INDEX SCORE: 95.83
STEPS: NORMAL: ALTERNATING FEET, NO RAIL
STEP OVER OBSTACLE: NORMAL: IS ABLE TO STEP OVER THE BOX WITHOUT CHANGING GAIT SPEED, NO EVIDENCE OF IMBALANCE
STEP AROUND OBSTACLES: NORMAL: IS ABLE TO WALK AROUND CONES SAFELY WITHOUT CHANGING GAIT SPEED, NO EVIDENCE OF IMBALANCE
GAIT AND PIVOT TURN: NORMAL: PIVOT TURNS SAFELY WITHIN 3 SECONDS AND STOPS QUICKLY WITH NO LOSS OF BALANCE
GAIT WITH VERTICAL HEAD TURNS: NORMAL: PERFORMS HEAD TURNS SMOOTHLY WITH NO CHANGE IN GAIT

## 2019-03-12 NOTE — OP THERAPY DISCHARGE SUMMARY
Outpatient Physical Therapy  DISCHARGE SUMMARY NOTE      [facility] Outpatient Physical Therapy  [address]  [place] [address]  Phone:  [phone]  Fax:  [phone]    Date of Visit: 03/12/2019    Patient: Joie Hart  YOB: 1945  MRN: [id number]     Referring Provider: Nicol Deras [place] [address] [place] [address]   Referring Diagnosis Other abnormalities of gait and mobility [R26.89]     Physical Therapy Occurrence Codes    Date of onset of impairment:  2/5/19   Date physical therapy care plan established or reviewed:  2/5/19   Date physical therapy treatment started:  2/5/19          Functional Limitations and Severity Modifiers  Dynamic Gait Index Total Score: 95.83     Your patient is being discharged from Physical Therapy with the following comments:   · Goals met  · Goals partially met    Comments:  Joie reports nothing new, everything has been really good with her balance.  She reports no fall since starting PT and reports feeling very stable.    OBJECTIVE:  Current objective measures:   Hip flexion L 5/5 R 5/5  Hip Abd L 4+/5  R 4+/5  Hip Add L 4+/5  R 4+/5  Hip IR L 4/5  R 4/5  Hip ER L 4/5  R 4/5  Knee Flexion L 4+/5 R 4+/5  Knee Extension L 4/5 R 4/5  PF L 5/5  R 5/5  DF L 5/5  R 5/5    Single leg standing 30 sec each leg.  Dynamic Gait Index Total Score: 95.83  (23/24)     Goals:   Short Term Goals:   1. Pt independent and compliant with HEP(goal met)  2. Decrease falls to 0 in 1 month period(goal met)  3. Pt follow-up with MD re: recent increase in BP medication(goal met)  Short term goal time span:  2-4 weeks      Long Term Goals:    1. Increase B hip and knee MMT strength to 5/5(goal met/ nearly met)  2. Improve DGI to 24/24 to demo decreased fall risk and improved balance(goal met/ nearly met, 23/24)  3. Improve R and L SLS to 20 seconds(goal met)  Long term goal time span:  4-6 weeks    Limitations Remaining:  Pt does not have  any limitations with her balance.      Recommendations:  Pt to continue to progress with balance exercises independently and PT chart to be discharged.    Yuliet Avelar PT, MSPT    Date: 3/12/2019

## 2019-03-12 NOTE — OP THERAPY DAILY TREATMENT
Outpatient Physical Therapy  DAILY TREATMENT     [facility] Outpatient Physical Therapy  [address]  [place] [address]  Phone:  [phone]  Fax:  [phone]    Date: 03/12/2019    Patient: Joie Hart  YOB: 1945  MRN: [id number]     Time Calculation  Start time: 1145  Stop time: 1218 Time Calculation (min): 33 minutes     Chief Complaint: Loss Of Balance    Visit #: 10    SUBJECTIVE:  Nothing new.  Balance has been really great.  No fall since starting PT.  Pt reports feeling very stable.    OBJECTIVE:  Current objective measures:   Hip flexion L 5/5 R 5/5  Hip Abd L 4+/5  R 4+/5  Hip Add L 4+/5  R 4+/5  Hip IR L 4/5  R 4/5  Hip ER L 4/5  R 4/5  Knee Flexion L 4+/5 R 4+/5  Knee Extension L 4/5 R 4/5  PF L 5/5  R 5/5  DF L 5/5  R 5/5    Single leg standing 30 sec each leg.  Dynamic Gait Index Total Score: 95.83  (23/24)      Therapeutic Exercises (CPT 64455):     1. Nu-step , L4 x 3 min, S6 A11    2. Calf stretches on slant board., 10 reps, 30 sec hold    Therapeutic Treatments and Modalities:     1. Neuromuscular Re-education (CPT 05109), 20 min, DGI test, SLS test, obstacle work, 1/2 foam roll work, tandem work     Time-based treatments/modalities:  Therapeutic exercise minutes (CPT 73516): 10 minutes  Neuromusc re-ed, balance, coor, post minutes (CPT 74658): 23 minutes     ASSESSMENT:   Response to treatment: balance much improved.    PLAN/RECOMMENDATIONS:   Plan for treatment: therapy treatment to continue next visit.  Planned interventions for next visit: continue with current treatment.

## 2019-03-21 NOTE — ASSESSMENT & PLAN NOTE
"Patient has a history of chronic bilateral thoracic back pain.  Certain activities seem to make it worse.    No \"RED FLAGS’ FOR FRACTURE: (Recent major trauma, Minor trauma or strenuous lifting in older or potentially osteoporotic patient, History of chronic corticosteroid therapy)  No \"RED FLAGS\" FOR NEOPLASM OR INFECTION: (Age over 50 or under 20, History of cancer, ml. breast, lung, prostate, Recent fever/chills, Recent unexplained weight loss,  Recent bacterial infection, current IV drug use, Immunosuppression (from meds, HIV, etc.), Pain worse when supine or at night)  No \"RED FLAGS’ FOR CAUDA EQUINA SYNDROME: (Saddle anesthesia, urine retention, fecal incontinence, Severe or progressive LE neurologic deficit)  Patient does not want to do physical therapy for this at this time but she would like some exercises for it.      "

## 2019-03-21 NOTE — PROGRESS NOTES
"Subjective:     Chief Complaint   Patient presents with   • Breast Pain     L Breast       Joie Hart is a 73 y.o. female here today for evaluation and management of:    Vitamin D deficiency  Patient has a known vitamin D deficiency.  She has not been supplementing her vitamin D.  She denies any recent fractures.    Other hyperlipidemia  Patient has known hyperlipidemia.  She would like to avoid medications if possible.  She is taking omega-3 fatty acids.    Chronic bilateral thoracic back pain  Patient has a history of chronic bilateral thoracic back pain.  Certain activities seem to make it worse.    No \"RED FLAGS’ FOR FRACTURE: (Recent major trauma, Minor trauma or strenuous lifting in older or potentially osteoporotic patient, History of chronic corticosteroid therapy)  No \"RED FLAGS\" FOR NEOPLASM OR INFECTION: (Age over 50 or under 20, History of cancer, ml. breast, lung, prostate, Recent fever/chills, Recent unexplained weight loss,  Recent bacterial infection, current IV drug use, Immunosuppression (from meds, HIV, etc.), Pain worse when supine or at night)  No \"RED FLAGS’ FOR CAUDA EQUINA SYNDROME: (Saddle anesthesia, urine retention, fecal incontinence, Severe or progressive LE neurologic deficit)  Patient does not want to do physical therapy for this at this time but she would like some exercises for it.        Breast pain, left  Patient is complaining of some left breast pain laterally for the last several months.  She is due for a mammogram and would like a diagnostic if possible.  She denies any nipple discharge or skin changes    B12 deficiency  Patient has known B12 deficiency and is supplementing with sublingual B12.       Allergies   Allergen Reactions   • Codeine Rash     Rash all over           Current medicines (including changes today)  Current Outpatient Prescriptions   Medication Sig Dispense Refill   • levothyroxine (SYNTHROID) 137 MCG Tab Take 137 mcg by mouth Every morning on an " empty stomach.     • Magnesium Citrate 125 MG Cap Take  by mouth.     • amitriptyline (ELAVIL) 150 MG Tab TAKE 1 TABLET BY MOUTH AT BEDTIME AS NEEDED 90 Tab 1   • acyclovir (ZOVIRAX) 800 MG Tab Take 1 Tab by mouth 2 times a day. 180 Tab 0   • hydrOXYzine HCl (ATARAX) 25 MG Tab Take 1 Tab by mouth every 24 hours as needed for Itching. 90 Tab 0   • Magnesium 100 MG Cap Take  by mouth.     • Omega-3 Fatty Acids (FISH OIL) 1000 MG Cap capsule Take 1,000 mg by mouth 3 times a day, with meals.     • cyanocobalamin (VITAMIN B-12) 500 MCG Tab Take 500 mcg by mouth every day.     • therapeutic multivitamin-minerals (THERAGRAN-M) Tab Take 1 Tab by mouth every day.     • Probiotic Product (PROBIOTIC DAILY PO) Take  by mouth.     • acetaminophen/caffeine/butalbital 325-40-50 mg (FIORICET) -40 MG Tab Take 1 Tab by mouth every 6 hours as needed for Headache for up to 90 days. 30 Tab 2   • levothyroxine (SYNTHROID) 88 MCG Tab Take 1 Tab by mouth Every morning on an empty stomach. 30 Tab 0   • Oral Electrolytes (EMERGEN-C ELECTRO MIX PO) Take  by mouth.     • ACYCLOVIR PO Take  by mouth.       No current facility-administered medications for this visit.        She  has a past medical history of Allergy; Anemia; Anxiety; Cataract; Chronic back pain; Depression (2009); Diverticulitis; Glaucoma; Head injury, closed (3/6/16); HSV infection; IBD (inflammatory bowel disease); Migraine; Pelvic fracture (HCC) (1976); Spondylisthesis; Thyroid disease; and Vaginal vault prolapse (2006).    Patient Active Problem List    Diagnosis Date Noted   • Breast pain, left 03/12/2019   • Chronic bilateral thoracic back pain 03/12/2019   • Vitamin D deficiency 03/12/2019   • Right flank pain 01/16/2019   • Acute midline low back pain without sciatica 01/11/2019   • Balance problem 01/11/2019   • Chest trauma 01/11/2019   • Rib pain on left side 12/12/2018   • Acute buttock pain 07/26/2018   • Pain of cervical spine 06/26/2018   • Acquired  hypothyroidism 06/26/2018   • Chronic diarrhea of unknown origin 06/07/2018   • Hematuria 04/11/2018   • Pain of right upper extremity 03/27/2018   • Chronic pain syndrome 03/27/2018   • Abnormal CT of the abdomen 03/27/2018   • White matter abnormality on MRI of brain 01/09/2018   • Chronic headaches 10/05/2017   • Chronic non-specific white matter lesions on MRI 10/05/2017   • Chronic left-sided low back pain with sciatica 07/25/2017   • Seasonal allergic rhinitis due to pollen 05/26/2017   • B12 deficiency 12/20/2016   • Nonintractable headache 06/17/2016   • Spondylisthesis    • Acquired cyst of kidney 03/23/2016   • Lymphocytic thyroiditis 03/23/2016   • Herpes simplex virus (HSV) infection 11/07/2012   • Other hyperlipidemia 11/07/2012   • Chronic urticaria 11/07/2012   • Osteopenia 11/06/2012       ROS   No fever or chills.  No nausea or vomiting.  No chest pain or palpitations.  No cough or SOB.  No pain with urination or hematuria.  No black or bloody stools.       Objective:     Blood pressure 114/64, pulse 84, temperature 36.1 °C (97 °F), temperature source Temporal, resp. rate 20, height 1.524 m (5'), weight 55.3 kg (122 lb), SpO2 94 %, not currently breastfeeding. Body mass index is 23.83 kg/m².   Physical Exam:  Well developed, well nourished.  Alert, oriented in no acute distress.  Eye contact is good, speech goal directed, affect calm  Eyes: conjunctiva non-injected, sclera non-icteric.  Neck Supple.  No adenopathy or masses in the neck or supraclavicular regions. No thyromegaly  Lungs: clear to auscultation bilaterally with good excursion. No wheezes or rhonchi  CV: regular rate and rhythm. No murmur  Abdomen: soft, nontender, no masses or organomegaly.  No rebound or guarding  SPINE: No significant spinal curvature on forward bend. Mild tenderness in paraspinous muscles thoracic spine without current spasm. SLT negative. DTR 2+ patella, 1+ achilles bilaterally. Strength 5/5 proximal and distal LE.   No pain with stress of SI. Full hip ROM. Poor hamstring flexibility. No symptoms with axial loading.   BREAST EXAM: No skin changes, peau d'orange or nipple retraction. No discharge. No axillary or supraclavicular adenopathy. No masses or nodularity palpable. Tender to palpation of the left lateral breast              Assessment and Plan:   The following treatment plan was discussed    1. Breast pain, left  Diagnostic mammogram with ultrasound to assess.  Await results.  Discussed decreasing caffeine to help with pain.  - US-BREAST LIMITED-LEFT; Future  - MA-DIAGNOSTIC MAMMO W/CAD-BILAT; Future    2. Chronic bilateral thoracic back pain  Discussed physical therapy.  Patient does not want to do at this time.  Handout given on exercises she can do to    3. Encounter for screening mammogram for breast cancer    Diagnostic mammogram with ultrasound to assess.  Await results    4. B12 deficiency  Check B12 level and adjust supplementation as needed  - VITAMIN B12; Future    5. Vitamin D deficiency  Check vitamin D level and adjust supplementation as needed  - VITAMIN D,25 HYDROXY; Future    6. Other hyperlipidemia  Check lipid panel.  Discussed how treating with statins can decrease risk of cardiovascular disease.  Low-fat diet information given  - Lipid Profile; Future    7. Screening for diabetes mellitus  Screening labs ordered.  Await results for counseling.    - Comp Metabolic Panel; Future    Any change or worsening of signs or symptoms, patient encouraged to follow-up or report to the emergency room for further evaluation. Patient understands and agrees.    Followup: Return in about 3 months (around 6/12/2019).

## 2019-03-21 NOTE — ASSESSMENT & PLAN NOTE
Patient has known hyperlipidemia.  She would like to avoid medications if possible.  She is taking omega-3 fatty acids.

## 2019-03-21 NOTE — ASSESSMENT & PLAN NOTE
Patient is complaining of some left breast pain laterally for the last several months.  She is due for a mammogram and would like a diagnostic if possible.  She denies any nipple discharge or skin changes

## 2019-03-21 NOTE — ASSESSMENT & PLAN NOTE
Patient has a known vitamin D deficiency.  She has not been supplementing her vitamin D.  She denies any recent fractures.

## 2019-04-03 ENCOUNTER — HOSPITAL ENCOUNTER (OUTPATIENT)
Dept: LAB | Facility: MEDICAL CENTER | Age: 74
End: 2019-04-03
Attending: FAMILY MEDICINE
Payer: MEDICARE

## 2019-04-03 DIAGNOSIS — E55.9 VITAMIN D DEFICIENCY: ICD-10-CM

## 2019-04-03 DIAGNOSIS — E53.8 B12 DEFICIENCY: ICD-10-CM

## 2019-04-03 DIAGNOSIS — Z13.1 SCREENING FOR DIABETES MELLITUS: ICD-10-CM

## 2019-04-03 DIAGNOSIS — E78.49 OTHER HYPERLIPIDEMIA: ICD-10-CM

## 2019-04-03 LAB
25(OH)D3 SERPL-MCNC: 31 NG/ML (ref 30–100)
ALBUMIN SERPL BCP-MCNC: 4.3 G/DL (ref 3.2–4.9)
ALBUMIN/GLOB SERPL: 2 G/DL
ALP SERPL-CCNC: 84 U/L (ref 30–99)
ALT SERPL-CCNC: 18 U/L (ref 2–50)
ANION GAP SERPL CALC-SCNC: 5 MMOL/L (ref 0–11.9)
AST SERPL-CCNC: 18 U/L (ref 12–45)
BILIRUB SERPL-MCNC: 0.4 MG/DL (ref 0.1–1.5)
BUN SERPL-MCNC: 11 MG/DL (ref 8–22)
CALCIUM SERPL-MCNC: 9.3 MG/DL (ref 8.5–10.5)
CHLORIDE SERPL-SCNC: 105 MMOL/L (ref 96–112)
CHOLEST SERPL-MCNC: 215 MG/DL (ref 100–199)
CO2 SERPL-SCNC: 28 MMOL/L (ref 20–33)
CREAT SERPL-MCNC: 0.74 MG/DL (ref 0.5–1.4)
FASTING STATUS PATIENT QL REPORTED: NORMAL
GLOBULIN SER CALC-MCNC: 2.1 G/DL (ref 1.9–3.5)
GLUCOSE SERPL-MCNC: 85 MG/DL (ref 65–99)
HDLC SERPL-MCNC: 90 MG/DL
LDLC SERPL CALC-MCNC: 113 MG/DL
POTASSIUM SERPL-SCNC: 4.4 MMOL/L (ref 3.6–5.5)
PROT SERPL-MCNC: 6.4 G/DL (ref 6–8.2)
SODIUM SERPL-SCNC: 138 MMOL/L (ref 135–145)
TRIGL SERPL-MCNC: 59 MG/DL (ref 0–149)
VIT B12 SERPL-MCNC: 1480 PG/ML (ref 211–911)

## 2019-04-03 PROCEDURE — 82607 VITAMIN B-12: CPT

## 2019-04-03 PROCEDURE — 36415 COLL VENOUS BLD VENIPUNCTURE: CPT

## 2019-04-03 PROCEDURE — 80053 COMPREHEN METABOLIC PANEL: CPT

## 2019-04-03 PROCEDURE — 80061 LIPID PANEL: CPT

## 2019-04-03 PROCEDURE — 82306 VITAMIN D 25 HYDROXY: CPT

## 2019-04-04 ENCOUNTER — TELEPHONE (OUTPATIENT)
Dept: MEDICAL GROUP | Facility: LAB | Age: 74
End: 2019-04-04

## 2019-04-04 NOTE — TELEPHONE ENCOUNTER
----- Message from Monae Beck M.D. sent at 4/4/2019 12:14 PM PDT -----  Please have patient make an appointment to discuss the results  Monae Beck M.D.

## 2019-04-04 NOTE — TELEPHONE ENCOUNTER
Phone Number Called: [phone] (home)     Message: [place] instructing patient to schedule an appointment to discuss lab results at providers request    Left Message for patient to call back: yes

## 2019-04-17 ENCOUNTER — HOSPITAL ENCOUNTER (OUTPATIENT)
Dept: LAB | Facility: MEDICAL CENTER | Age: 74
End: 2019-04-17
Attending: INTERNAL MEDICINE
Payer: MEDICARE

## 2019-04-17 ENCOUNTER — OFFICE VISIT (OUTPATIENT)
Dept: MEDICAL GROUP | Facility: LAB | Age: 74
End: 2019-04-17
Payer: MEDICARE

## 2019-04-17 VITALS
WEIGHT: 124 LBS | HEART RATE: 88 BPM | SYSTOLIC BLOOD PRESSURE: 112 MMHG | RESPIRATION RATE: 20 BRPM | DIASTOLIC BLOOD PRESSURE: 60 MMHG | HEIGHT: 60 IN | BODY MASS INDEX: 24.35 KG/M2 | OXYGEN SATURATION: 95 % | TEMPERATURE: 98.7 F

## 2019-04-17 DIAGNOSIS — Z12.31 ENCOUNTER FOR SCREENING MAMMOGRAM FOR BREAST CANCER: ICD-10-CM

## 2019-04-17 DIAGNOSIS — B07.0 PLANTAR WART OF RIGHT FOOT: ICD-10-CM

## 2019-04-17 DIAGNOSIS — L50.9 HIVES: ICD-10-CM

## 2019-04-17 DIAGNOSIS — N64.4 BREAST PAIN, LEFT: ICD-10-CM

## 2019-04-17 DIAGNOSIS — E78.5 DYSLIPIDEMIA: ICD-10-CM

## 2019-04-17 DIAGNOSIS — H61.22 EXCESSIVE EAR WAX, LEFT: ICD-10-CM

## 2019-04-17 LAB
T4 FREE SERPL-MCNC: 0.84 NG/DL (ref 0.53–1.43)
TSH SERPL DL<=0.005 MIU/L-ACNC: 3.15 UIU/ML (ref 0.38–5.33)

## 2019-04-17 PROCEDURE — 84443 ASSAY THYROID STIM HORMONE: CPT

## 2019-04-17 PROCEDURE — 84439 ASSAY OF FREE THYROXINE: CPT

## 2019-04-17 PROCEDURE — 36415 COLL VENOUS BLD VENIPUNCTURE: CPT

## 2019-04-17 PROCEDURE — 99214 OFFICE O/P EST MOD 30 MIN: CPT | Performed by: FAMILY MEDICINE

## 2019-04-17 RX ORDER — HYDROXYZINE HYDROCHLORIDE 25 MG/1
25 TABLET, FILM COATED ORAL
Qty: 30 TAB | Refills: 1 | Status: SHIPPED | OUTPATIENT
Start: 2019-04-17 | End: 2022-12-01 | Stop reason: SDUPTHER

## 2019-04-17 RX ORDER — HYDROXYZINE HYDROCHLORIDE 25 MG/1
25 TABLET, FILM COATED ORAL
Qty: 90 TAB | Refills: 0 | Status: SHIPPED | OUTPATIENT
Start: 2019-04-17 | End: 2019-04-17 | Stop reason: SDUPTHER

## 2019-04-17 RX ORDER — LEVOTHYROXINE SODIUM 0.12 MG/1
62.5 TABLET ORAL
COMMUNITY
End: 2021-11-03

## 2019-04-17 NOTE — PATIENT INSTRUCTIONS
Plantar Warts  Introduction  Warts are small growths on the skin. They can occur on various areas of the body. When they occur on the underside (sole) of the foot, they are called plantar warts. Plantar warts often occur in groups, with several small warts around a larger growth. They tend to develop over areas of pressure, such as the heel or the ball of the foot.  Most warts are not painful, and they usually do not cause problems. However, plantar warts may cause pain when you walk because pressure is applied to them. Warts often go away on their own in time. Various treatments may be done if needed. Sometimes, warts go away and then they come back again.  What are the causes?  Plantar warts are caused by a type of virus that is called human papillomavirus (HPV). HPV attacks a break in the skin of the foot. Walking barefoot can lead to exposure to the virus. These warts may spread to other areas of the sole. They spread to other areas of the body only through direct contact.  What increases the risk?  Plantar warts are more likely to develop in:  · People who are 10-20 years of age.  · People who use public showers or locker rooms.  · People who have a weakened body defense system (immune system).  What are the signs or symptoms?  Plantar warts may be flat or slightly raised. They may grow into the deeper layers of skin or rise above the surface of the skin. Most plantar warts have a rough surface. They may cause pain when you use your foot to support your body weight.  How is this diagnosed?  A plantar wart can usually be diagnosed from its appearance. In some cases, a tissue sample may be removed (biopsy) to be looked at under a microscope.  How is this treated?  In many cases, warts do not need treatment. Without treatment, they often go away over a period of many months to a couple years. If treatment is needed, options may include:  · Applying medicated solutions, creams, or patches to the wart. These may be  over-the-counter or prescription medicines that make the skin soft so that layers will gradually shed away. In many cases, the medicine is applied one or two times per day and covered with a bandage.  · Putting duct tape over the top of the wart (occlusion). You will leave the tape in place for as long as told by your health care provider, then you will replace it with a new strip of tape. This is done until the wart goes away.  · Freezing the wart with liquid nitrogen (cryotherapy).  · Burning the wart with:  ¨ Laser treatment.  ¨ An electrified probe (electrocautery).  · Injection of a medicine (Candida antigen) into the wart to help the body's immune system to fight off the wart.  · Surgery to remove the wart.  Follow these instructions at home:  · Apply medicated creams or solutions only as told by your health care provider. This may involve:  ¨ Soaking the affected area in warm water.  ¨ Removing the top layer of softened skin before you apply the medicine. A pumice stone works well for removing the tissue.  ¨ Applying a bandage over the affected area after you apply the medicine.  ¨ Repeating the process daily or as told by your health care provider.  · Do not scratch or pick at a wart.  · Wash your hands after you touch a wart.  · If a wart is painful, try applying a bandage with a hole in the middle over the wart. The helps to take pressure off the wart.  · Keep all follow-up visits as told by your health care provider. This is important.  How is this prevented?  Take these actions to help prevent warts:  · Wear shoes and socks. Change your socks daily.  · Keep your feet clean and dry.  · Check your feet regularly.  · Avoid direct contact with warts on other people.  Contact a health care provider if:  · Your warts do not improve after treatment.  · You have redness, swelling, or pain at the site of a wart.  · You have bleeding from a wart that does not stop with light pressure.  · You have diabetes and you  develop a wart.  This information is not intended to replace advice given to you by your health care provider. Make sure you discuss any questions you have with your health care provider.  Document Released: 03/09/2005 Document Revised: 05/25/2017 Document Reviewed: 03/14/2016  © 2017 [employer]

## 2019-04-17 NOTE — ASSESSMENT & PLAN NOTE
Breast pain has now resolved and she does not want to do the diagnostic mammogram.  She would like to have an annual mammogram.

## 2019-04-23 NOTE — PROGRESS NOTES
Subjective:     Chief Complaint   Patient presents with   • Follow-Up     breast pain   • Medication Refill     hydoxazine        Joie Hart is a 73 y.o. female here today for evaluation and management of:    Breast pain, left  Breast pain has now resolved and she does not want to do the diagnostic mammogram.  She would like to have an annual mammogram.    Dyslipidemia  Patient has a history of hyperlipidemia.  She has been managing it with diet and exercise.    Excessive ear wax, left  Patient complains of ear congestion in both ears left greater than right.  She has a history of wax buildup and would like this checked and taking care of.    Plantar wart of right foot  Patient's requesting a referral to podiatry for a lump on her right foot.  It hurts when she walks on it.  She denies any trauma to the area.       Allergies   Allergen Reactions   • Codeine Rash     Rash all over           Current medicines (including changes today)  Current Outpatient Prescriptions   Medication Sig Dispense Refill   • levothyroxine (SYNTHROID) 125 MCG Tab Take 125 mcg by mouth Every morning on an empty stomach.     • hydrOXYzine HCl (ATARAX) 25 MG Tab Take 1 Tab by mouth every 24 hours as needed for Itching. 30 Tab 1   • Magnesium Citrate 125 MG Cap Take  by mouth.     • amitriptyline (ELAVIL) 150 MG Tab TAKE 1 TABLET BY MOUTH AT BEDTIME AS NEEDED 90 Tab 1   • Magnesium 100 MG Cap Take  by mouth.     • Oral Electrolytes (EMERGEN-C ELECTRO MIX PO) Take  by mouth.     • cyanocobalamin (VITAMIN B-12) 500 MCG Tab Take 500 mcg by mouth every day.     • therapeutic multivitamin-minerals (THERAGRAN-M) Tab Take 1 Tab by mouth every day.     • Probiotic Product (PROBIOTIC DAILY PO) Take  by mouth.     • acetaminophen/caffeine/butalbital 325-40-50 mg (FIORICET) -40 MG Tab Take 1 Tab by mouth every 6 hours as needed for Headache for up to 90 days. 30 Tab 2   • acyclovir (ZOVIRAX) 800 MG Tab Take 1 Tab by mouth 2 times a day.  180 Tab 0     No current facility-administered medications for this visit.        She  has a past medical history of Allergy; Anemia; Anxiety; Cataract; Chronic back pain; Depression (2009); Diverticulitis; Glaucoma; Head injury, closed (3/6/16); HSV infection; IBD (inflammatory bowel disease); Migraine; Pelvic fracture (HCC) (1976); Spondylisthesis; Thyroid disease; and Vaginal vault prolapse (2006).    Patient Active Problem List    Diagnosis Date Noted   • Plantar wart of right foot 04/17/2019   • Excessive ear wax, left 04/17/2019   • Dyslipidemia 04/17/2019   • Breast pain, left 03/12/2019   • Chronic bilateral thoracic back pain 03/12/2019   • Vitamin D deficiency 03/12/2019   • Right flank pain 01/16/2019   • Acute midline low back pain without sciatica 01/11/2019   • Balance problem 01/11/2019   • Chest trauma 01/11/2019   • Rib pain on left side 12/12/2018   • Acute buttock pain 07/26/2018   • Pain of cervical spine 06/26/2018   • Acquired hypothyroidism 06/26/2018   • Chronic diarrhea of unknown origin 06/07/2018   • Hematuria 04/11/2018   • Pain of right upper extremity 03/27/2018   • Chronic pain syndrome 03/27/2018   • Abnormal CT of the abdomen 03/27/2018   • White matter abnormality on MRI of brain 01/09/2018   • Chronic headaches 10/05/2017   • Chronic non-specific white matter lesions on MRI 10/05/2017   • Chronic left-sided low back pain with sciatica 07/25/2017   • Seasonal allergic rhinitis due to pollen 05/26/2017   • B12 deficiency 12/20/2016   • Nonintractable headache 06/17/2016   • Spondylisthesis    • Acquired cyst of kidney 03/23/2016   • Lymphocytic thyroiditis 03/23/2016   • Herpes simplex virus (HSV) infection 11/07/2012   • Other hyperlipidemia 11/07/2012   • Chronic urticaria 11/07/2012   • Osteopenia 11/06/2012       ROS   No fever or chills.  No nausea or vomiting.  No chest pain or palpitations.  No cough or SOB.  No pain with urination or hematuria.  No black or bloody  stools.       Objective:     /60 (BP Location: Left arm, Patient Position: Sitting, BP Cuff Size: Adult)   Pulse 88   Temp 37.1 °C (98.7 °F) (Temporal)   Resp 20   Ht 1.524 m (5')   Wt 56.2 kg (124 lb)   SpO2 95%  Body mass index is 24.22 kg/m².   Physical Exam:  Well developed, well nourished.  Alert, oriented in no acute distress.  Eye contact is good, speech goal directed, affect calm  Eyes: conjunctiva non-injected, sclera non-icteric.  Ears: Pinna normal. TM pearly gray on the right but canal is obstructed on the left from wax  Neck Supple.  No adenopathy or masses in the neck or supraclavicular regions. No thyromegaly  Lungs: clear to auscultation bilaterally with good excursion. No wheezes or rhonchi  CV: regular rate and rhythm. No murmur  Right foot with verrucous lesion the fifth metacarpal of the plantar surface of the foot           Assessment and Plan:   The following treatment plan was discussed    1. Breast pain, left  Recommend proceeding with diagnostic mammogram and ultrasound but patient refuses.  She reports that the pain has subsided.  Proceed with screening mammogram    2. Encounter for screening mammogram for breast cancer    - MA-SCREENING MAMMO BILAT W/TOMOSYNTHESIS W/CAD; Future    3. Plantar wart of right foot  Refer to podiatry for further evaluation and treatment.  I did offer her cryotherapy today which she refused.  - REFERRAL TO PODIATRY    4. Excessive ear wax, left  Ear wash done with good effect    5. Hives  Refill Atarax for chronic hives.  Discussed that this can be dehydrating and increased risk of falls.  Increase fluid intake.    - hydrOXYzine HCl (ATARAX) 25 MG Tab; Take 1 Tab by mouth every 24 hours as needed for Itching.  Dispense: 30 Tab; Refill: 1    6. Dyslipidemia  Patient refuses medications.  Low-fat diet information given.  Continue to monitor.    Any change or worsening of signs or symptoms, patient encouraged to follow-up or report to the emergency room  for further evaluation. Patient understands and agrees.    Followup: Return in about 6 months (around 10/17/2019).

## 2019-04-23 NOTE — ASSESSMENT & PLAN NOTE
Patient's requesting a referral to podiatry for a lump on her right foot.  It hurts when she walks on it.  She denies any trauma to the area.   no dysuria, no frequency, and no hematuria.

## 2019-04-23 NOTE — ASSESSMENT & PLAN NOTE
Patient complains of ear congestion in both ears left greater than right.  She has a history of wax buildup and would like this checked and taking care of.

## 2019-04-26 ENCOUNTER — HOSPITAL ENCOUNTER (OUTPATIENT)
Dept: RADIOLOGY | Facility: MEDICAL CENTER | Age: 74
End: 2019-04-26
Attending: FAMILY MEDICINE
Payer: MEDICARE

## 2019-04-26 DIAGNOSIS — Z12.31 ENCOUNTER FOR SCREENING MAMMOGRAM FOR BREAST CANCER: ICD-10-CM

## 2019-04-26 PROCEDURE — 77063 BREAST TOMOSYNTHESIS BI: CPT

## 2019-06-18 ENCOUNTER — HOSPITAL ENCOUNTER (OUTPATIENT)
Facility: MEDICAL CENTER | Age: 74
End: 2019-06-19
Attending: EMERGENCY MEDICINE | Admitting: HOSPITALIST
Payer: MEDICARE

## 2019-06-18 ENCOUNTER — APPOINTMENT (OUTPATIENT)
Dept: RADIOLOGY | Facility: MEDICAL CENTER | Age: 74
End: 2019-06-18
Attending: EMERGENCY MEDICINE
Payer: MEDICARE

## 2019-06-18 DIAGNOSIS — E87.1 HYPONATREMIA: ICD-10-CM

## 2019-06-18 DIAGNOSIS — R07.89 CHEST PRESSURE: ICD-10-CM

## 2019-06-18 DIAGNOSIS — I10 HYPERTENSION, UNSPECIFIED TYPE: ICD-10-CM

## 2019-06-18 PROBLEM — R07.9 PAIN IN THE CHEST: Status: ACTIVE | Noted: 2019-06-18

## 2019-06-18 LAB
ALBUMIN SERPL BCP-MCNC: 4.1 G/DL (ref 3.2–4.9)
ALBUMIN/GLOB SERPL: 1.6 G/DL
ALP SERPL-CCNC: 77 U/L (ref 30–99)
ALT SERPL-CCNC: 23 U/L (ref 2–50)
ANION GAP SERPL CALC-SCNC: 9 MMOL/L (ref 0–11.9)
APPEARANCE UR: CLEAR
AST SERPL-CCNC: 25 U/L (ref 12–45)
BASOPHILS # BLD AUTO: 1 % (ref 0–1.8)
BASOPHILS # BLD: 0.05 K/UL (ref 0–0.12)
BILIRUB SERPL-MCNC: 0.5 MG/DL (ref 0.1–1.5)
BILIRUB UR QL STRIP.AUTO: NEGATIVE
BNP SERPL-MCNC: 24 PG/ML (ref 0–100)
BUN SERPL-MCNC: 7 MG/DL (ref 8–22)
CALCIUM SERPL-MCNC: 9 MG/DL (ref 8.4–10.2)
CHLORIDE SERPL-SCNC: 96 MMOL/L (ref 96–112)
CO2 SERPL-SCNC: 25 MMOL/L (ref 20–33)
COLOR UR: YELLOW
CREAT SERPL-MCNC: 0.68 MG/DL (ref 0.5–1.4)
D DIMER PPP IA.FEU-MCNC: <0.4 UG/ML (FEU) (ref 0–0.5)
EKG IMPRESSION: NORMAL
EKG IMPRESSION: NORMAL
EOSINOPHIL # BLD AUTO: 0.01 K/UL (ref 0–0.51)
EOSINOPHIL NFR BLD: 0.2 % (ref 0–6.9)
ERYTHROCYTE [DISTWIDTH] IN BLOOD BY AUTOMATED COUNT: 41.6 FL (ref 35.9–50)
GLOBULIN SER CALC-MCNC: 2.6 G/DL (ref 1.9–3.5)
GLUCOSE SERPL-MCNC: 100 MG/DL (ref 65–99)
GLUCOSE UR STRIP.AUTO-MCNC: NEGATIVE MG/DL
HCT VFR BLD AUTO: 39.6 % (ref 37–47)
HGB BLD-MCNC: 13.2 G/DL (ref 12–16)
IMM GRANULOCYTES # BLD AUTO: 0.01 K/UL (ref 0–0.11)
IMM GRANULOCYTES NFR BLD AUTO: 0.2 % (ref 0–0.9)
KETONES UR STRIP.AUTO-MCNC: NEGATIVE MG/DL
LEUKOCYTE ESTERASE UR QL STRIP.AUTO: NEGATIVE
LIPASE SERPL-CCNC: 43 U/L (ref 7–58)
LYMPHOCYTES # BLD AUTO: 2.01 K/UL (ref 1–4.8)
LYMPHOCYTES NFR BLD: 38.8 % (ref 22–41)
MCH RBC QN AUTO: 32.3 PG (ref 27–33)
MCHC RBC AUTO-ENTMCNC: 33.3 G/DL (ref 33.6–35)
MCV RBC AUTO: 96.8 FL (ref 81.4–97.8)
MICRO URNS: NORMAL
MONOCYTES # BLD AUTO: 0.51 K/UL (ref 0–0.85)
MONOCYTES NFR BLD AUTO: 9.8 % (ref 0–13.4)
NEUTROPHILS # BLD AUTO: 2.59 K/UL (ref 2–7.15)
NEUTROPHILS NFR BLD: 50 % (ref 44–72)
NITRITE UR QL STRIP.AUTO: NEGATIVE
NRBC # BLD AUTO: 0 K/UL
NRBC BLD-RTO: 0 /100 WBC
PH UR STRIP.AUTO: 7.5 [PH]
PLATELET # BLD AUTO: 187 K/UL (ref 164–446)
PMV BLD AUTO: 9.6 FL (ref 9–12.9)
POTASSIUM SERPL-SCNC: 4 MMOL/L (ref 3.6–5.5)
PROT SERPL-MCNC: 6.7 G/DL (ref 6–8.2)
PROT UR QL STRIP: NEGATIVE MG/DL
RBC # BLD AUTO: 4.09 M/UL (ref 4.2–5.4)
RBC UR QL AUTO: NEGATIVE
SODIUM SERPL-SCNC: 130 MMOL/L (ref 135–145)
SP GR UR STRIP.AUTO: <=1.005
T4 FREE SERPL-MCNC: 0.86 NG/DL (ref 0.58–1.64)
TROPONIN I SERPL-MCNC: <0.02 NG/ML (ref 0–0.04)
TROPONIN I SERPL-MCNC: <0.02 NG/ML (ref 0–0.04)
TSH SERPL DL<=0.005 MIU/L-ACNC: 3.77 UIU/ML (ref 0.38–5.33)
WBC # BLD AUTO: 5.2 K/UL (ref 4.8–10.8)

## 2019-06-18 PROCEDURE — 71045 X-RAY EXAM CHEST 1 VIEW: CPT

## 2019-06-18 PROCEDURE — 93010 ELECTROCARDIOGRAM REPORT: CPT | Performed by: INTERNAL MEDICINE

## 2019-06-18 PROCEDURE — 700102 HCHG RX REV CODE 250 W/ 637 OVERRIDE(OP): Performed by: HOSPITALIST

## 2019-06-18 PROCEDURE — 36415 COLL VENOUS BLD VENIPUNCTURE: CPT

## 2019-06-18 PROCEDURE — 700101 HCHG RX REV CODE 250: Performed by: HOSPITALIST

## 2019-06-18 PROCEDURE — 83880 ASSAY OF NATRIURETIC PEPTIDE: CPT

## 2019-06-18 PROCEDURE — 94760 N-INVAS EAR/PLS OXIMETRY 1: CPT

## 2019-06-18 PROCEDURE — 99219 PR INITIAL OBSERVATION CARE,LEVL II: CPT | Performed by: HOSPITALIST

## 2019-06-18 PROCEDURE — A9270 NON-COVERED ITEM OR SERVICE: HCPCS | Performed by: HOSPITALIST

## 2019-06-18 PROCEDURE — 81003 URINALYSIS AUTO W/O SCOPE: CPT

## 2019-06-18 PROCEDURE — G0378 HOSPITAL OBSERVATION PER HR: HCPCS

## 2019-06-18 PROCEDURE — 84484 ASSAY OF TROPONIN QUANT: CPT

## 2019-06-18 PROCEDURE — 99285 EMERGENCY DEPT VISIT HI MDM: CPT

## 2019-06-18 PROCEDURE — 84439 ASSAY OF FREE THYROXINE: CPT

## 2019-06-18 PROCEDURE — 84443 ASSAY THYROID STIM HORMONE: CPT

## 2019-06-18 PROCEDURE — 83690 ASSAY OF LIPASE: CPT

## 2019-06-18 PROCEDURE — 85025 COMPLETE CBC W/AUTO DIFF WBC: CPT

## 2019-06-18 PROCEDURE — 700111 HCHG RX REV CODE 636 W/ 250 OVERRIDE (IP): Performed by: HOSPITALIST

## 2019-06-18 PROCEDURE — 96372 THER/PROPH/DIAG INJ SC/IM: CPT

## 2019-06-18 PROCEDURE — 93005 ELECTROCARDIOGRAM TRACING: CPT | Performed by: EMERGENCY MEDICINE

## 2019-06-18 PROCEDURE — 93005 ELECTROCARDIOGRAM TRACING: CPT | Performed by: HOSPITALIST

## 2019-06-18 PROCEDURE — 80053 COMPREHEN METABOLIC PANEL: CPT

## 2019-06-18 PROCEDURE — 85379 FIBRIN DEGRADATION QUANT: CPT

## 2019-06-18 RX ORDER — AMITRIPTYLINE HYDROCHLORIDE 150 MG/1
150 TABLET ORAL NIGHTLY
COMMUNITY
End: 2019-10-31

## 2019-06-18 RX ORDER — LIDOCAINE 50 MG/G
1 PATCH TOPICAL EVERY 24 HOURS
Status: DISCONTINUED | OUTPATIENT
Start: 2019-06-18 | End: 2019-06-19 | Stop reason: HOSPADM

## 2019-06-18 RX ORDER — AMOXICILLIN 250 MG
2 CAPSULE ORAL 2 TIMES DAILY
Status: DISCONTINUED | OUTPATIENT
Start: 2019-06-18 | End: 2019-06-19 | Stop reason: HOSPADM

## 2019-06-18 RX ORDER — HYDROXYZINE HYDROCHLORIDE 25 MG/1
25 TABLET, FILM COATED ORAL
Status: DISCONTINUED | OUTPATIENT
Start: 2019-06-18 | End: 2019-06-19 | Stop reason: HOSPADM

## 2019-06-18 RX ORDER — REGADENOSON 0.08 MG/ML
0.4 INJECTION, SOLUTION INTRAVENOUS
Status: COMPLETED | OUTPATIENT
Start: 2019-06-18 | End: 2019-06-19

## 2019-06-18 RX ORDER — ASPIRIN 600 MG/1
300 SUPPOSITORY RECTAL DAILY
Status: DISCONTINUED | OUTPATIENT
Start: 2019-06-18 | End: 2019-06-19 | Stop reason: HOSPADM

## 2019-06-18 RX ORDER — ACETAMINOPHEN 500 MG
1000 TABLET ORAL EVERY 6 HOURS PRN
COMMUNITY
End: 2022-08-08

## 2019-06-18 RX ORDER — AMITRIPTYLINE HYDROCHLORIDE 50 MG/1
150 TABLET, FILM COATED ORAL NIGHTLY
Status: DISCONTINUED | OUTPATIENT
Start: 2019-06-18 | End: 2019-06-19 | Stop reason: HOSPADM

## 2019-06-18 RX ORDER — LEVOTHYROXINE SODIUM 0.12 MG/1
62.5 TABLET ORAL
Status: DISCONTINUED | OUTPATIENT
Start: 2019-06-19 | End: 2019-06-19 | Stop reason: HOSPADM

## 2019-06-18 RX ORDER — POLYETHYLENE GLYCOL 3350 17 G/17G
1 POWDER, FOR SOLUTION ORAL
Status: DISCONTINUED | OUTPATIENT
Start: 2019-06-18 | End: 2019-06-19 | Stop reason: HOSPADM

## 2019-06-18 RX ORDER — LORAZEPAM 2 MG/ML
0.5 INJECTION INTRAMUSCULAR
Status: DISCONTINUED | OUTPATIENT
Start: 2019-06-18 | End: 2019-06-19 | Stop reason: HOSPADM

## 2019-06-18 RX ORDER — ASPIRIN 81 MG/1
324 TABLET, CHEWABLE ORAL DAILY
Status: DISCONTINUED | OUTPATIENT
Start: 2019-06-18 | End: 2019-06-19 | Stop reason: HOSPADM

## 2019-06-18 RX ORDER — CHOLECALCIFEROL (VITAMIN D3) 125 MCG
500 CAPSULE ORAL DAILY
Status: DISCONTINUED | OUTPATIENT
Start: 2019-06-18 | End: 2019-06-19 | Stop reason: HOSPADM

## 2019-06-18 RX ORDER — AMINOPHYLLINE 25 MG/ML
100 INJECTION, SOLUTION INTRAVENOUS
Status: DISCONTINUED | OUTPATIENT
Start: 2019-06-18 | End: 2019-06-19 | Stop reason: HOSPADM

## 2019-06-18 RX ORDER — ASPIRIN 325 MG
325 TABLET ORAL DAILY
Status: DISCONTINUED | OUTPATIENT
Start: 2019-06-18 | End: 2019-06-19 | Stop reason: HOSPADM

## 2019-06-18 RX ORDER — ACETAMINOPHEN 325 MG/1
650 TABLET ORAL EVERY 6 HOURS PRN
Status: DISCONTINUED | OUTPATIENT
Start: 2019-06-18 | End: 2019-06-19 | Stop reason: HOSPADM

## 2019-06-18 RX ORDER — ACYCLOVIR 200 MG/1
800 CAPSULE ORAL 2 TIMES DAILY
Status: DISCONTINUED | OUTPATIENT
Start: 2019-06-18 | End: 2019-06-19 | Stop reason: HOSPADM

## 2019-06-18 RX ORDER — TRAMADOL HYDROCHLORIDE 50 MG/1
50 TABLET ORAL EVERY 8 HOURS PRN
COMMUNITY
End: 2021-11-03 | Stop reason: SDUPTHER

## 2019-06-18 RX ORDER — BISACODYL 10 MG
10 SUPPOSITORY, RECTAL RECTAL
Status: DISCONTINUED | OUTPATIENT
Start: 2019-06-18 | End: 2019-06-19 | Stop reason: HOSPADM

## 2019-06-18 RX ADMIN — ENOXAPARIN SODIUM 40 MG: 100 INJECTION SUBCUTANEOUS at 14:21

## 2019-06-18 RX ADMIN — ASPIRIN 325 MG ORAL TABLET 325 MG: 325 PILL ORAL at 14:21

## 2019-06-18 RX ADMIN — ACETAMINOPHEN 650 MG: 325 TABLET, FILM COATED ORAL at 14:22

## 2019-06-18 RX ADMIN — LIDOCAINE 1 PATCH: 50 PATCH TOPICAL at 17:09

## 2019-06-18 RX ADMIN — AMITRIPTYLINE HYDROCHLORIDE 150 MG: 50 TABLET, FILM COATED ORAL at 21:10

## 2019-06-18 ASSESSMENT — LIFESTYLE VARIABLES
EVER_SMOKED: YES
DOES PATIENT WANT TO STOP DRINKING: YES
EVER FELT BAD OR GUILTY ABOUT YOUR DRINKING: YES
AVERAGE NUMBER OF DAYS PER WEEK YOU HAVE A DRINK CONTAINING ALCOHOL: 9
TOTAL SCORE: 2
ON A TYPICAL DAY WHEN YOU DRINK ALCOHOL HOW MANY DRINKS DO YOU HAVE: 1
HAVE PEOPLE ANNOYED YOU BY CRITICIZING YOUR DRINKING: NO
HAVE YOU EVER FELT YOU SHOULD CUT DOWN ON YOUR DRINKING: YES
TOTAL SCORE: 2
ALCOHOL_USE: YES
TOTAL SCORE: 2
DOES PATIENT WANT TO TALK TO SOMEONE ABOUT QUITTING: NO
EVER HAD A DRINK FIRST THING IN THE MORNING TO STEADY YOUR NERVES TO GET RID OF A HANGOVER: NO
HOW MANY TIMES IN THE PAST YEAR HAVE YOU HAD 5 OR MORE DRINKS IN A DAY: 2
CONSUMPTION TOTAL: POSITIVE

## 2019-06-18 ASSESSMENT — COGNITIVE AND FUNCTIONAL STATUS - GENERAL
DAILY ACTIVITIY SCORE: 24
SUGGESTED CMS G CODE MODIFIER MOBILITY: CH
MOBILITY SCORE: 24
SUGGESTED CMS G CODE MODIFIER DAILY ACTIVITY: CH

## 2019-06-18 ASSESSMENT — ENCOUNTER SYMPTOMS
SPEECH CHANGE: 0
BACK PAIN: 0
NERVOUS/ANXIOUS: 1
MYALGIAS: 0
SENSORY CHANGE: 0
SHORTNESS OF BREATH: 0
VOMITING: 0
PND: 0
TINGLING: 0
HEADACHES: 0
WEAKNESS: 0
FEVER: 0
HEARTBURN: 0
SPUTUM PRODUCTION: 0
TREMORS: 0
STRIDOR: 0
EYE PAIN: 0
DIZZINESS: 0
CHILLS: 0
BLOOD IN STOOL: 0
BLURRED VISION: 0
DEPRESSION: 0
PHOTOPHOBIA: 0
CONSTIPATION: 0
COUGH: 0
DOUBLE VISION: 0
HEMOPTYSIS: 0
CLAUDICATION: 0
MEMORY LOSS: 0
PALPITATIONS: 0
ORTHOPNEA: 0
NECK PAIN: 0
SORE THROAT: 0
NAUSEA: 0

## 2019-06-18 ASSESSMENT — PATIENT HEALTH QUESTIONNAIRE - PHQ9
7. TROUBLE CONCENTRATING ON THINGS, SUCH AS READING THE NEWSPAPER OR WATCHING TELEVISION: MORE THAN HALF THE DAYS
1. LITTLE INTEREST OR PLEASURE IN DOING THINGS: MORE THAN HALF THE DAYS
9. THOUGHTS THAT YOU WOULD BE BETTER OFF DEAD, OR OF HURTING YOURSELF: NOT AT ALL
SUM OF ALL RESPONSES TO PHQ QUESTIONS 1-9: 11
2. FEELING DOWN, DEPRESSED, IRRITABLE, OR HOPELESS: NEARLY EVERY DAY
SUM OF ALL RESPONSES TO PHQ9 QUESTIONS 1 AND 2: 5
8. MOVING OR SPEAKING SO SLOWLY THAT OTHER PEOPLE COULD HAVE NOTICED. OR THE OPPOSITE, BEING SO FIGETY OR RESTLESS THAT YOU HAVE BEEN MOVING AROUND A LOT MORE THAN USUAL: NOT AT ALL
4. FEELING TIRED OR HAVING LITTLE ENERGY: MORE THAN HALF THE DAYS
6. FEELING BAD ABOUT YOURSELF - OR THAT YOU ARE A FAILURE OR HAVE LET YOURSELF OR YOUR FAMILY DOWN: MORE THAN HALF THE DAYS
5. POOR APPETITE OR OVEREATING: NOT AT ALL
3. TROUBLE FALLING OR STAYING ASLEEP OR SLEEPING TOO MUCH: NOT AT ALL

## 2019-06-18 NOTE — ED NOTES
Floor called and notified of pt transfer to floor. Chitra RN to call with any questions. Pt leaves this ER with no acute changes.

## 2019-06-18 NOTE — PROGRESS NOTES
· 2 RN skin check complete with LEXX Rossi.   · Devices in place: IVs, Tele monitor.  · Skin assessed under devices: yes  · Confirmed pressure ulcers found on: None  · New potential pressure ulcers noted on N/A.   · Skin intact    The following interventions in place: Pt can turn self independently.
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
## 2019-06-18 NOTE — PROGRESS NOTES
Pt arrives to unit from ER to unit via gurney. Ambulated from gurney to bed, accompanied by staff. Pt A&Ox4, No c/o pain, slight chest pressure. Tele monitor applied, vitals taken. History, allergies, and med rec reviewed and admission profile completed.     Pt oriented to unit and plan of care discussed, including stress test, diet, medications. Welcome folder provided and discussed. Communication board filled out. Pt instructed on call light usage and encouraged to call for any questions, needs, or concerns and prior to getting out of bed. Fall precautions in place. Pt agrees with the plan and declines any needs at this time. Bed locked and low.

## 2019-06-18 NOTE — ASSESSMENT & PLAN NOTE
2/2 to either dehydration or excess fluid intake  Will continue to watch off IV fluids  Bmp in the am

## 2019-06-18 NOTE — ASSESSMENT & PLAN NOTE
Rule out cardiac cause.  Initial EKG and troponin's are negative, we will continue to trend serial troponins, If negative we will proceed with a NM Stress test for risk stratification.  We will monitor patient on telemetry,  Aspirin has been ordered.  TSH Pending  Lipid panel ordered and pending
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
## 2019-06-18 NOTE — ED NOTES
Med rec updated and complete  Allergies reviewed.  Pt reports no antibiotics in the last 2 weeks.

## 2019-06-18 NOTE — ED TRIAGE NOTES
Chief Complaint   Patient presents with   • Shortness of Breath   • Dizziness   • Joint Pain   • Blood Pressure Problem   • Chest Pressure     Pt reports that she has had above symptoms x 1 week. Pt reports that she recently stopped drinking. States that she has been feeling very dehydrated.   States multiple other symptoms. Pt very anxious in triage. EKG called to triage.  BP (!) 162/104   Pulse 87   Temp 35.9 °C (96.6 °F) (Temporal)   Resp 18   Ht 1.524 m (5')   Wt 57.1 kg (125 lb 14.1 oz)   SpO2 99%   Pt informed of wait times. Educated on triage process.  Asked to return to triage RN for any new or worsening of symptoms. Thanked for patience.

## 2019-06-18 NOTE — CARE PLAN
Problem: Safety  Goal: Will remain free from falls  Outcome: PROGRESSING AS EXPECTED  Reinforced fall risk precautions. Non-skid socks on feet, call light in reach. Independent to the bathroom.      Problem: Knowledge Deficit  Goal: Knowledge of disease process/condition, treatment plan, diagnostic tests, and medications will improve  Outcome: PROGRESSING AS EXPECTED  Discuss POC with Pt. Encourage patient to ask questions and be involved in plan of care. Assess Pt's knowledge of disease process, treatment plan, diagnostic test, labs, and medications; educate, and give information as needed.

## 2019-06-19 ENCOUNTER — PATIENT OUTREACH (OUTPATIENT)
Dept: HEALTH INFORMATION MANAGEMENT | Facility: OTHER | Age: 74
End: 2019-06-19

## 2019-06-19 ENCOUNTER — APPOINTMENT (OUTPATIENT)
Dept: RADIOLOGY | Facility: MEDICAL CENTER | Age: 74
End: 2019-06-19
Attending: HOSPITALIST
Payer: MEDICARE

## 2019-06-19 VITALS
BODY MASS INDEX: 26.97 KG/M2 | HEIGHT: 60 IN | OXYGEN SATURATION: 97 % | HEART RATE: 91 BPM | RESPIRATION RATE: 18 BRPM | SYSTOLIC BLOOD PRESSURE: 119 MMHG | TEMPERATURE: 98 F | DIASTOLIC BLOOD PRESSURE: 62 MMHG | WEIGHT: 137.35 LBS

## 2019-06-19 LAB
ALBUMIN SERPL BCP-MCNC: 3.7 G/DL (ref 3.2–4.9)
ALBUMIN/GLOB SERPL: 1.5 G/DL
ALP SERPL-CCNC: 68 U/L (ref 30–99)
ALT SERPL-CCNC: 20 U/L (ref 2–50)
ANION GAP SERPL CALC-SCNC: 7 MMOL/L (ref 0–11.9)
AST SERPL-CCNC: 20 U/L (ref 12–45)
BASOPHILS # BLD AUTO: 1 % (ref 0–1.8)
BASOPHILS # BLD: 0.06 K/UL (ref 0–0.12)
BILIRUB SERPL-MCNC: 0.5 MG/DL (ref 0.1–1.5)
BUN SERPL-MCNC: 10 MG/DL (ref 8–22)
CALCIUM SERPL-MCNC: 9 MG/DL (ref 8.4–10.2)
CHLORIDE SERPL-SCNC: 104 MMOL/L (ref 96–112)
CHOLEST SERPL-MCNC: 204 MG/DL (ref 100–199)
CO2 SERPL-SCNC: 25 MMOL/L (ref 20–33)
CREAT SERPL-MCNC: 0.7 MG/DL (ref 0.5–1.4)
EOSINOPHIL # BLD AUTO: 0.01 K/UL (ref 0–0.51)
EOSINOPHIL NFR BLD: 0.2 % (ref 0–6.9)
ERYTHROCYTE [DISTWIDTH] IN BLOOD BY AUTOMATED COUNT: 41.3 FL (ref 35.9–50)
GLOBULIN SER CALC-MCNC: 2.4 G/DL (ref 1.9–3.5)
GLUCOSE SERPL-MCNC: 117 MG/DL (ref 65–99)
HCT VFR BLD AUTO: 38.2 % (ref 37–47)
HDLC SERPL-MCNC: 82 MG/DL
HGB BLD-MCNC: 12.7 G/DL (ref 12–16)
IMM GRANULOCYTES # BLD AUTO: 0.01 K/UL (ref 0–0.11)
IMM GRANULOCYTES NFR BLD AUTO: 0.2 % (ref 0–0.9)
LDLC SERPL CALC-MCNC: 95 MG/DL
LYMPHOCYTES # BLD AUTO: 2.72 K/UL (ref 1–4.8)
LYMPHOCYTES NFR BLD: 47 % (ref 22–41)
MCH RBC QN AUTO: 32.1 PG (ref 27–33)
MCHC RBC AUTO-ENTMCNC: 33.2 G/DL (ref 33.6–35)
MCV RBC AUTO: 96.5 FL (ref 81.4–97.8)
MONOCYTES # BLD AUTO: 0.51 K/UL (ref 0–0.85)
MONOCYTES NFR BLD AUTO: 8.8 % (ref 0–13.4)
NEUTROPHILS # BLD AUTO: 2.48 K/UL (ref 2–7.15)
NEUTROPHILS NFR BLD: 42.8 % (ref 44–72)
NRBC # BLD AUTO: 0 K/UL
NRBC BLD-RTO: 0 /100 WBC
PLATELET # BLD AUTO: 188 K/UL (ref 164–446)
PMV BLD AUTO: 9.8 FL (ref 9–12.9)
POTASSIUM SERPL-SCNC: 3.9 MMOL/L (ref 3.6–5.5)
PROT SERPL-MCNC: 6.1 G/DL (ref 6–8.2)
RBC # BLD AUTO: 3.96 M/UL (ref 4.2–5.4)
SODIUM SERPL-SCNC: 136 MMOL/L (ref 135–145)
TRIGL SERPL-MCNC: 133 MG/DL (ref 0–149)
WBC # BLD AUTO: 5.8 K/UL (ref 4.8–10.8)

## 2019-06-19 PROCEDURE — 700111 HCHG RX REV CODE 636 W/ 250 OVERRIDE (IP): Performed by: HOSPITALIST

## 2019-06-19 PROCEDURE — 85025 COMPLETE CBC W/AUTO DIFF WBC: CPT

## 2019-06-19 PROCEDURE — 78452 HT MUSCLE IMAGE SPECT MULT: CPT | Mod: 26 | Performed by: INTERNAL MEDICINE

## 2019-06-19 PROCEDURE — 99217 PR OBSERVATION CARE DISCHARGE: CPT | Performed by: INTERNAL MEDICINE

## 2019-06-19 PROCEDURE — 93018 CV STRESS TEST I&R ONLY: CPT | Performed by: INTERNAL MEDICINE

## 2019-06-19 PROCEDURE — G0378 HOSPITAL OBSERVATION PER HR: HCPCS

## 2019-06-19 PROCEDURE — 80053 COMPREHEN METABOLIC PANEL: CPT

## 2019-06-19 PROCEDURE — 700111 HCHG RX REV CODE 636 W/ 250 OVERRIDE (IP)

## 2019-06-19 PROCEDURE — 80061 LIPID PANEL: CPT

## 2019-06-19 PROCEDURE — A9502 TC99M TETROFOSMIN: HCPCS

## 2019-06-19 PROCEDURE — A9270 NON-COVERED ITEM OR SERVICE: HCPCS | Performed by: HOSPITALIST

## 2019-06-19 PROCEDURE — 96372 THER/PROPH/DIAG INJ SC/IM: CPT

## 2019-06-19 PROCEDURE — 700102 HCHG RX REV CODE 250 W/ 637 OVERRIDE(OP): Performed by: HOSPITALIST

## 2019-06-19 RX ORDER — LIDOCAINE 50 MG/G
1 PATCH TOPICAL EVERY 24 HOURS
Qty: 10 PATCH | Refills: 1 | Status: SHIPPED | OUTPATIENT
Start: 2019-06-19 | End: 2019-10-31

## 2019-06-19 RX ORDER — REGADENOSON 0.08 MG/ML
INJECTION, SOLUTION INTRAVENOUS
Status: COMPLETED
Start: 2019-06-19 | End: 2019-06-19

## 2019-06-19 RX ADMIN — REGADENOSON 0.4 MG: 0.08 INJECTION, SOLUTION INTRAVENOUS at 09:33

## 2019-06-19 RX ADMIN — ASPIRIN 325 MG ORAL TABLET 325 MG: 325 PILL ORAL at 06:06

## 2019-06-19 RX ADMIN — ENOXAPARIN SODIUM 40 MG: 100 INJECTION SUBCUTANEOUS at 06:07

## 2019-06-19 RX ADMIN — LEVOTHYROXINE SODIUM 62.5 MCG: 125 TABLET ORAL at 06:07

## 2019-06-19 RX ADMIN — CYANOCOBALAMIN TAB 500 MCG 500 MCG: 500 TAB at 06:06

## 2019-06-19 NOTE — PROGRESS NOTES
Telemetry Shift Summary    Rhythm SR  HR Range 72-87  Ectopy No ectopy  Measurements 0.16/0.08/0.36        Normal Values  Rhythm SR  HR Range    Measurements 0.12-0.20 / 0.06-0.10  / 0.30-0.52

## 2019-06-19 NOTE — CARE PLAN
Problem: Safety  Goal: Will remain free from injury  Outcome: PROGRESSING AS EXPECTED  Pt encouraged to call for assistance as needed. Safety precautions in place. Regular rounding.    Problem: Psychosocial Needs:  Goal: Level of anxiety will decrease  Outcome: PROGRESSING AS EXPECTED  Pt reassured and plan of care discussed. Meds offered per MAR.

## 2019-06-19 NOTE — PROGRESS NOTES
Patient presents to NM suite for cardiac stress test with MPI. Nursing goals identified: knowledge deficit, potential for anxiety r/t stress test, potential for compromised cardiac output. Care plan includes educating patient, reassurance and access to ACLS cart/team. Labs and ECG reviewed. No caffeine and NPO confirmed. Resting images attained and patient prepped for pharmacological stress study. Lexiscan given while patient ambulated on TM x 2 mins. Patient reported these symptoms: LEG HEAVINESS, SOB, FLUSHING. DENIED CP. Caffeinated beverage provided. Symptoms resolved.

## 2019-06-19 NOTE — PROGRESS NOTES
Down to stress test via wheelchair. Breakfast ordered and waiting for her after test at patient request.

## 2019-06-19 NOTE — DISCHARGE INSTRUCTIONS
Chest Pain, Nonspecific  It is often hard to give a specific diagnosis for the cause of chest pain. There is always a chance that your pain could be related to something serious, like a heart attack or a blood clot in the lungs. You need to follow up with your caregiver for further evaluation. More lab tests or other studies such as X-rays, electrocardiography, stress testing, or cardiac imaging may be needed to find the cause of your pain.  Most of the time, nonspecific chest pain improves within 2 to 3 days with rest and mild pain medicine. For the next few days, avoid physical exertion or activities that bring on pain. Do not smoke. Avoid drinking alcohol. Call your caregiver for routine follow-up as advised.   SEEK IMMEDIATE MEDICAL CARE IF:  · You develop increased chest pain or pain that radiates to the arm, neck, jaw, back, or abdomen.   · You develop shortness of breath, increased coughing, or you start coughing up blood.   · You have severe back or abdominal pain, nausea, or vomiting.   · You develop severe weakness, fainting, fever, or chills.   Document Released: 12/18/2006 Document Revised: 03/11/2013 Document Reviewed: 06/06/2008  [email or web address]® Patient Information ©2013 [employer].    Discharge Instructions    Discharged to home by car with relative. Discharged via walking, hospital escort: Yes.  Special equipment needed: Not Applicable    Be sure to schedule a follow-up appointment with your primary care doctor or any specialists as instructed.     Discharge Plan:   Diet Plan: Discussed  Activity Level: Discussed  Confirmed Follow up Appointment: Appointment Scheduled  Confirmed Symptoms Management: Discussed  Medication Reconciliation Updated: Yes  Pneumococcal Vaccine Administered/Refused: Not given - Patient refused pneumococcal vaccine (patient will think about it but is not sure, please check back in with her about it)  Influenza Vaccine Indication: Patient Refuses    I understand that a diet low  in cholesterol, fat, and sodium is recommended for good health. Unless I have been given specific instructions below for another diet, I accept this instruction as my diet prescription.   Other diet: regular    Special Instructions: None    · Is patient discharged on Warfarin / Coumadin?   No     Depression / Suicide Risk    As you are discharged from this [facility] facility, it is important to learn how to keep safe from harming yourself.    Recognize the warning signs:  · Abrupt changes in personality, positive or negative- including increase in energy   · Giving away possessions  · Change in eating patterns- significant weight changes-  positive or negative  · Change in sleeping patterns- unable to sleep or sleeping all the time   · Unwillingness or inability to communicate  · Depression  · Unusual sadness, discouragement and loneliness  · Talk of wanting to die  · Neglect of personal appearance   · Rebelliousness- reckless behavior  · Withdrawal from people/activities they love  · Confusion- inability to concentrate     If you or a loved one observes any of these behaviors or has concerns about self-harm, here's what you can do:  · Talk about it- your feelings and reasons for harming yourself  · Remove any means that you might use to hurt yourself (examples: pills, rope, extension cords, firearm)  · Get professional help from the community (Mental Health, Substance Abuse, psychological counseling)  · Do not be alone:Call your Safe Contact- someone whom you trust who will be there for you.  · Call your local CRISIS HOTLINE 932-9047 or 409-742-9126  · Call your local Children's Mobile Crisis Response Team [place] (043) 437-2310 or www.[employer]Crunch  · Call the toll free National Suicide Prevention Hotlines   · National Suicide Prevention Lifeline 438-238-UYMI (8889)  · National Hope Line Network 800-SUICIDE (264-5496)

## 2019-06-19 NOTE — PROGRESS NOTES
Discharge order written. IV removed, patient tolerated well. Tele removed and returned to monitor tech. Belongings gathered. Pt states that all personal belongings are in possession. AVS printed, reviewed and copy signed and placed on the chart. Patient has no further questions. Prescriptions sent to pharmacy. Discharged in satisfactory condition home with . Pt off unit via walking, escorted by JOSE Hernandez

## 2019-06-19 NOTE — PROGRESS NOTES
Bedside report received from Chitra HALLMAN. Plan of care discussed. Pt resting in bed with safety precautions in place.

## 2019-06-19 NOTE — PROGRESS NOTES
"Patient reports feeling very upset, crying, concerned something is wrong, feeling of panic. Also reports having a \"very bad phone call\". BP elevated. No change on tele. MD notified. PRN ativan ordered. Patient's daughter in law arrived, spoke to patient and daughter about plan of care and together we were able to help patient feel more calm. PM nurse aware.   "

## 2019-06-19 NOTE — PROGRESS NOTES
Telemetry Shift Summary    Rhythm SR  HR Range 70-80s  Ectopy none  Measurements .12/.06/.38        Normal Values  Rhythm SR  HR Range    Measurements 0.12-0.20 / 0.06-0.10  / 0.30-0.52

## 2019-06-19 NOTE — DISCHARGE PLANNING
Request for PAR for  Lidoderm.  Used Zovirax usage and Chronic back pain . Spoke With [id number] ID pt [id number] .  PAR [id number]

## 2019-06-20 NOTE — PROGRESS NOTES
Telemetry Shift Summary    Rhythm SR/ST  HR Range 70-100s  Ectopy Rare PVCs  Measurements .14/.08/.40        Normal Values  Rhythm SR  HR Range    Measurements 0.12-0.20 / 0.06-0.10  / 0.30-0.52

## 2019-06-24 ENCOUNTER — OFFICE VISIT (OUTPATIENT)
Dept: MEDICAL GROUP | Facility: LAB | Age: 74
End: 2019-06-24
Payer: MEDICARE

## 2019-06-24 VITALS
HEIGHT: 60 IN | HEART RATE: 88 BPM | SYSTOLIC BLOOD PRESSURE: 118 MMHG | WEIGHT: 123.6 LBS | BODY MASS INDEX: 24.26 KG/M2 | TEMPERATURE: 97.6 F | DIASTOLIC BLOOD PRESSURE: 68 MMHG | OXYGEN SATURATION: 96 %

## 2019-06-24 DIAGNOSIS — R07.9 CHEST PAIN, UNSPECIFIED TYPE: ICD-10-CM

## 2019-06-24 DIAGNOSIS — F41.9 ANXIETY: ICD-10-CM

## 2019-06-24 PROCEDURE — 99214 OFFICE O/P EST MOD 30 MIN: CPT | Performed by: FAMILY MEDICINE

## 2019-06-24 NOTE — ASSESSMENT & PLAN NOTE
Patient was hospitalized and had a full cardiac workup that was negative.  NM STress test was negative.  She feels that this was all a panic attack.  Her BP went up and then the chest pain started.  She has not had any further chest pain or shortness of breath.  She has been checking her blood pressures and they have been in the normal range.  She denies any swelling of her feet, shortness of breath or headaches.

## 2019-06-25 NOTE — PROGRESS NOTES
Subjective:     Chief Complaint   Patient presents with   • Follow-Up       Joie Hart is a 74 y.o. female here today for evaluation and management of:    Pain in the chest  Patient was hospitalized and had a full cardiac workup that was negative.  NM STress test was negative.  She feels that this was all a panic attack.  Her BP went up and then the chest pain started.  She has not had any further chest pain or shortness of breath.  She has been checking her blood pressures and they have been in the normal range.  She denies any swelling of her feet, shortness of breath or headaches.       Allergies   Allergen Reactions   • Codeine Rash     Rash all over           Current medicines (including changes today)  Current Outpatient Prescriptions   Medication Sig Dispense Refill   • lidocaine (LIDODERM) 5 % Patch Apply 1 Patch to skin as directed every 24 hours. 10 Patch 1   • amitriptyline (ELAVIL) 150 MG Tab Take 150 mg by mouth every evening.     • MAGNESIUM PO Take 1 Cap by mouth every day.     • acetaminophen (TYLENOL) 500 MG Tab Take 1,000 mg by mouth every 6 hours as needed for Moderate Pain.     • tramadol (ULTRAM) 50 MG Tab Take 50 mg by mouth every 8 hours as needed for Moderate Pain.     • levothyroxine (SYNTHROID) 125 MCG Tab Take 62.5 mcg by mouth Every morning on an empty stomach.     • hydrOXYzine HCl (ATARAX) 25 MG Tab Take 1 Tab by mouth every 24 hours as needed for Itching. 30 Tab 1   • acyclovir (ZOVIRAX) 800 MG Tab Take 1 Tab by mouth 2 times a day. (Patient taking differently: Take 800 mg by mouth 2 times a day as needed.) 180 Tab 0   • cyanocobalamin (VITAMIN B-12) 500 MCG Tab Take 500 mcg by mouth every day.     • therapeutic multivitamin-minerals (THERAGRAN-M) Tab Take 1 Tab by mouth every day.     • Probiotic Product (PROBIOTIC DAILY PO) Take 2 Caps by mouth every evening.       No current facility-administered medications for this visit.        She  has a past medical history of  Allergy; Anemia; Anxiety; Cataract; Chronic back pain; Depression (2009); Diverticulitis; Glaucoma; Head injury, closed (3/6/16); HSV infection; IBD (inflammatory bowel disease); Migraine; Pelvic fracture (HCC) (1976); Spondylisthesis; Thyroid disease; and Vaginal vault prolapse (2006).    Patient Active Problem List    Diagnosis Date Noted   • Anxiety 06/24/2019   • Hyponatremia 06/18/2019   • Pain in the chest 06/18/2019   • Plantar wart of right foot 04/17/2019   • Excessive ear wax, left 04/17/2019   • Dyslipidemia 04/17/2019   • Breast pain, left 03/12/2019   • Chronic bilateral thoracic back pain 03/12/2019   • Vitamin D deficiency 03/12/2019   • Right flank pain 01/16/2019   • Acute midline low back pain without sciatica 01/11/2019   • Balance problem 01/11/2019   • Chest trauma 01/11/2019   • Rib pain on left side 12/12/2018   • Acute buttock pain 07/26/2018   • Pain of cervical spine 06/26/2018   • Acquired hypothyroidism 06/26/2018   • Chronic diarrhea of unknown origin 06/07/2018   • Hematuria 04/11/2018   • Pain of right upper extremity 03/27/2018   • Chronic pain syndrome 03/27/2018   • Abnormal CT of the abdomen 03/27/2018   • White matter abnormality on MRI of brain 01/09/2018   • Chronic headaches 10/05/2017   • Chronic non-specific white matter lesions on MRI 10/05/2017   • Chronic left-sided low back pain with sciatica 07/25/2017   • Seasonal allergic rhinitis due to pollen 05/26/2017   • B12 deficiency 12/20/2016   • Nonintractable headache 06/17/2016   • Spondylisthesis    • Acquired cyst of kidney 03/23/2016   • Lymphocytic thyroiditis 03/23/2016   • Herpes simplex virus (HSV) infection 11/07/2012   • Other hyperlipidemia 11/07/2012   • Chronic urticaria 11/07/2012   • Osteopenia 11/06/2012       ROS   No fever or chills.  No nausea or vomiting.  No palpitations.  No cough or SOB.  No pain with urination or hematuria.  No black or bloody stools.       Objective:     /68 (BP Location: Left  arm, Patient Position: Sitting)   Pulse 88   Temp 36.4 °C (97.6 °F) (Temporal)   Ht 1.524 m (5')   Wt 56.1 kg (123 lb 9.6 oz)   SpO2 96%  Body mass index is 24.14 kg/m².   Physical Exam:  Well developed, well nourished.  Alert, oriented in no acute distress.  Eye contact is good, speech goal directed, affect calm  Eyes: conjunctiva non-injected, sclera non-icteric.  Neck Supple.  No adenopathy or masses in the neck or supraclavicular regions. No thyromegaly  Lungs: clear to auscultation bilaterally with good excursion. No wheezes or rhonchi  CV: regular rate and rhythm. No murmur  Ext: no edema, color normal, vascularity normal, temperature normal          Assessment and Plan:   The following treatment plan was discussed    1. Chest pain, unspecified type  Most likely secondary to anxiety.  Negative cardiac work-up.  Discussed stress reduction techniques.    2. Anxiety  Information given on mindfulness meditation and its positive effect on anxiety.  Increase exercise.  Patient does not want medication at this time.    Any change or worsening of signs or symptoms, patient encouraged to follow-up or report to the emergency room for further evaluation. Patient understands and agrees.    Followup: Return if symptoms worsen or fail to improve.

## 2019-06-25 NOTE — DISCHARGE SUMMARY
Discharge Summary    CHIEF COMPLAINT ON ADMISSION  Chief Complaint   Patient presents with   • Shortness of Breath   • Dizziness   • Joint Pain   • Blood Pressure Problem   • Chest Pressure       Reason for Admission  Other     Admission Date  6/18/2019    CODE STATUS  Prior    HPI & HOSPITAL COURSE  This is a 74 y.o. female here with chest pain, dyspnea, elevated blood pressure.  She had mild to moderate dyslipidemia, laboratory studies additionally revealed slight elevation of her glucose at 117.  Troponins were negative, d-dimer was negative, stress testing was negative.  Blood pressures improved and her chest pain additionally improved however she was noted to be extremely anxious.  Chest pain was atypical in nature when she described it to me.  She did admit to being somewhat anxious overall       Therefore, she is discharged in good and stable condition to home with close outpatient follow-up.    Discharge Date  6/19/2019    FOLLOW UP ITEMS POST DISCHARGE  PCP    DISCHARGE DIAGNOSES  Principal Problem:    Pain in the chest POA: Unknown  Active Problems:    Acquired hypothyroidism POA: Yes    Hyponatremia POA: Unknown  Resolved Problems:    * No resolved hospital problems. *      FOLLOW UP  No future appointments.  Monae Beck M.D.  [address] [id number] [phone]            MEDICATIONS ON DISCHARGE     Medication List      START taking these medications      Instructions   lidocaine 5 % Ptch  Commonly known as:  LIDODERM   Apply 1 Patch to skin as directed every 24 hours.  Dose:  1 Patch        CHANGE how you take these medications      Instructions   acyclovir 800 MG Tabs  What changed:  · when to take this  · reasons to take this  Commonly known as:  ZOVIRAX   Take 1 Tab by mouth 2 times a day.  Dose:  800 mg        CONTINUE taking these medications      Instructions   acetaminophen 500 MG Tabs  Commonly known as:  TYLENOL   Take 1,000 mg by mouth every 6 hours as needed for  Moderate Pain.  Dose:  1000 mg     amitriptyline 150 MG Tabs  Commonly known as:  ELAVIL   Take 150 mg by mouth every evening.  Dose:  150 mg     cyanocobalamin 500 MCG Tabs  Commonly known as:  VITAMIN B-12   Take 500 mcg by mouth every day.  Dose:  500 mcg     hydrOXYzine HCl 25 MG Tabs  Commonly known as:  ATARAX   Take 1 Tab by mouth every 24 hours as needed for Itching.  Dose:  25 mg     levothyroxine 125 MCG Tabs  Commonly known as:  SYNTHROID   Take 62.5 mcg by mouth Every morning on an empty stomach.  Dose:  62.5 mcg     MAGNESIUM PO   Take 1 Cap by mouth every day.  Dose:  1 Cap     PROBIOTIC DAILY PO   Take 2 Caps by mouth every evening.  Dose:  2 Cap     therapeutic multivitamin-minerals Tabs   Take 1 Tab by mouth every day.  Dose:  1 Tab     tramadol 50 MG Tabs  Commonly known as:  ULTRAM   Take 50 mg by mouth every 8 hours as needed for Moderate Pain.  Dose:  50 mg            Allergies  Allergies   Allergen Reactions   • Codeine Rash     Rash all over           DIET  regualr    ACTIVITY  As tolerated    CONSULTATIONS  none    PROCEDURES  none    LABORATORY  Lab Results   Component Value Date    SODIUM 136 06/19/2019    POTASSIUM 3.9 06/19/2019    CHLORIDE 104 06/19/2019    CO2 25 06/19/2019    GLUCOSE 117 (H) 06/19/2019    BUN 10 06/19/2019    CREATININE 0.70 06/19/2019        Lab Results   Component Value Date    WBC 5.8 06/19/2019    HEMOGLOBIN 12.7 06/19/2019    HEMATOCRIT 38.2 06/19/2019    PLATELETCT 188 06/19/2019

## 2019-07-18 ENCOUNTER — HOSPITAL ENCOUNTER (OUTPATIENT)
Dept: LAB | Facility: MEDICAL CENTER | Age: 74
End: 2019-07-18
Attending: INTERNAL MEDICINE
Payer: MEDICARE

## 2019-07-18 LAB
T4 FREE SERPL-MCNC: 0.8 NG/DL (ref 0.53–1.43)
TSH SERPL DL<=0.005 MIU/L-ACNC: 2.54 UIU/ML (ref 0.38–5.33)

## 2019-07-18 PROCEDURE — 36415 COLL VENOUS BLD VENIPUNCTURE: CPT

## 2019-07-18 PROCEDURE — 84443 ASSAY THYROID STIM HORMONE: CPT

## 2019-07-18 PROCEDURE — 84439 ASSAY OF FREE THYROXINE: CPT

## 2019-09-09 RX ORDER — AMITRIPTYLINE HYDROCHLORIDE 150 MG/1
TABLET ORAL
Qty: 90 TAB | Refills: 1 | Status: SHIPPED | OUTPATIENT
Start: 2019-09-09 | End: 2020-03-11

## 2019-09-09 NOTE — TELEPHONE ENCOUNTER
Was the patient seen in the last year in this department? Yes lov 6/24/19    Does patient have an active prescription for medications requested? No     Received Request Via: Pharmacy

## 2019-10-16 ENCOUNTER — HOSPITAL ENCOUNTER (OUTPATIENT)
Dept: LAB | Facility: MEDICAL CENTER | Age: 74
End: 2019-10-16
Attending: INTERNAL MEDICINE
Payer: MEDICARE

## 2019-10-16 LAB
T4 FREE SERPL-MCNC: 0.99 NG/DL (ref 0.53–1.43)
TSH SERPL DL<=0.005 MIU/L-ACNC: 1.97 UIU/ML (ref 0.38–5.33)

## 2019-10-16 PROCEDURE — 84443 ASSAY THYROID STIM HORMONE: CPT

## 2019-10-16 PROCEDURE — 36415 COLL VENOUS BLD VENIPUNCTURE: CPT

## 2019-10-16 PROCEDURE — 84439 ASSAY OF FREE THYROXINE: CPT

## 2019-10-31 ENCOUNTER — OFFICE VISIT (OUTPATIENT)
Dept: MEDICAL GROUP | Facility: LAB | Age: 74
End: 2019-10-31
Payer: MEDICARE

## 2019-10-31 VITALS
OXYGEN SATURATION: 99 % | SYSTOLIC BLOOD PRESSURE: 110 MMHG | TEMPERATURE: 98.2 F | HEART RATE: 82 BPM | DIASTOLIC BLOOD PRESSURE: 60 MMHG | BODY MASS INDEX: 24.74 KG/M2 | WEIGHT: 126 LBS | HEIGHT: 60 IN

## 2019-10-31 DIAGNOSIS — M54.2 PAIN OF CERVICAL SPINE: ICD-10-CM

## 2019-10-31 DIAGNOSIS — M43.10 SPONDYLOLISTHESIS, UNSPECIFIED SPINAL REGION: ICD-10-CM

## 2019-10-31 DIAGNOSIS — M54.6 THORACIC SPINE PAIN: ICD-10-CM

## 2019-10-31 DIAGNOSIS — G89.29 CHRONIC LEFT-SIDED LOW BACK PAIN WITH BILATERAL SCIATICA: ICD-10-CM

## 2019-10-31 DIAGNOSIS — M54.42 CHRONIC LEFT-SIDED LOW BACK PAIN WITH BILATERAL SCIATICA: ICD-10-CM

## 2019-10-31 DIAGNOSIS — M54.41 CHRONIC LEFT-SIDED LOW BACK PAIN WITH BILATERAL SCIATICA: ICD-10-CM

## 2019-10-31 PROCEDURE — 99214 OFFICE O/P EST MOD 30 MIN: CPT | Performed by: FAMILY MEDICINE

## 2019-10-31 RX ORDER — DIPHENOXYLATE HYDROCHLORIDE AND ATROPINE SULFATE 2.5; .025 MG/1; MG/1
1 TABLET ORAL 4 TIMES DAILY PRN
COMMUNITY
End: 2021-11-03

## 2019-10-31 RX ORDER — BENZONATATE 100 MG/1
100 CAPSULE ORAL 3 TIMES DAILY PRN
COMMUNITY
End: 2020-09-14

## 2019-10-31 NOTE — PATIENT INSTRUCTIONS
Nabumetone tablets  What is this medicine?  NABUMETONE (na BYOO me tone) is a non-steroidal anti-inflammatory drug (NSAID). It is used to reduce swelling and to treat pain. It is used for osteoarthritis or rheumatoid arthritis.  This medicine may be used for other purposes; ask your health care provider or pharmacist if you have questions.  COMMON BRAND NAME(S): Relafen  What should I tell my health care provider before I take this medicine?  They need to know if you have any of these conditions:  -asthma  -cigarette smoker  -drink more than 3 alcohol containing drinks a day  -heart disease or circulation problems such as heart failure or leg edema (fluid retention)  -hemophilia or bleeding problems  -high blood pressure  -kidney disease  -liver disease  -stomach bleeding or ulcers  -an unusual or allergic reaction to nabumetone, aspirin, other NSAIDs, other medicines, foods, dyes, or preservatives  -pregnant or trying to get pregnant  -breast-feeding  How should I use this medicine?  Take this medicine by mouth with a full glass of water. Follow the directions on the prescription label. You can take it with or without food. If it upsets your stomach, take it with food. Try to not lie down for at least 10 minutes after you take this medicine. Take your medicine at regular intervals. Do not take your medicine more often than directed. Long term, continuous use may increase the risk of heart attack or stroke.  A special MedGuide will be given to you by the pharmacist with each prescription and refill. Be sure to read this information carefully each time.  Talk to your pediatrician regarding the use of this medicine in children. Special care may be needed.  Overdosage: If you think you have taken too much of this medicine contact a poison control center or emergency room at once.  NOTE: This medicine is only for you. Do not share this medicine with others.  What if I miss a dose?  If you miss a dose, take it as soon as  you can. If it is almost time for your next dose, take only that dose. Do not take double or extra doses.  What may interact with this medicine?  -alcohol  -aspirin  -cidofovir  -diuretics  -lithium  -medicines for high blood pressure  -methotrexate  -other drugs for inflammation like ketorolac, ibuprofen, and prednisone  -pemetrexed  -warfarin  This list may not describe all possible interactions. Give your health care provider a list of all the medicines, herbs, non-prescription drugs, or dietary supplements you use. Also tell them if you smoke, drink alcohol, or use illegal drugs. Some items may interact with your medicine.  What should I watch for while using this medicine?  Tell your doctor or health care professional if your pain does not get better. Talk to your doctor before taking another medicine for pain. Do not treat yourself.  This medicine does not prevent heart attack or stroke. In fact, this medicine may increase the chance of a heart attack or stroke. The chance may increase with longer use of this medicine and in people who have heart disease. If you take aspirin to prevent heart attack or stroke, talk with your doctor or health care professional.  Do not take medicines such as ibuprofen and naproxen with this medicine. Side effects such as stomach upset, nausea, or ulcers may be more likely to occur. Many medicines available without a prescription should not be taken with this medicine.  This medicine can cause ulcers and bleeding in the stomach and intestines at any time during treatment. Do not smoke cigarettes or drink alcohol. These increase irritation to your stomach and can make it more susceptible to damage from this medicine. Ulcers and bleeding can happen without warning symptoms and can cause death.  You may get drowsy or dizzy. Do not drive, use machinery, or do anything that needs mental alertness until you know how this medicine affects you. Do not stand or sit up quickly, especially  if you are an older patient. This reduces the risk of dizzy or fainting spells.  This medicine can cause you to bleed more easily. Try to avoid damage to your teeth and gums when you brush or floss your teeth.  What side effects may I notice from receiving this medicine?  Side effects that you should report to your doctor or health care professional as soon as possible:  -black or bloody stools, blood in the urine or vomit  -blurred vision  -chest pain  -difficulty breathing or wheezing  -nausea or vomiting  -skin rash, skin redness, blistering or peeling skin, hives, or itching  -slurred speech or weakness on one side of the body  -severe stomach pain  -swelling of eyelids, throat, lips  -unexplained weight gain or swelling  -unusually weak or tired  -yellowing of eyes or skin  Side effects that usually do not require medical attention (report to your doctor or health care professional if they continue or are bothersome):  -constipation or diarrhea  -gas or heartburn  This list may not describe all possible side effects. Call your doctor for medical advice about side effects. You may report side effects to FDA at 7-948-FDA-0521.  Where should I keep my medicine?  Keep out of the reach of children.  Store at room temperature between 15 and 30 degrees C (59 and 86 degrees F). Keep container tightly closed. Throw away any unused medicine after the expiration date.  NOTE: This sheet is a summary. It may not cover all possible information. If you have questions about this medicine, talk to your doctor, pharmacist, or health care provider.  © 2018 [employer] Standard (2009-07-08 15:31:50)  Meloxicam tablets  What is this medicine?  MELOXICAM (bruce OX i cam) is a non-steroidal anti-inflammatory drug (NSAID). It is used to reduce swelling and to treat pain. It may be used for osteoarthritis, rheumatoid arthritis, or juvenile rheumatoid arthritis.  This medicine may be used for other purposes; ask your health care  provider or pharmacist if you have questions.  COMMON BRAND NAME(S): Louie  What should I tell my health care provider before I take this medicine?  They need to know if you have any of these conditions:  -bleeding disorders  -cigarette smoker  -coronary artery bypass graft (CABG) surgery within the past 2 weeks  -drink more than 3 alcohol-containing drinks per day  -heart disease  -high blood pressure  -history of stomach bleeding  -kidney disease  -liver disease  -lung or breathing disease, like asthma  -stomach or intestine problems  -an unusual or allergic reaction to meloxicam, aspirin, other NSAIDs, other medicines, foods, dyes, or preservatives  -pregnant or trying to get pregnant  -breast-feeding  How should I use this medicine?  Take this medicine by mouth with a full glass of water. Follow the directions on the prescription label. You can take it with or without food. If it upsets your stomach, take it with food. Take your medicine at regular intervals. Do not take it more often than directed. Do not stop taking except on your doctor's advice.  A special MedGuide will be given to you by the pharmacist with each prescription and refill. Be sure to read this information carefully each time.  Talk to your pediatrician regarding the use of this medicine in children. While this drug may be prescribed for selected conditions, precautions do apply.  Patients over 65 years old may have a stronger reaction and need a smaller dose.  Overdosage: If you think you have taken too much of this medicine contact a poison control center or emergency room at once.  NOTE: This medicine is only for you. Do not share this medicine with others.  What if I miss a dose?  If you miss a dose, take it as soon as you can. If it is almost time for your next dose, take only that dose. Do not take double or extra doses.  What may interact with this medicine?  Do not take this medicine with any of the following  medications:  -cidofovir  -ketorolac  This medicine may also interact with the following medications:  -aspirin and aspirin-like medicines  -certain medicines for blood pressure, heart disease, irregular heart beat  -certain medicines for depression, anxiety, or psychotic disturbances  -certain medicines that treat or prevent blood clots like warfarin, enoxaparin, dalteparin, apixaban, dabigatran, rivaroxaban  -cyclosporine  -digoxin  -diuretics  -methotrexate  -other NSAIDs, medicines for pain and inflammation, like ibuprofen and naproxen  -pemetrexed  This list may not describe all possible interactions. Give your health care provider a list of all the medicines, herbs, non-prescription drugs, or dietary supplements you use. Also tell them if you smoke, drink alcohol, or use illegal drugs. Some items may interact with your medicine.  What should I watch for while using this medicine?  Tell your doctor or healthcare professional if your symptoms do not start to get better or if they get worse.  Do not take other medicines that contain aspirin, ibuprofen, or naproxen with this medicine. Side effects such as stomach upset, nausea, or ulcers may be more likely to occur. Many medicines available without a prescription should not be taken with this medicine.  This medicine can cause ulcers and bleeding in the stomach and intestines at any time during treatment. This can happen with no warning and may cause death. There is increased risk with taking this medicine for a long time. Smoking, drinking alcohol, older age, and poor health can also increase risks. Call your doctor right away if you have stomach pain or blood in your vomit or stool.  This medicine does not prevent heart attack or stroke. In fact, this medicine may increase the chance of a heart attack or stroke. The chance may increase with longer use of this medicine and in people who have heart disease. If you take aspirin to prevent heart attack or stroke,  talk with your doctor or health care professional.  What side effects may I notice from receiving this medicine?  Side effects that you should report to your doctor or health care professional as soon as possible:  -allergic reactions like skin rash, itching or hives, swelling of the face, lips, or tongue  -nausea, vomiting  -signs and symptoms of a blood clot such as breathing problems; changes in vision; chest pain; severe, sudden headache; pain, swelling, warmth in the leg; trouble speaking; sudden numbness or weakness of the face, arm, or leg  -signs and symptoms of bleeding such as bloody or black, tarry stools; red or dark-brown urine; spitting up blood or brown material that looks like coffee grounds; red spots on the skin; unusual bruising or bleeding from the eye, gums, or nose  -signs and symptoms of liver injury like dark yellow or brown urine; general ill feeling or flu-like symptoms; light-colored stools; loss of appetite; nausea; right upper belly pain; unusually weak or tired; yellowing of the eyes or skin  -signs and symptoms of stroke like changes in vision; confusion; trouble speaking or understanding; severe headaches; sudden numbness or weakness of the face, arm, or leg; trouble walking; dizziness; loss of balance or coordination  Side effects that usually do not require medical attention (report to your doctor or health care professional if they continue or are bothersome):  -constipation  -diarrhea  -gas  This list may not describe all possible side effects. Call your doctor for medical advice about side effects. You may report side effects to FDA at 2-270-BWP-6745.  Where should I keep my medicine?  Keep out of the reach of children.  Store at room temperature between 15 and 30 degrees C (59 and 86 degrees F). Throw away any unused medicine after the expiration date.  NOTE: This sheet is a summary. It may not cover all possible information. If you have questions about this medicine, talk to  your doctor, pharmacist, or health care provider.  © 2018 [employer]/Gold Standard (2017-01-18 19:28:16)

## 2019-11-12 NOTE — ASSESSMENT & PLAN NOTE
Patient has history of thoracic spine pain.  She is wondering if she needs to see a pain management specialist.

## 2019-11-12 NOTE — PROGRESS NOTES
Subjective:     Chief Complaint   Patient presents with   • Referral Needed     pain management   • Back Pain       Joie Hart is a 74 y.o. female here today for evaluation and management of:    Chronic left-sided low back pain with sciatica  Patient continues to have low back pain.  She uses the tramadol occasionally.  She is interested in considering NSAID.  She has not had recent imaging.  She denies any numbness or tingling.  No loss of bowel or bladder function.    Pain of cervical spine  Patient continues to have pain in her neck area.  It does improve with physical therapy.  She never did the x-ray that was scheduled.    Thoracic spine pain  Patient has history of thoracic spine pain.  She is wondering if she needs to see a pain management specialist.       Allergies   Allergen Reactions   • Codeine Rash     Rash all over           Current medicines (including changes today)  Current Outpatient Medications   Medication Sig Dispense Refill   • diphenoxylate-atropine (LOMOTIL) 2.5-0.025 MG Tab Take 1 Tab by mouth 4 times a day as needed for Diarrhea.     • tobramycin-dexamethasone (TOBRADEX) 0.3-0.1 % Ointment APPLY TO EYE LID MARGINS D UTD     • benzonatate (TESSALON) 100 MG Cap Take 100 mg by mouth 3 times a day as needed for Cough.     • amitriptyline (ELAVIL) 150 MG Tab TAKE 1 TABLET BY MOUTH AT BEDTIME AS NEEDED 90 Tab 1   • acetaminophen (TYLENOL) 500 MG Tab Take 1,000 mg by mouth every 6 hours as needed for Moderate Pain.     • levothyroxine (SYNTHROID) 125 MCG Tab Take 62.5 mcg by mouth Every morning on an empty stomach.     • hydrOXYzine HCl (ATARAX) 25 MG Tab Take 1 Tab by mouth every 24 hours as needed for Itching. 30 Tab 1   • therapeutic multivitamin-minerals (THERAGRAN-M) Tab Take 1 Tab by mouth every day.     • Probiotic Product (PROBIOTIC DAILY PO) Take 2 Caps by mouth every evening.     • tramadol (ULTRAM) 50 MG Tab Take 50 mg by mouth every 8 hours as needed for Moderate Pain.     •  cyanocobalamin (VITAMIN B-12) 500 MCG Tab Take 500 mcg by mouth every day.       No current facility-administered medications for this visit.        She  has a past medical history of Allergy, Anemia, Anxiety, Cataract, Chronic back pain, Depression (2009), Diverticulitis, Glaucoma, Head injury, closed (3/6/16), HSV infection, IBD (inflammatory bowel disease), Migraine, Pelvic fracture (HCC) (1976), Spondylisthesis, Thyroid disease, and Vaginal vault prolapse (2006).    Patient Active Problem List    Diagnosis Date Noted   • Thoracic spine pain 10/31/2019   • Anxiety 06/24/2019   • Hyponatremia 06/18/2019   • Pain in the chest 06/18/2019   • Plantar wart of right foot 04/17/2019   • Excessive ear wax, left 04/17/2019   • Dyslipidemia 04/17/2019   • Breast pain, left 03/12/2019   • Chronic bilateral thoracic back pain 03/12/2019   • Vitamin D deficiency 03/12/2019   • Right flank pain 01/16/2019   • Acute midline low back pain without sciatica 01/11/2019   • Balance problem 01/11/2019   • Chest trauma 01/11/2019   • Rib pain on left side 12/12/2018   • Acute buttock pain 07/26/2018   • Pain of cervical spine 06/26/2018   • Acquired hypothyroidism 06/26/2018   • Chronic diarrhea of unknown origin 06/07/2018   • Hematuria 04/11/2018   • Pain of right upper extremity 03/27/2018   • Chronic pain syndrome 03/27/2018   • Abnormal CT of the abdomen 03/27/2018   • White matter abnormality on MRI of brain 01/09/2018   • Chronic headaches 10/05/2017   • Chronic non-specific white matter lesions on MRI 10/05/2017   • Chronic left-sided low back pain with sciatica 07/25/2017   • Seasonal allergic rhinitis due to pollen 05/26/2017   • B12 deficiency 12/20/2016   • Nonintractable headache 06/17/2016   • Spondylisthesis    • Acquired cyst of kidney 03/23/2016   • Lymphocytic thyroiditis 03/23/2016   • Herpes simplex virus (HSV) infection 11/07/2012   • Other hyperlipidemia 11/07/2012   • Chronic urticaria 11/07/2012   • Osteopenia  11/06/2012       ROS   No fever or chills.  No nausea or vomiting.  No chest pain or palpitations.  No cough or SOB.  No pain with urination or hematuria.  No black or bloody stools.       Objective:     /60 (BP Location: Right arm, Patient Position: Sitting, BP Cuff Size: Adult)   Pulse 82   Temp 36.8 °C (98.2 °F) (Temporal)   Ht 1.524 m (5')   Wt 57.2 kg (126 lb)   SpO2 99%  Body mass index is 24.61 kg/m².   Physical Exam:  Well developed, well nourished.  Alert, oriented in no acute distress.  Eye contact is good, speech goal directed, affect calm  Eyes: conjunctiva non-injected, sclera non-icteric.  Neck Supple.  No adenopathy or masses in the neck or supraclavicular regions. No thyromegaly.  No spinous process tenderness.  Tenderness to palpation of the paraspinal muscles  Lungs: clear to auscultation bilaterally with good excursion. No wheezes or rhonchi  CV: regular rate and rhythm. No murmur  SPINE: No significant spinal curvature on forward bend. Mild tenderness in paraspinous muscles of the thoracic and lumbar spine without current spasm. SLT negative. DTR 2+ patella, 1+ achilles bilaterally. Strength 5/5 proximal and distal LE.  No pain with stress of SI. Full hip ROM. Poor hamstring flexibility. No symptoms with axial loading.             Assessment and Plan:   The following treatment plan was discussed    1. Chronic left-sided low back pain with bilateral sciatica  X-ray to assess.  Consider nabumetone or meloxicam.  I have given patient information to read about both of these.  Await results.  Consider referral to pain management or PMR pending results  - DX-LUMBAR SPINE-2 OR 3 VIEWS; Future    2. Thoracic spine pain  X-ray to assess.  Consider nabumetone or meloxicam.  I have given patient information to read about both of these.  Await results.  Consider referral to pain management or PMR pending results  - DX-THORACIC SPINE-WITH SWIMMERS VIEW; Future    3. Pain of cervical spine  X-ray to  assess.  Consider nabumetone or meloxicam.  I have given patient information to read about both of these.  Await results.  Consider referral to pain management or PMR pending results  - DX-CERVICAL SPINE-2 OR 3 VIEWS; Future    4. Spondylolisthesis, unspecified spinal region  X-ray to assess.  Consider nabumetone or meloxicam.  I have given patient information to read about both of these.  Await results.  Consider referral to pain management or PMR pending results  - DX-LUMBAR SPINE-2 OR 3 VIEWS; Future    Any change or worsening of signs or symptoms, patient encouraged to follow-up or report to the emergency room for further evaluation. Patient understands and agrees.    Followup: Return if symptoms worsen or fail to improve.

## 2019-11-12 NOTE — ASSESSMENT & PLAN NOTE
Patient continues to have pain in her neck area.  It does improve with physical therapy.  She never did the x-ray that was scheduled.

## 2019-11-12 NOTE — ASSESSMENT & PLAN NOTE
Patient continues to have low back pain.  She uses the tramadol occasionally.  She is interested in considering NSAID.  She has not had recent imaging.  She denies any numbness or tingling.  No loss of bowel or bladder function.

## 2019-12-16 ENCOUNTER — HOSPITAL ENCOUNTER (OUTPATIENT)
Dept: RADIOLOGY | Facility: MEDICAL CENTER | Age: 74
End: 2019-12-16
Attending: FAMILY MEDICINE
Payer: MEDICARE

## 2019-12-16 DIAGNOSIS — M54.41 CHRONIC LEFT-SIDED LOW BACK PAIN WITH BILATERAL SCIATICA: ICD-10-CM

## 2019-12-16 DIAGNOSIS — M54.2 PAIN OF CERVICAL SPINE: ICD-10-CM

## 2019-12-16 DIAGNOSIS — G89.29 CHRONIC LEFT-SIDED LOW BACK PAIN WITH BILATERAL SCIATICA: ICD-10-CM

## 2019-12-16 DIAGNOSIS — M54.42 CHRONIC LEFT-SIDED LOW BACK PAIN WITH BILATERAL SCIATICA: ICD-10-CM

## 2019-12-16 DIAGNOSIS — M54.6 THORACIC SPINE PAIN: ICD-10-CM

## 2019-12-16 DIAGNOSIS — M43.10 SPONDYLOLISTHESIS, UNSPECIFIED SPINAL REGION: ICD-10-CM

## 2019-12-16 PROCEDURE — 72040 X-RAY EXAM NECK SPINE 2-3 VW: CPT

## 2019-12-16 PROCEDURE — 72100 X-RAY EXAM L-S SPINE 2/3 VWS: CPT

## 2019-12-16 PROCEDURE — 72072 X-RAY EXAM THORAC SPINE 3VWS: CPT

## 2019-12-31 ENCOUNTER — HOSPITAL ENCOUNTER (OUTPATIENT)
Dept: LAB | Facility: MEDICAL CENTER | Age: 74
End: 2019-12-31
Attending: PHYSICIAN ASSISTANT
Payer: MEDICARE

## 2019-12-31 LAB
T4 FREE SERPL-MCNC: 0.88 NG/DL (ref 0.53–1.43)
TSH SERPL DL<=0.005 MIU/L-ACNC: 1.67 UIU/ML (ref 0.38–5.33)

## 2019-12-31 PROCEDURE — 84439 ASSAY OF FREE THYROXINE: CPT

## 2019-12-31 PROCEDURE — 36415 COLL VENOUS BLD VENIPUNCTURE: CPT

## 2019-12-31 PROCEDURE — 84443 ASSAY THYROID STIM HORMONE: CPT

## 2020-03-10 ENCOUNTER — TELEPHONE (OUTPATIENT)
Dept: HEALTH INFORMATION MANAGEMENT | Facility: OTHER | Age: 75
End: 2020-03-10

## 2020-03-10 ENCOUNTER — TELEPHONE (OUTPATIENT)
Dept: MEDICAL GROUP | Facility: LAB | Age: 75
End: 2020-03-10

## 2020-03-10 NOTE — TELEPHONE ENCOUNTER
1. Caller Name: Joie Hart       Call Back Number: [phone]       How would the patient prefer to be contacted with a response: Phone call OK to leave a detailed message    PT had a cough/ cold on FEB 29 it was gone by the Thursday March 5 th. No fluids coming from her nose. She is alarmed of a discharge that came only once when she wiped herself. If was creamy with lite specs of blood.    please advise

## 2020-03-10 NOTE — TELEPHONE ENCOUNTER
1. Caller Name: Joie                       Call Back Number: [phone]  [facility] PCP or Specialty Provider: Yes Monae Beck        2.  Does patient have any active symptoms of respiratory illness (fever OR cough OR shortness of breath)? Yes, the patient reports the following respiratory symptoms: cough.    3.  In the last 30 days, has the patient traveled outside of the country OR in a high risk area within the [place] OR have any known contact with someone who has?  No.    4.  Does patient have any comoribidities? None     5. Disposition:  Advised to perform self care, monitor for worsening symptoms and to call back in 3 days if no improvement.    Note routed to PCP: DEBBIE only.     Patient is calling to schedule an appointment for bloody vaginal discharge that has been occurring once per month since January that she is concerned about. Cleared to make a PCP appointment for further follow up.

## 2020-03-11 RX ORDER — AMITRIPTYLINE HYDROCHLORIDE 150 MG/1
TABLET ORAL
Qty: 90 TAB | Refills: 1 | Status: SHIPPED | OUTPATIENT
Start: 2020-03-11 | End: 2020-09-09 | Stop reason: SDUPTHER

## 2020-03-27 ENCOUNTER — HOSPITAL ENCOUNTER (OUTPATIENT)
Dept: LAB | Facility: MEDICAL CENTER | Age: 75
End: 2020-03-27
Attending: INTERNAL MEDICINE
Payer: MEDICARE

## 2020-03-27 LAB
T4 FREE SERPL-MCNC: 1.4 NG/DL (ref 0.58–1.64)
TSH SERPL DL<=0.005 MIU/L-ACNC: 1.82 UIU/ML (ref 0.38–5.33)

## 2020-03-27 PROCEDURE — 84439 ASSAY OF FREE THYROXINE: CPT

## 2020-03-27 PROCEDURE — 84443 ASSAY THYROID STIM HORMONE: CPT

## 2020-03-27 PROCEDURE — 36415 COLL VENOUS BLD VENIPUNCTURE: CPT

## 2020-07-23 ENCOUNTER — HOSPITAL ENCOUNTER (OUTPATIENT)
Dept: LAB | Facility: MEDICAL CENTER | Age: 75
End: 2020-07-23
Attending: INTERNAL MEDICINE
Payer: MEDICARE

## 2020-07-23 LAB
T4 FREE SERPL-MCNC: 1.22 NG/DL (ref 0.93–1.7)
TSH SERPL DL<=0.005 MIU/L-ACNC: 1.09 UIU/ML (ref 0.38–5.33)

## 2020-07-23 PROCEDURE — 84443 ASSAY THYROID STIM HORMONE: CPT

## 2020-07-23 PROCEDURE — 36415 COLL VENOUS BLD VENIPUNCTURE: CPT

## 2020-07-23 PROCEDURE — 84439 ASSAY OF FREE THYROXINE: CPT

## 2020-09-10 RX ORDER — AMITRIPTYLINE HYDROCHLORIDE 150 MG/1
TABLET ORAL
Qty: 90 TAB | Refills: 0 | Status: SHIPPED | OUTPATIENT
Start: 2020-09-10 | End: 2020-09-14 | Stop reason: SDUPTHER

## 2020-09-10 NOTE — TELEPHONE ENCOUNTER
Received request via: Patient    Was the patient seen in the last year in this department? No   10/31/2019  Does the patient have an active prescription (recently filled or refills available) for medication(s) requested? No

## 2020-09-14 ENCOUNTER — OFFICE VISIT (OUTPATIENT)
Dept: MEDICAL GROUP | Facility: LAB | Age: 75
End: 2020-09-14
Payer: MEDICARE

## 2020-09-14 VITALS
WEIGHT: 129 LBS | BODY MASS INDEX: 23.74 KG/M2 | TEMPERATURE: 99.5 F | DIASTOLIC BLOOD PRESSURE: 64 MMHG | RESPIRATION RATE: 16 BRPM | HEIGHT: 62 IN | OXYGEN SATURATION: 94 % | HEART RATE: 91 BPM | SYSTOLIC BLOOD PRESSURE: 118 MMHG

## 2020-09-14 DIAGNOSIS — E03.9 ACQUIRED HYPOTHYROIDISM: ICD-10-CM

## 2020-09-14 DIAGNOSIS — Z11.59 NEED FOR HEPATITIS C SCREENING TEST: ICD-10-CM

## 2020-09-14 DIAGNOSIS — Z00.00 MEDICARE ANNUAL WELLNESS VISIT, SUBSEQUENT: ICD-10-CM

## 2020-09-14 DIAGNOSIS — G89.29 CHRONIC LEFT-SIDED LOW BACK PAIN WITH BILATERAL SCIATICA: ICD-10-CM

## 2020-09-14 DIAGNOSIS — Z12.31 ENCOUNTER FOR SCREENING MAMMOGRAM FOR BREAST CANCER: ICD-10-CM

## 2020-09-14 DIAGNOSIS — M54.41 CHRONIC LEFT-SIDED LOW BACK PAIN WITH BILATERAL SCIATICA: ICD-10-CM

## 2020-09-14 DIAGNOSIS — Z13.1 SCREENING FOR DIABETES MELLITUS: ICD-10-CM

## 2020-09-14 DIAGNOSIS — E55.9 VITAMIN D DEFICIENCY: ICD-10-CM

## 2020-09-14 DIAGNOSIS — E53.8 B12 DEFICIENCY: ICD-10-CM

## 2020-09-14 DIAGNOSIS — Z13.0 SCREENING FOR DEFICIENCY ANEMIA: ICD-10-CM

## 2020-09-14 DIAGNOSIS — E78.49 OTHER HYPERLIPIDEMIA: ICD-10-CM

## 2020-09-14 DIAGNOSIS — M54.42 CHRONIC LEFT-SIDED LOW BACK PAIN WITH BILATERAL SCIATICA: ICD-10-CM

## 2020-09-14 PROBLEM — R93.5 ABNORMAL CT OF THE ABDOMEN: Status: RESOLVED | Noted: 2018-03-27 | Resolved: 2020-09-14

## 2020-09-14 PROBLEM — B07.0 PLANTAR WART OF RIGHT FOOT: Status: RESOLVED | Noted: 2019-04-17 | Resolved: 2020-09-14

## 2020-09-14 PROBLEM — R07.9 PAIN IN THE CHEST: Status: RESOLVED | Noted: 2019-06-18 | Resolved: 2020-09-14

## 2020-09-14 PROBLEM — R26.89 BALANCE PROBLEM: Status: RESOLVED | Noted: 2019-01-11 | Resolved: 2020-09-14

## 2020-09-14 PROBLEM — R31.9 HEMATURIA: Status: RESOLVED | Noted: 2018-04-11 | Resolved: 2020-09-14

## 2020-09-14 PROBLEM — E78.5 DYSLIPIDEMIA: Status: RESOLVED | Noted: 2019-04-17 | Resolved: 2020-09-14

## 2020-09-14 PROBLEM — H61.22 EXCESSIVE EAR WAX, LEFT: Status: RESOLVED | Noted: 2019-04-17 | Resolved: 2020-09-14

## 2020-09-14 PROBLEM — M79.601 PAIN OF RIGHT UPPER EXTREMITY: Status: RESOLVED | Noted: 2018-03-27 | Resolved: 2020-09-14

## 2020-09-14 PROBLEM — E04.1 NONTOXIC SINGLE THYROID NODULE: Status: ACTIVE | Noted: 2020-09-14

## 2020-09-14 PROBLEM — M54.6 PAIN IN THORACIC SPINE: Status: RESOLVED | Noted: 2019-10-31 | Resolved: 2020-09-14

## 2020-09-14 PROBLEM — S29.9XXA CHEST TRAUMA: Status: RESOLVED | Noted: 2019-01-11 | Resolved: 2020-09-14

## 2020-09-14 PROBLEM — R07.81 RIB PAIN ON LEFT SIDE: Status: RESOLVED | Noted: 2018-12-12 | Resolved: 2020-09-14

## 2020-09-14 PROBLEM — M79.18 ACUTE BUTTOCK PAIN: Status: RESOLVED | Noted: 2018-07-26 | Resolved: 2020-09-14

## 2020-09-14 PROBLEM — R10.9 RIGHT FLANK PAIN: Status: RESOLVED | Noted: 2019-01-16 | Resolved: 2020-09-14

## 2020-09-14 PROCEDURE — G0439 PPPS, SUBSEQ VISIT: HCPCS | Performed by: FAMILY MEDICINE

## 2020-09-14 RX ORDER — AMITRIPTYLINE HYDROCHLORIDE 150 MG/1
TABLET ORAL
Qty: 90 TAB | Refills: 0 | Status: SHIPPED | OUTPATIENT
Start: 2020-09-14 | End: 2020-12-16

## 2020-09-14 RX ORDER — ACETAMINOPHEN/DIPHENHYDRAMINE 500MG-25MG
TABLET ORAL
COMMUNITY

## 2020-09-14 ASSESSMENT — ACTIVITIES OF DAILY LIVING (ADL): BATHING_REQUIRES_ASSISTANCE: 0

## 2020-09-14 ASSESSMENT — PATIENT HEALTH QUESTIONNAIRE - PHQ9: CLINICAL INTERPRETATION OF PHQ2 SCORE: 0

## 2020-09-14 ASSESSMENT — ENCOUNTER SYMPTOMS: GENERAL WELL-BEING: GOOD

## 2020-09-14 ASSESSMENT — FIBROSIS 4 INDEX: FIB4 SCORE: 1.76

## 2020-09-14 NOTE — PATIENT INSTRUCTIONS
Low Back Strain Rehab  Ask your health care provider which exercises are safe for you. Do exercises exactly as told by your health care provider and adjust them as directed. It is normal to feel mild stretching, pulling, tightness, or discomfort as you do these exercises, but you should stop right away if you feel sudden pain or your pain gets worse. Do not begin these exercises until told by your health care provider.  Stretching and range of motion exercises  These exercises warm up your muscles and joints and improve the movement and flexibility of your back. These exercises also help to relieve pain, numbness, and tingling.  Exercise A: Single knee to chest  1. Lie on your back on a firm surface with both legs straight.  2. Bend one of your knees. Use your hands to move your knee up toward your chest until you feel a gentle stretch in your lower back and buttock.  ¨ Hold your leg in this position by holding onto the front of your knee.  ¨ Keep your other leg as straight as possible.  3. Hold for __________ seconds.  4. Slowly return to the starting position.  5. Repeat with your other leg.  Repeat __________ times. Complete this exercise __________ times a day.  Exercise B: Prone extension on elbows  1. Lie on your abdomen on a firm surface.  2. Prop yourself up on your elbows.  3. Use your arms to help lift your chest up until you feel a gentle stretch in your abdomen and your lower back.  ¨ This will place some of your body weight on your elbows. If this is uncomfortable, try stacking pillows under your chest.  ¨ Your hips should stay down, against the surface that you are lying on. Keep your hip and back muscles relaxed.  4. Hold for __________ seconds.  5. Slowly relax your upper body and return to the starting position.  Repeat __________ times. Complete this exercise __________ times a day.  Strengthening exercises  These exercises build strength and endurance in your back. Endurance is the ability to use  "your muscles for a long time, even after they get tired.  Exercise C: Pelvic tilt  1. Lie on your back on a firm surface. Bend your knees and keep your feet flat.  2. Tense your abdominal muscles. Tip your pelvis up toward the ceiling and flatten your lower back into the floor.  ¨ To help with this exercise, you may place a small towel under your lower back and try to push your back into the towel.  3. Hold for __________ seconds.  4. Let your muscles relax completely before you repeat this exercise.  Repeat __________ times. Complete this exercise __________ times a day.  Exercise D: Alternating arm and leg raises  1. Get on your hands and knees on a firm surface. If you are on a hard floor, you may want to use padding to cushion your knees, such as an exercise mat.  2. Line up your arms and legs. Your hands should be below your shoulders, and your knees should be below your hips.  3. Lift your left leg behind you. At the same time, raise your right arm and straighten it in front of you.  ¨ Do not lift your leg higher than your hip.  ¨ Do not lift your arm higher than your shoulder.  ¨ Keep your abdominal and back muscles tight.  ¨ Keep your hips facing the ground.  ¨ Do not arch your back.  ¨ Keep your balance carefully, and do not hold your breath.  4. Hold for __________ seconds.  5. Slowly return to the starting position and repeat with your right leg and your left arm.  Repeat __________ times. Complete this exercise __________times a day.  Exercise J: Single leg lower with bent knees  1. Lie on your back on a firm surface.  2. Tense your abdominal muscles and lift your feet off the floor, one foot at a time, so your knees and hips are bent in an \"L\" shape (at about 90 degrees).  ¨ Your knees should be over your hips and your lower legs should be parallel to the floor.  3. Keeping your abdominal muscles tense and your knee bent, slowly lower one of your legs so your toe touches the ground.  4. Lift your leg " back up to return to the starting position.  ¨ Do not hold your breath.  ¨ Do not let your back arch. Keep your back flat against the ground.  5. Repeat with your other leg.  Repeat __________ times. Complete this exercise __________ times a day.  Posture and body mechanics     Body mechanics refers to the movements and positions of your body while you do your daily activities. Posture is part of body mechanics. Good posture and healthy body mechanics can help to relieve stress in your body's tissues and joints. Good posture means that your spine is in its natural S-curve position (your spine is neutral), your shoulders are pulled back slightly, and your head is not tipped forward. The following are general guidelines for applying improved posture and body mechanics to your everyday activities.  Standing    · When standing, keep your spine neutral and your feet about hip-width apart. Keep a slight bend in your knees. Your ears, shoulders, and hips should line up.  · When you do a task in which you  one place for a long time, place one foot up on a stable object that is 2-4 inches (5-10 cm) high, such as a footstool. This helps keep your spine neutral.  Sitting  · When sitting, keep your spine neutral and keep your feet flat on the floor. Use a footrest, if necessary, and keep your thighs parallel to the floor. Avoid rounding your shoulders, and avoid tilting your head forward.  · When working at a desk or a computer, keep your desk at a height where your hands are slightly lower than your elbows. Slide your chair under your desk so you are close enough to maintain good posture.  · When working at a computer, place your monitor at a height where you are looking straight ahead and you do not have to tilt your head forward or downward to look at the screen.  Resting    · When lying down and resting, avoid positions that are most painful for you.  · If you have pain with activities such as sitting, bending,  stooping, or squatting (flexion-based activities), lie in a position in which your body does not bend very much. For example, avoid curling up on your side with your arms and knees near your chest (fetal position).  · If you have pain with activities such as standing for a long time or reaching with your arms (extension-based activities), lie with your spine in a neutral position and bend your knees slightly. Try the following positions:  ¨ Lying on your side with a pillow between your knees.  ¨ Lying on your back with a pillow under your knees.  Lifting    · When lifting objects, keep your feet at least shoulder-width apart and tighten your abdominal muscles.  · Bend your knees and hips and keep your spine neutral. It is important to lift using the strength of your legs, not your back. Do not lock your knees straight out.  · Always ask for help to lift heavy or awkward objects.  This information is not intended to replace advice given to you by your health care provider. Make sure you discuss any questions you have with your health care provider.  Document Released: 12/18/2006 Document Revised: 08/24/2017 Document Reviewed: 09/28/2016  [employer] Patient Education © 2017 [employer]
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
## 2020-09-21 ENCOUNTER — HOSPITAL ENCOUNTER (OUTPATIENT)
Dept: LAB | Facility: MEDICAL CENTER | Age: 75
End: 2020-09-21
Attending: FAMILY MEDICINE
Payer: MEDICARE

## 2020-09-21 DIAGNOSIS — E55.9 VITAMIN D DEFICIENCY: ICD-10-CM

## 2020-09-21 DIAGNOSIS — E53.8 B12 DEFICIENCY: ICD-10-CM

## 2020-09-21 DIAGNOSIS — E03.9 ACQUIRED HYPOTHYROIDISM: ICD-10-CM

## 2020-09-21 DIAGNOSIS — Z13.1 SCREENING FOR DIABETES MELLITUS: ICD-10-CM

## 2020-09-21 DIAGNOSIS — E78.49 OTHER HYPERLIPIDEMIA: ICD-10-CM

## 2020-09-21 DIAGNOSIS — Z11.59 NEED FOR HEPATITIS C SCREENING TEST: ICD-10-CM

## 2020-09-21 DIAGNOSIS — Z13.0 SCREENING FOR DEFICIENCY ANEMIA: ICD-10-CM

## 2020-09-21 LAB
25(OH)D3 SERPL-MCNC: 36 NG/ML (ref 30–100)
ALBUMIN SERPL BCP-MCNC: 4.1 G/DL (ref 3.2–4.9)
ALBUMIN/GLOB SERPL: 1.5 G/DL
ALP SERPL-CCNC: 98 U/L (ref 30–99)
ALT SERPL-CCNC: 42 U/L (ref 2–50)
ANION GAP SERPL CALC-SCNC: 7 MMOL/L (ref 7–16)
AST SERPL-CCNC: 29 U/L (ref 12–45)
BASOPHILS # BLD AUTO: 0.5 % (ref 0–1.8)
BASOPHILS # BLD: 0.03 K/UL (ref 0–0.12)
BILIRUB SERPL-MCNC: 0.4 MG/DL (ref 0.1–1.5)
BUN SERPL-MCNC: 14 MG/DL (ref 8–22)
CALCIUM SERPL-MCNC: 9.3 MG/DL (ref 8.4–10.2)
CHLORIDE SERPL-SCNC: 103 MMOL/L (ref 96–112)
CHOLEST SERPL-MCNC: 215 MG/DL (ref 100–199)
CO2 SERPL-SCNC: 28 MMOL/L (ref 20–33)
CREAT SERPL-MCNC: 0.63 MG/DL (ref 0.5–1.4)
EOSINOPHIL # BLD AUTO: 0 K/UL (ref 0–0.51)
EOSINOPHIL NFR BLD: 0 % (ref 0–6.9)
ERYTHROCYTE [DISTWIDTH] IN BLOOD BY AUTOMATED COUNT: 42.5 FL (ref 35.9–50)
FASTING STATUS PATIENT QL REPORTED: NORMAL
GLOBULIN SER CALC-MCNC: 2.7 G/DL (ref 1.9–3.5)
GLUCOSE SERPL-MCNC: 101 MG/DL (ref 65–99)
HCT VFR BLD AUTO: 39 % (ref 37–47)
HCV AB SER QL: NORMAL
HDLC SERPL-MCNC: 82 MG/DL
HGB BLD-MCNC: 13.1 G/DL (ref 12–16)
IMM GRANULOCYTES # BLD AUTO: 0 K/UL (ref 0–0.11)
IMM GRANULOCYTES NFR BLD AUTO: 0 % (ref 0–0.9)
LDLC SERPL CALC-MCNC: 120 MG/DL
LYMPHOCYTES # BLD AUTO: 2.22 K/UL (ref 1–4.8)
LYMPHOCYTES NFR BLD: 40.7 % (ref 22–41)
MCH RBC QN AUTO: 32.8 PG (ref 27–33)
MCHC RBC AUTO-ENTMCNC: 33.6 G/DL (ref 33.6–35)
MCV RBC AUTO: 97.7 FL (ref 81.4–97.8)
MONOCYTES # BLD AUTO: 0.46 K/UL (ref 0–0.85)
MONOCYTES NFR BLD AUTO: 8.4 % (ref 0–13.4)
NEUTROPHILS # BLD AUTO: 2.75 K/UL (ref 2–7.15)
NEUTROPHILS NFR BLD: 50.4 % (ref 44–72)
NRBC # BLD AUTO: 0 K/UL
NRBC BLD-RTO: 0 /100 WBC
PLATELET # BLD AUTO: 204 K/UL (ref 164–446)
PMV BLD AUTO: 9.3 FL (ref 9–12.9)
POTASSIUM SERPL-SCNC: 4.9 MMOL/L (ref 3.6–5.5)
PROT SERPL-MCNC: 6.8 G/DL (ref 6–8.2)
RBC # BLD AUTO: 3.99 M/UL (ref 4.2–5.4)
SODIUM SERPL-SCNC: 138 MMOL/L (ref 135–145)
TRIGL SERPL-MCNC: 65 MG/DL (ref 0–149)
VIT B12 SERPL-MCNC: 917 PG/ML (ref 211–911)
WBC # BLD AUTO: 5.5 K/UL (ref 4.8–10.8)

## 2020-09-21 PROCEDURE — 36415 COLL VENOUS BLD VENIPUNCTURE: CPT

## 2020-09-21 PROCEDURE — 80053 COMPREHEN METABOLIC PANEL: CPT

## 2020-09-21 PROCEDURE — G0472 HEP C SCREEN HIGH RISK/OTHER: HCPCS

## 2020-09-21 PROCEDURE — 80061 LIPID PANEL: CPT

## 2020-09-21 PROCEDURE — 82607 VITAMIN B-12: CPT

## 2020-09-21 PROCEDURE — 82306 VITAMIN D 25 HYDROXY: CPT

## 2020-09-21 PROCEDURE — 85025 COMPLETE CBC W/AUTO DIFF WBC: CPT

## 2020-09-29 ENCOUNTER — NURSE TRIAGE (OUTPATIENT)
Dept: HEALTH INFORMATION MANAGEMENT | Facility: OTHER | Age: 75
End: 2020-09-29

## 2020-09-29 NOTE — TELEPHONE ENCOUNTER
"    Reason for Disposition  • Severe headache    Additional Information  • Negative: ACUTE NEUROLOGIC SYMPTOM and symptom present now  • Negative: Knocked out (unconscious) > 1 minute  • Negative: Seizure (convulsion) occurred (Exception: prior history of seizures and now alert and without Acute Neurologic Symptoms)  • Negative: Neck pain after dangerous injury (e.g., MVA, diving, trampoline, contact sports, fall > 10 feet or 3 meters) (Exception: neck pain began > 1 hour after injury)  • Negative: Major bleeding (actively dripping or spurting) that can't be stopped  • Negative: Penetrating head injury (e.g., knife, gunshot wound, metal object)  • Negative: Sounds like a life-threatening emergency to the triager  • Negative: Recently examined and diagnosed with a concussion by a healthcare provider and has questions about concussion symptoms  • Negative: Can't remember what happened (amnesia)  • Negative: Vomiting once or more  • Negative: Watery or blood-tinged fluid dripping from the nose or ears    Answer Assessment - Initial Assessment Questions  1. MECHANISM: \"How did the injury happen?\" For falls, ask: \"What height did you fall from?\" and \"What surface did you fall against?\"       fall  2. ONSET: \"When did the injury happen?\" (Minutes or hours ago)       2 days ago  3. NEUROLOGIC SYMPTOMS: \"Was there any loss of consciousness?\" \"Are there any other neurological symptoms?\"       Brief unconscious  4. MENTAL STATUS: \"Does the person know who he is, who you are, and where he is?\"       yes  5. LOCATION: \"What part of the head was hit?\"       Back of head, tender, no bleed  6. SCALP APPEARANCE: \"What does the scalp look like? Is it bleeding now?\" If so, ask: \"Is it difficult to stop?\"       unknown  7. SIZE: For cuts, bruises, or swelling, ask: \"How large is it?\" (e.g., inches or centimeters)       nothing  8. PAIN: \"Is there any pain?\" If so, ask: \"How bad is it?\"  (e.g., Scale 1-10; or mild, moderate, severe)    " "  8  9. TETANUS: For any breaks in the skin, ask: \"When was the last tetanus booster?\"      n/a  10. OTHER SYMPTOMS: \"Do you have any other symptoms?\" (e.g., neck pain, vomiting)        no  11. PREGNANCY: \"Is there any chance you are pregnant?\" \"When was your last menstrual period?\"        no    Protocols used: HEAD INJURY-A-OH      "

## 2020-09-30 ENCOUNTER — NURSE TRIAGE (OUTPATIENT)
Dept: HEALTH INFORMATION MANAGEMENT | Facility: OTHER | Age: 75
End: 2020-09-30

## 2020-09-30 NOTE — TELEPHONE ENCOUNTER
Reason for Disposition  • Patient wants to be seen    Additional Information  • Negative: ACUTE NEUROLOGIC SYMPTOM and symptom present now  • Negative: Knocked out (unconscious) > 1 minute  • Negative: Seizure (convulsion) occurred (Exception: prior history of seizures and now alert and without Acute Neurologic Symptoms)  • Negative: Neck pain after dangerous injury (e.g., MVA, diving, trampoline, contact sports, fall > 10 feet or 3 meters) (Exception: neck pain began > 1 hour after injury)  • Negative: Major bleeding (actively dripping or spurting) that can't be stopped  • Negative: Penetrating head injury (e.g., knife, gunshot wound, metal object)  • Negative: Sounds like a life-threatening emergency to the triager  • Negative: Recently examined and diagnosed with a concussion by a healthcare provider and has questions about concussion symptoms  • Negative: Can't remember what happened (amnesia)  • Negative: Vomiting once or more  • Negative: Watery or blood-tinged fluid dripping from the nose or ears  • Negative: ACUTE NEUROLOGIC SYMPTOM and now fine  • Negative: Knocked out (unconscious) < 1 minute and now fine  • Negative: Severe headache  • Negative: Dangerous injury (e.g., MVA, diving, trampoline, contact sports, fall > 10 feet or 3 meters) or severe blow from hard object (e.g., golf club or baseball bat)  • Negative: Large swelling or bruise > 2 inches (5 cm)  • Negative: Skin is split open or gaping (length > 1/2 inch or 12 mm)  • Negative: Bleeding won't stop after 10 minutes of direct pressure (using correct technique)  • Negative: One or two 'black eyes' (bruising, purple color of eyelids)  • Negative: Taking Coumadin (warfarin) or other strong blood thinner, or known bleeding disorder (e.g., thrombocytopenia)  • Negative: Age over 65 years with and area of head swelling or bruise  • Negative: Sounds like a serious injury to the triager    Answer Assessment - Initial Assessment Questions  1. MECHANISM:  "\"How did the injury happen?\" For falls, ask: \"What height did you fall from?\" and \"What surface did you fall against?\"       fall  2. ONSET: \"When did the injury happen?\" (Minutes or hours ago)       trip  3. NEUROLOGIC SYMPTOMS: \"Was there any loss of consciousness?\" \"Are there any other neurological symptoms?\"       no  4. MENTAL STATUS: \"Does the person know who he is, who you are, and where he is?\"       alert  5. LOCATION: \"What part of the head was hit?\"       Back of head  6. SCALP APPEARANCE: \"What does the scalp look like? Is it bleeding now?\" If so, ask: \"Is it difficult to stop?\"       red  7. SIZE: For cuts, bruises, or swelling, ask: \"How large is it?\" (e.g., inches or centimeters)       unknown  8. PAIN: \"Is there any pain?\" If so, ask: \"How bad is it?\"  (e.g., Scale 1-10; or mild, moderate, severe)      Headache, caffeine  9. TETANUS: For any breaks in the skin, ask: \"When was the last tetanus booster?\"      yes  10. OTHER SYMPTOMS: \"Do you have any other symptoms?\" (e.g., neck pain, vomiting)        no  11. PREGNANCY: \"Is there any chance you are pregnant?\" \"When was your last menstrual period?\"        no    Protocols used: HEAD INJURY-A-OH    "

## 2020-09-30 NOTE — TELEPHONE ENCOUNTER
Regarding: PT DECLINED EMERGENCY ROOM VISIT  ----- Message from Gumaro Feldman sent at 9/30/2020  7:12 AM PDT -----  ADVISED PT TO GO TO ER DUE TO FALL INJURY WITH LOSS OF CONSCIOUSNESS

## 2020-10-06 ENCOUNTER — OFFICE VISIT (OUTPATIENT)
Dept: MEDICAL GROUP | Facility: LAB | Age: 75
End: 2020-10-06
Payer: MEDICARE

## 2020-10-06 VITALS
SYSTOLIC BLOOD PRESSURE: 116 MMHG | BODY MASS INDEX: 23.37 KG/M2 | OXYGEN SATURATION: 98 % | DIASTOLIC BLOOD PRESSURE: 68 MMHG | HEART RATE: 98 BPM | WEIGHT: 127 LBS | TEMPERATURE: 98 F | RESPIRATION RATE: 12 BRPM | HEIGHT: 62 IN
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
## 2020-10-06 DIAGNOSIS — B00.9 HERPES SIMPLEX VIRUS (HSV) INFECTION: ICD-10-CM

## 2020-10-06 DIAGNOSIS — R40.20 LOC (LOSS OF CONSCIOUSNESS) (HCC): ICD-10-CM

## 2020-10-06 PROCEDURE — 99213 OFFICE O/P EST LOW 20 MIN: CPT | Performed by: FAMILY MEDICINE

## 2020-10-06 RX ORDER — ACYCLOVIR 800 MG/1
800 TABLET ORAL 2 TIMES DAILY
Qty: 30 TAB | Refills: 0 | Status: SHIPPED | OUTPATIENT
Start: 2020-10-06 | End: 2020-11-10 | Stop reason: SDUPTHER

## 2020-10-06 ASSESSMENT — FIBROSIS 4 INDEX: FIB4 SCORE: 1.62

## 2020-10-06 NOTE — PROGRESS NOTES
"Subjective:     CC: Fall    HPI:   Joie presents today to discuss fall.  She is not sure exactly what happened but about 10 days ago she may have had a fall with loss of consciousness.  She does not normally drink but she had a martini, may have fallen at some point with undetermined length of loss of consciousness.   found her on the couch.  She did call the nurse hotline the day after who recommended she go to the ER, she did not want to do this due to concern for COVID.  She does think the next day she felt fine besides some mild heart racing, fatigue and dehydration.  However, she has had some mild headaches since the incident.  She has had no changes in speech, confusion, numbness or weakness.    Medications, past medical history, allergies, and social history have been reviewed and updated.    ROS:  See HPI    Objective:       Exam:  /68 (BP Location: Right arm, Patient Position: Sitting, BP Cuff Size: Adult)   Pulse 98   Temp 36.7 °C (98 °F)   Resp 12   Ht 1.575 m (5' 2\")   Wt 57.6 kg (127 lb)   SpO2 98%   BMI 23.23 kg/m²  Body mass index is 23.23 kg/m².    Constitutional: Alert. Well appearing. No distress.  Skin: Warm, dry, good turgor, no visible rashes.  Eye: Equal, round and reactive to light, conjunctiva clear, lids normal.  ENMT: Moist mucous membranes. Normal dentition.  Respiratory: Normal effort. Lungs are clear to auscultation bilaterally.  Cardiovascular: Regular rate and rhythm. Normal S1/S2. No murmurs, rubs or gallops.   Neuro: Cranial nerves II through XII are intact to exam.  Moves all 4 extremities.  Sensation is grossly intact to all 4 extremities.  Psych: Answers questions appropriately. Normal affect and mood.      Assessment & Plan:     75 y.o. female with the following -     1. Fall, initial encounter  2. LOC (loss of consciousness) (HCC)  History is not clear but she may have had a fall with possible loss of consciousness about 10 days ago.  She has had some mild " headaches since then.  Nurse triage had recommended ER but she had wanted to avoid this.  Normal neuro exam today but given risk factors and unclear history I do think this warrants CT to rule out subdural hematoma.  - CT-HEAD W/O; Future    3. Herpes simplex virus (HSV) infection  Needs refill of acyclovir.  - acyclovir (ZOVIRAX) 800 MG Tab; Take 1 Tab by mouth 2 times a day.  Dispense: 30 Tab; Refill: 0    Please note that this note was created using voice recognition software.

## 2020-10-08 ENCOUNTER — HOSPITAL ENCOUNTER (OUTPATIENT)
Dept: RADIOLOGY | Facility: MEDICAL CENTER | Age: 75
End: 2020-10-08
Attending: FAMILY MEDICINE
Payer: MEDICARE

## 2020-10-08 DIAGNOSIS — R40.20 LOC (LOSS OF CONSCIOUSNESS) (HCC): ICD-10-CM
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
## 2020-10-08 PROCEDURE — 70450 CT HEAD/BRAIN W/O DYE: CPT

## 2020-10-14 ENCOUNTER — PATIENT MESSAGE (OUTPATIENT)
Dept: MEDICAL GROUP | Facility: LAB | Age: 75
End: 2020-10-14

## 2020-11-10 ENCOUNTER — OFFICE VISIT (OUTPATIENT)
Dept: MEDICAL GROUP | Facility: LAB | Age: 75
End: 2020-11-10
Payer: MEDICARE

## 2020-11-10 VITALS
WEIGHT: 133 LBS | HEIGHT: 61 IN | TEMPERATURE: 99.9 F | BODY MASS INDEX: 25.11 KG/M2 | SYSTOLIC BLOOD PRESSURE: 104 MMHG | RESPIRATION RATE: 16 BRPM | HEART RATE: 90 BPM | OXYGEN SATURATION: 97 % | DIASTOLIC BLOOD PRESSURE: 64 MMHG

## 2020-11-10 DIAGNOSIS — N28.1 ACQUIRED CYST OF KIDNEY: ICD-10-CM

## 2020-11-10 DIAGNOSIS — M43.10 SPONDYLOLISTHESIS, UNSPECIFIED SPINAL REGION: ICD-10-CM

## 2020-11-10 DIAGNOSIS — B00.9 HERPES SIMPLEX VIRUS (HSV) INFECTION: ICD-10-CM

## 2020-11-10 DIAGNOSIS — R10.11 RIGHT UPPER QUADRANT ABDOMINAL PAIN: ICD-10-CM

## 2020-11-10 PROCEDURE — 99214 OFFICE O/P EST MOD 30 MIN: CPT | Performed by: FAMILY MEDICINE

## 2020-11-10 RX ORDER — ACYCLOVIR 800 MG/1
800 TABLET ORAL 2 TIMES DAILY
Qty: 90 TAB | Refills: 0 | Status: SHIPPED | OUTPATIENT
Start: 2020-11-10 | End: 2021-11-03 | Stop reason: SDUPTHER

## 2020-11-10 ASSESSMENT — FIBROSIS 4 INDEX: FIB4 SCORE: 1.62

## 2020-11-10 NOTE — PROGRESS NOTES
Subjective:     Chief Complaint   Patient presents with   • Other     spondulosis pain, and discuss liver        Joie Hart is a 75 y.o. female here today for evaluation and management of:    Joie is concerned because on 12/30/2017 she had a CT of the abdomen and she thought there was mention of a liver lesion.  I have looked at the reports as well as the films and do not see this.  She had labs in September and she is worried because there is been a slight trending increase in her alkaline phosphatase, AST and ALT but these are still well within the normal limits.  She is concerned that she may have MOTA.  She is having occasional right upper quadrant pain that sometimes radiates to her back.  She has not had a follow-up imaging study    Patient also wants to see a rheumatologist or pain management about her spondylolisthesis.  She has been seeing a chiropractor who made a referral and she would like to wait and see if that comes through.  Allergies   Allergen Reactions   • Codeine Rash     Rash all over           Current medicines (including changes today)  Current Outpatient Medications   Medication Sig Dispense Refill   • acyclovir (ZOVIRAX) 800 MG Tab Take 1 Tab by mouth 2 times a day. 90 Tab 0   • MAGNESIUM PO Take  by mouth every day.     • diphenhydrAMINE-APAP, sleep, (TYLENOL PM EXTRA STRENGTH)  MG Tab Take  by mouth.     • amitriptyline (ELAVIL) 150 MG Tab TAKE 1 TABLET BY MOUTH AT BEDTIME AS NEEDED 90 Tab 0   • diphenoxylate-atropine (LOMOTIL) 2.5-0.025 MG Tab Take 1 Tab by mouth 4 times a day as needed for Diarrhea.     • tobramycin-dexamethasone (TOBRADEX) 0.3-0.1 % Ointment APPLY TO EYE LID MARGINS D UTD     • acetaminophen (TYLENOL) 500 MG Tab Take 1,000 mg by mouth every 6 hours as needed for Moderate Pain.     • tramadol (ULTRAM) 50 MG Tab Take 50 mg by mouth every 8 hours as needed for Moderate Pain.     • levothyroxine (SYNTHROID) 125 MCG Tab Take 62.5 mcg by mouth Every morning  on an empty stomach.     • hydrOXYzine HCl (ATARAX) 25 MG Tab Take 1 Tab by mouth every 24 hours as needed for Itching. 30 Tab 1   • cyanocobalamin (VITAMIN B-12) 500 MCG Tab Take 500 mcg by mouth every day.     • therapeutic multivitamin-minerals (THERAGRAN-M) Tab Take 1 Tab by mouth every day.     • Probiotic Product (PROBIOTIC DAILY PO) Take 2 Caps by mouth every evening.       No current facility-administered medications for this visit.        She  has a past medical history of Allergy, Anemia, Anxiety, Cataract, Chronic back pain, Depression (2009), Diverticulitis, Glaucoma, Head injury, closed (3/6/16), HSV infection, IBD (inflammatory bowel disease), Migraine, Pelvic fracture (HCC) (1976), Spondylisthesis, Thyroid disease, and Vaginal vault prolapse (2006).    Patient Active Problem List    Diagnosis Date Noted   • Nontoxic single thyroid nodule 09/14/2020   • Anxiety 06/24/2019   • Hyponatremia 06/18/2019   • Breast pain, left 03/12/2019   • Chronic bilateral thoracic back pain 03/12/2019   • Vitamin D deficiency 03/12/2019   • Chronic midline low back pain with bilateral sciatica 01/11/2019   • Pain of cervical spine 06/26/2018   • Chronic diarrhea of unknown origin 06/07/2018   • Chronic pain syndrome 03/27/2018   • White matter abnormality on MRI of brain 01/09/2018   • Chronic headaches 10/05/2017   • Chronic non-specific white matter lesions on MRI 10/05/2017   • Chronic left-sided low back pain with sciatica 07/25/2017   • Seasonal allergic rhinitis due to pollen 05/26/2017   • B12 deficiency 12/20/2016   • Nonintractable headache 06/17/2016   • Spondylisthesis    • Acquired cyst of kidney 03/23/2016   • Acquired hypothyroidism 03/23/2016   • Herpes simplex virus (HSV) infection 11/07/2012   • Other hyperlipidemia 11/07/2012   • Chronic urticaria 11/07/2012   • Osteopenia 11/06/2012       ROS   No fever or chills.  No nausea or vomiting.  No chest pain or palpitations.  No cough or SOB.  No pain with  "urination or hematuria.  No black or bloody stools.       Objective:     /64 (BP Location: Right arm, Patient Position: Sitting, BP Cuff Size: Adult)   Pulse 90   Temp 37.7 °C (99.9 °F) (Temporal)   Resp 16   Ht 1.549 m (5' 1\")   Wt 60.3 kg (133 lb)   SpO2 97%  Body mass index is 25.13 kg/m².   Physical Exam:  Well developed, well nourished.  Alert, oriented in no acute distress.  Eye contact is good, speech goal directed, affect calm  Eyes: conjunctiva non-injected, sclera non-icteric.  Neck Supple.  No adenopathy or masses in the neck or supraclavicular regions. No thyromegaly  Lungs: clear to auscultation bilaterally with good excursion. No wheezes or rhonchi  CV: regular rate and rhythm. No murmur  Abdomen: soft, mild right upper quadrant tenderness without rebound or guarding, no masses or organomegaly.             Assessment and Plan:   The following treatment plan was discussed    1. Acquired cyst of kidney  US-ABDOMEN COMPLETE SURVEY   2. Spondylolisthesis, unspecified spinal region     3. Right upper quadrant abdominal pain  US-ABDOMEN COMPLETE SURVEY   4. Herpes simplex virus (HSV) infection  acyclovir (ZOVIRAX) 800 MG Tab     Will get an abdominal ultrasound to assess.  Patient requested a 90-day prescription of the acyclovir which I have done.  If her chiropractic referral does not go through we can refer her to PMR.  Patient is also interested in seeing an internist because \"her problems are internal\".  I have given her names of several internists and if she establishes with one we will transfer care.  Any change or worsening of signs or symptoms, patient encouraged to follow-up or report to the emergency room for further evaluation. Patient understands and agrees.    Followup: Return if symptoms worsen or fail to improve.             "

## 2020-11-19 ENCOUNTER — PATIENT MESSAGE (OUTPATIENT)
Dept: MEDICAL GROUP | Facility: LAB | Age: 75
End: 2020-11-19

## 2020-11-21 ENCOUNTER — APPOINTMENT (OUTPATIENT)
Dept: RADIOLOGY | Facility: MEDICAL CENTER | Age: 75
End: 2020-11-21
Attending: FAMILY MEDICINE
Payer: MEDICARE

## 2020-11-23 RX ORDER — MELOXICAM 7.5 MG/1
7.5 TABLET ORAL DAILY
Qty: 30 TAB | Refills: 1 | Status: SHIPPED | OUTPATIENT
Start: 2020-11-23 | End: 2021-01-18

## 2020-12-12 ENCOUNTER — HOSPITAL ENCOUNTER (OUTPATIENT)
Dept: RADIOLOGY | Facility: MEDICAL CENTER | Age: 75
End: 2020-12-12
Attending: FAMILY MEDICINE
Payer: MEDICARE

## 2020-12-12 DIAGNOSIS — R10.11 RIGHT UPPER QUADRANT ABDOMINAL PAIN: ICD-10-CM

## 2020-12-12 DIAGNOSIS — N28.1 ACQUIRED CYST OF KIDNEY: ICD-10-CM

## 2020-12-12 PROCEDURE — 76700 US EXAM ABDOM COMPLETE: CPT

## 2021-01-04 ENCOUNTER — OFFICE VISIT (OUTPATIENT)
Dept: MEDICAL GROUP | Facility: LAB | Age: 76
End: 2021-01-04
Payer: MEDICARE

## 2021-01-04 VITALS
SYSTOLIC BLOOD PRESSURE: 110 MMHG | BODY MASS INDEX: 24.35 KG/M2 | RESPIRATION RATE: 16 BRPM | WEIGHT: 129 LBS | DIASTOLIC BLOOD PRESSURE: 64 MMHG | TEMPERATURE: 97.2 F | HEIGHT: 61 IN | HEART RATE: 95 BPM

## 2021-01-04 DIAGNOSIS — N28.89 OTHER SPECIFIED DISORDERS OF KIDNEY AND URETER: ICD-10-CM

## 2021-01-04 DIAGNOSIS — R93.89 ABNORMAL FINDING ON ULTRASOUND: ICD-10-CM

## 2021-01-04 PROCEDURE — 99214 OFFICE O/P EST MOD 30 MIN: CPT | Performed by: FAMILY MEDICINE

## 2021-01-04 ASSESSMENT — FIBROSIS 4 INDEX: FIB4 SCORE: 1.62

## 2021-01-04 NOTE — ASSESSMENT & PLAN NOTE
Ultrasound showed the following:  The left kidney measures 9.10 cm. There is an isoechoic area in the left kidney measuring 1.81 x 1.72 x 1.37 cm where a solid mass is not excluded.  Radiology recommended a CT or MRI of the area to assess.  Renal function is normal.

## 2021-01-04 NOTE — PROGRESS NOTES
Subjective:     Chief Complaint   Patient presents with   • Other     requesting MRI of left kidney   • Other     low iron levels   • Pain     lower abdominal pain   • Toe Injury     left toe       Joie Hart is a 75 y.o. female here today for evaluation and management of:    Abnormal finding on ultrasound  Ultrasound showed the following:  The left kidney measures 9.10 cm. There is an isoechoic area in the left kidney measuring 1.81 x 1.72 x 1.37 cm where a solid mass is not excluded.  Radiology recommended a CT or MRI of the area to assess.  Renal function is normal.       Allergies   Allergen Reactions   • Codeine Rash     Rash all over           Current medicines (including changes today)  Current Outpatient Medications   Medication Sig Dispense Refill   • amitriptyline (ELAVIL) 150 MG Tab TAKE 1 TABLET BY MOUTH AT BEDTIME AS NEEDED 90 Tab 3   • meloxicam (MOBIC) 7.5 MG Tab Take 1 Tab by mouth every day. 30 Tab 1   • acyclovir (ZOVIRAX) 800 MG Tab Take 1 Tab by mouth 2 times a day. 90 Tab 0   • MAGNESIUM PO Take  by mouth every day.     • diphenhydrAMINE-APAP, sleep, (TYLENOL PM EXTRA STRENGTH)  MG Tab Take  by mouth.     • diphenoxylate-atropine (LOMOTIL) 2.5-0.025 MG Tab Take 1 Tab by mouth 4 times a day as needed for Diarrhea.     • tobramycin-dexamethasone (TOBRADEX) 0.3-0.1 % Ointment APPLY TO EYE LID MARGINS D UTD     • acetaminophen (TYLENOL) 500 MG Tab Take 1,000 mg by mouth every 6 hours as needed for Moderate Pain.     • tramadol (ULTRAM) 50 MG Tab Take 50 mg by mouth every 8 hours as needed for Moderate Pain.     • levothyroxine (SYNTHROID) 125 MCG Tab Take 62.5 mcg by mouth Every morning on an empty stomach.     • hydrOXYzine HCl (ATARAX) 25 MG Tab Take 1 Tab by mouth every 24 hours as needed for Itching. 30 Tab 1   • cyanocobalamin (VITAMIN B-12) 500 MCG Tab Take 500 mcg by mouth every day.     • therapeutic multivitamin-minerals (THERAGRAN-M) Tab Take 1 Tab by mouth every day.     •  "Probiotic Product (PROBIOTIC DAILY PO) Take 2 Caps by mouth every evening.       No current facility-administered medications for this visit.        She  has a past medical history of Allergy, Anemia, Anxiety, Cataract, Chronic back pain, Depression (2009), Diverticulitis, Glaucoma, Head injury, closed (3/6/16), HSV infection, IBD (inflammatory bowel disease), Migraine, Pelvic fracture (HCC) (1976), Spondylisthesis, Thyroid disease, and Vaginal vault prolapse (2006).    Patient Active Problem List    Diagnosis Date Noted   • Abnormal finding on ultrasound 01/04/2021   • Nontoxic single thyroid nodule 09/14/2020   • Anxiety 06/24/2019   • Hyponatremia 06/18/2019   • Breast pain, left 03/12/2019   • Chronic bilateral thoracic back pain 03/12/2019   • Vitamin D deficiency 03/12/2019   • Chronic midline low back pain with bilateral sciatica 01/11/2019   • Pain of cervical spine 06/26/2018   • Chronic diarrhea of unknown origin 06/07/2018   • Chronic pain syndrome 03/27/2018   • White matter abnormality on MRI of brain 01/09/2018   • Chronic headaches 10/05/2017   • Chronic non-specific white matter lesions on MRI 10/05/2017   • Chronic left-sided low back pain with sciatica 07/25/2017   • Seasonal allergic rhinitis due to pollen 05/26/2017   • B12 deficiency 12/20/2016   • Nonintractable headache 06/17/2016   • Spondylisthesis    • Acquired cyst of kidney 03/23/2016   • Acquired hypothyroidism 03/23/2016   • Herpes simplex virus (HSV) infection 11/07/2012   • Other hyperlipidemia 11/07/2012   • Chronic urticaria 11/07/2012   • Osteopenia 11/06/2012       ROS   No fever or chills.  No nausea or vomiting.  No chest pain or palpitations.  No cough or SOB.  No pain with urination or hematuria.  No black or bloody stools.       Objective:     /64 (BP Location: Right arm, Patient Position: Sitting, BP Cuff Size: Adult)   Pulse 95   Temp 36.2 °C (97.2 °F) (Temporal)   Resp 16   Ht 1.549 m (5' 1\")   Wt 58.5 kg (129 " lb)  Body mass index is 24.37 kg/m².   Physical Exam:  Well developed, well nourished.  Alert, oriented in no acute distress.  Eye contact is good, speech goal directed, affect calm  Eyes: conjunctiva non-injected, sclera non-icteric.  Neck Supple.  No adenopathy or masses in the neck or supraclavicular regions. No thyromegaly  Lungs: clear to auscultation bilaterally with good excursion. No wheezes or rhonchi  CV: regular rate and rhythm. No murmur  Abdomen: soft, nontender, no masses or organomegaly.  No rebound or guarding  Ext: no edema, color normal, vascularity normal, temperature normal.  Left toenail has a deformity from a old injury          Assessment and Plan:   The following treatment plan was discussed    1. Abnormal finding on ultrasound  MRI of the abdomen with and without contrast to assess.  Patient will get a renal function prior to receiving contrast.  Await results  - MR-ABDOMEN-WITH & W/O; Future  - Renal Function Panel; Future    2. Other specified disorders of kidney and ureter  MRI of the abdomen with and without contrast to assess.  Patient will get a renal function prior to receiving contrast.  Await results  - MR-ABDOMEN-WITH & W/O; Future  - Renal Function Panel; Future    Any change or worsening of signs or symptoms, patient encouraged to follow-up or report to the emergency room for further evaluation. Patient understands and agrees.    Followup: Return if symptoms worsen or fail to improve.

## 2021-01-15 DIAGNOSIS — Z23 NEED FOR VACCINATION: ICD-10-CM

## 2021-01-18 RX ORDER — MELOXICAM 7.5 MG/1
TABLET ORAL
Qty: 30 TAB | Refills: 1 | Status: SHIPPED | OUTPATIENT
Start: 2021-01-18 | End: 2021-03-26

## 2021-01-18 NOTE — TELEPHONE ENCOUNTER
Received request via: Pharmacy    Was the patient seen in the last year in this department? Yes    Does the patient have an active prescription (recently filled or refills available) for medication(s) requested? No     MELOXICAM 7.5MG TABLETS

## 2021-01-22 ENCOUNTER — HOSPITAL ENCOUNTER (OUTPATIENT)
Dept: LAB | Facility: MEDICAL CENTER | Age: 76
End: 2021-01-22
Attending: INTERNAL MEDICINE
Payer: MEDICARE

## 2021-01-22 LAB
T4 FREE SERPL-MCNC: 1.25 NG/DL (ref 0.93–1.7)
TSH SERPL DL<=0.005 MIU/L-ACNC: 1.95 UIU/ML (ref 0.38–5.33)

## 2021-01-22 PROCEDURE — 36415 COLL VENOUS BLD VENIPUNCTURE: CPT

## 2021-01-22 PROCEDURE — 84443 ASSAY THYROID STIM HORMONE: CPT

## 2021-01-22 PROCEDURE — 84439 ASSAY OF FREE THYROXINE: CPT

## 2021-01-29 ENCOUNTER — APPOINTMENT (OUTPATIENT)
Dept: LAB | Facility: MEDICAL CENTER | Age: 76
End: 2021-01-29
Attending: FAMILY MEDICINE
Payer: MEDICARE

## 2021-03-26 NOTE — TELEPHONE ENCOUNTER
Received request via: Pharmacy    Was the patient seen in the last year in this department? Yes  1/4/21  Does the patient have an active prescription (recently filled or refills available) for medication(s) requested? No

## 2021-03-29 RX ORDER — MELOXICAM 7.5 MG/1
TABLET ORAL
Qty: 30 TABLET | Refills: 1 | Status: SHIPPED | OUTPATIENT
Start: 2021-03-29 | End: 2021-06-01

## 2021-04-01 ENCOUNTER — HOSPITAL ENCOUNTER (OUTPATIENT)
Dept: LAB | Facility: MEDICAL CENTER | Age: 76
End: 2021-04-01
Attending: INTERNAL MEDICINE
Payer: MEDICARE

## 2021-04-01 LAB
T4 FREE SERPL-MCNC: 1.32 NG/DL (ref 0.93–1.7)
TSH SERPL DL<=0.005 MIU/L-ACNC: 2.67 UIU/ML (ref 0.38–5.33)

## 2021-04-01 PROCEDURE — 84439 ASSAY OF FREE THYROXINE: CPT

## 2021-04-01 PROCEDURE — 84443 ASSAY THYROID STIM HORMONE: CPT

## 2021-04-01 PROCEDURE — 36415 COLL VENOUS BLD VENIPUNCTURE: CPT

## 2021-06-01 RX ORDER — MELOXICAM 7.5 MG/1
TABLET ORAL
Qty: 30 TABLET | Refills: 1 | Status: SHIPPED | OUTPATIENT
Start: 2021-06-01 | End: 2021-07-27

## 2021-06-01 NOTE — TELEPHONE ENCOUNTER
Received request via: Pharmacy    Was the patient seen in the last year in this department? Yes  LOV 01/04/2021  Does the patient have an active prescription (recently filled or refills available) for medication(s) requested? No

## 2021-07-08 ENCOUNTER — HOSPITAL ENCOUNTER (OUTPATIENT)
Dept: LAB | Facility: MEDICAL CENTER | Age: 76
End: 2021-07-08
Attending: INTERNAL MEDICINE
Payer: MEDICARE

## 2021-07-08 LAB
T4 FREE SERPL-MCNC: 1.31 NG/DL (ref 0.93–1.7)
TSH SERPL DL<=0.005 MIU/L-ACNC: 1.58 UIU/ML (ref 0.38–5.33)

## 2021-07-08 PROCEDURE — 84439 ASSAY OF FREE THYROXINE: CPT

## 2021-07-08 PROCEDURE — 36415 COLL VENOUS BLD VENIPUNCTURE: CPT

## 2021-07-08 PROCEDURE — 84443 ASSAY THYROID STIM HORMONE: CPT

## 2021-07-27 NOTE — TELEPHONE ENCOUNTER
Received request via: Pharmacy    Was the patient seen in the last year in this department? Yes 1/4/2021    Does the patient have an active prescription (recently filled or refills available) for medication(s) requested? No

## 2021-07-28 RX ORDER — MELOXICAM 7.5 MG/1
TABLET ORAL
Qty: 30 TABLET | Refills: 3 | Status: SHIPPED | OUTPATIENT
Start: 2021-07-28 | End: 2021-12-02

## 2021-11-03 ENCOUNTER — OFFICE VISIT (OUTPATIENT)
Dept: MEDICAL GROUP | Facility: LAB | Age: 76
End: 2021-11-03
Payer: MEDICARE

## 2021-11-03 VITALS
RESPIRATION RATE: 12 BRPM | TEMPERATURE: 98.7 F | DIASTOLIC BLOOD PRESSURE: 70 MMHG | SYSTOLIC BLOOD PRESSURE: 122 MMHG | OXYGEN SATURATION: 98 % | HEART RATE: 86 BPM | HEIGHT: 61 IN | WEIGHT: 128 LBS | BODY MASS INDEX: 24.17 KG/M2

## 2021-11-03 DIAGNOSIS — M85.80 OSTEOPENIA, UNSPECIFIED LOCATION: ICD-10-CM

## 2021-11-03 DIAGNOSIS — M43.10 SPONDYLOLISTHESIS, UNSPECIFIED SPINAL REGION: ICD-10-CM

## 2021-11-03 DIAGNOSIS — E53.8 B12 DEFICIENCY: ICD-10-CM

## 2021-11-03 DIAGNOSIS — R73.01 ELEVATED FASTING BLOOD SUGAR: ICD-10-CM

## 2021-11-03 DIAGNOSIS — N28.1 ACQUIRED CYST OF KIDNEY: ICD-10-CM

## 2021-11-03 DIAGNOSIS — E78.49 OTHER HYPERLIPIDEMIA: ICD-10-CM

## 2021-11-03 DIAGNOSIS — E03.9 ACQUIRED HYPOTHYROIDISM: ICD-10-CM

## 2021-11-03 DIAGNOSIS — Z86.16 HISTORY OF COVID-19: ICD-10-CM

## 2021-11-03 DIAGNOSIS — Z12.31 ENCOUNTER FOR SCREENING MAMMOGRAM FOR BREAST CANCER: ICD-10-CM

## 2021-11-03 DIAGNOSIS — Z00.00 MEDICARE ANNUAL WELLNESS VISIT, SUBSEQUENT: ICD-10-CM

## 2021-11-03 DIAGNOSIS — B00.9 HERPES SIMPLEX VIRUS (HSV) INFECTION: ICD-10-CM

## 2021-11-03 DIAGNOSIS — Z13.0 SCREENING FOR DEFICIENCY ANEMIA: ICD-10-CM

## 2021-11-03 DIAGNOSIS — E55.9 VITAMIN D DEFICIENCY: ICD-10-CM

## 2021-11-03 DIAGNOSIS — G89.29 CHRONIC LEFT-SIDED LOW BACK PAIN WITH BILATERAL SCIATICA: ICD-10-CM

## 2021-11-03 DIAGNOSIS — R93.89 ABNORMAL FINDING ON ULTRASOUND: ICD-10-CM

## 2021-11-03 DIAGNOSIS — M54.42 CHRONIC LEFT-SIDED LOW BACK PAIN WITH BILATERAL SCIATICA: ICD-10-CM

## 2021-11-03 DIAGNOSIS — M54.41 CHRONIC LEFT-SIDED LOW BACK PAIN WITH BILATERAL SCIATICA: ICD-10-CM

## 2021-11-03 PROCEDURE — G0439 PPPS, SUBSEQ VISIT: HCPCS | Performed by: FAMILY MEDICINE

## 2021-11-03 RX ORDER — LEVOTHYROXINE SODIUM 0.07 MG/1
75 TABLET ORAL
COMMUNITY
End: 2023-12-07

## 2021-11-03 RX ORDER — ACYCLOVIR 800 MG/1
800 TABLET ORAL 2 TIMES DAILY
Qty: 90 TABLET | Refills: 1 | Status: SHIPPED | OUTPATIENT
Start: 2021-11-03 | End: 2023-12-07 | Stop reason: SDUPTHER

## 2021-11-03 RX ORDER — BUTALBITAL AND ACETAMINOPHEN 25; 325 MG/1; MG/1
TABLET ORAL
COMMUNITY

## 2021-11-03 RX ORDER — MULTIVIT WITH MINERALS/LUTEIN
TABLET ORAL
COMMUNITY

## 2021-11-03 RX ORDER — CHOLECALCIFEROL (VITAMIN D3) 125 MCG
CAPSULE ORAL
COMMUNITY

## 2021-11-03 RX ORDER — TRAMADOL HYDROCHLORIDE 50 MG/1
50 TABLET ORAL EVERY 8 HOURS PRN
Qty: 60 TABLET | Refills: 0 | Status: SHIPPED | OUTPATIENT
Start: 2021-11-03 | End: 2021-12-03

## 2021-11-03 ASSESSMENT — FIBROSIS 4 INDEX: FIB4 SCORE: 1.65

## 2021-11-03 ASSESSMENT — ENCOUNTER SYMPTOMS: GENERAL WELL-BEING: GOOD

## 2021-11-03 ASSESSMENT — ACTIVITIES OF DAILY LIVING (ADL): BATHING_REQUIRES_ASSISTANCE: 0

## 2021-11-03 ASSESSMENT — PATIENT HEALTH QUESTIONNAIRE - PHQ9: CLINICAL INTERPRETATION OF PHQ2 SCORE: 0
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
## 2021-12-02 RX ORDER — MELOXICAM 7.5 MG/1
TABLET ORAL
Qty: 30 TABLET | Refills: 3 | Status: SHIPPED | OUTPATIENT
Start: 2021-12-02 | End: 2022-04-05

## 2021-12-08 ENCOUNTER — HOSPITAL ENCOUNTER (OUTPATIENT)
Dept: LAB | Facility: MEDICAL CENTER | Age: 76
End: 2021-12-08
Attending: FAMILY MEDICINE
Payer: MEDICARE

## 2021-12-08 DIAGNOSIS — E53.8 B12 DEFICIENCY: ICD-10-CM

## 2021-12-08 DIAGNOSIS — R93.89 ABNORMAL FINDING ON ULTRASOUND: ICD-10-CM

## 2021-12-08 DIAGNOSIS — E78.49 OTHER HYPERLIPIDEMIA: ICD-10-CM

## 2021-12-08 DIAGNOSIS — E55.9 VITAMIN D DEFICIENCY: ICD-10-CM

## 2021-12-08 DIAGNOSIS — N28.89 OTHER SPECIFIED DISORDERS OF KIDNEY AND URETER: ICD-10-CM

## 2021-12-08 DIAGNOSIS — R73.01 ELEVATED FASTING BLOOD SUGAR: ICD-10-CM

## 2021-12-08 DIAGNOSIS — Z13.0 SCREENING FOR DEFICIENCY ANEMIA: ICD-10-CM

## 2021-12-08 LAB
ALBUMIN SERPL BCP-MCNC: 4.4 G/DL (ref 3.2–4.9)
ALBUMIN/GLOB SERPL: 1.8 G/DL
ALP SERPL-CCNC: 108 U/L (ref 30–99)
ALT SERPL-CCNC: 26 U/L (ref 2–50)
ANION GAP SERPL CALC-SCNC: 10 MMOL/L (ref 7–16)
AST SERPL-CCNC: 24 U/L (ref 12–45)
BASOPHILS # BLD AUTO: 0.9 % (ref 0–1.8)
BASOPHILS # BLD: 0.04 K/UL (ref 0–0.12)
BILIRUB SERPL-MCNC: 0.5 MG/DL (ref 0.1–1.5)
BUN SERPL-MCNC: 8 MG/DL (ref 8–22)
CALCIUM SERPL-MCNC: 9.3 MG/DL (ref 8.4–10.2)
CHLORIDE SERPL-SCNC: 99 MMOL/L (ref 96–112)
CHOLEST SERPL-MCNC: 217 MG/DL (ref 100–199)
CO2 SERPL-SCNC: 27 MMOL/L (ref 20–33)
CREAT SERPL-MCNC: 0.66 MG/DL (ref 0.5–1.4)
EOSINOPHIL # BLD AUTO: 0 K/UL (ref 0–0.51)
EOSINOPHIL NFR BLD: 0 % (ref 0–6.9)
ERYTHROCYTE [DISTWIDTH] IN BLOOD BY AUTOMATED COUNT: 43.3 FL (ref 35.9–50)
EST. AVERAGE GLUCOSE BLD GHB EST-MCNC: 103 MG/DL
FASTING STATUS PATIENT QL REPORTED: NORMAL
GLOBULIN SER CALC-MCNC: 2.4 G/DL (ref 1.9–3.5)
GLUCOSE SERPL-MCNC: 97 MG/DL (ref 65–99)
HBA1C MFR BLD: 5.2 % (ref 4–5.6)
HCT VFR BLD AUTO: 40.3 % (ref 37–47)
HDLC SERPL-MCNC: 100 MG/DL
HGB BLD-MCNC: 13.3 G/DL (ref 12–16)
IMM GRANULOCYTES # BLD AUTO: 0 K/UL (ref 0–0.11)
IMM GRANULOCYTES NFR BLD AUTO: 0 % (ref 0–0.9)
LDLC SERPL CALC-MCNC: 103 MG/DL
LYMPHOCYTES # BLD AUTO: 2.03 K/UL (ref 1–4.8)
LYMPHOCYTES NFR BLD: 47.3 % (ref 22–41)
MCH RBC QN AUTO: 32.1 PG (ref 27–33)
MCHC RBC AUTO-ENTMCNC: 33 G/DL (ref 33.6–35)
MCV RBC AUTO: 97.3 FL (ref 81.4–97.8)
MONOCYTES # BLD AUTO: 0.29 K/UL (ref 0–0.85)
MONOCYTES NFR BLD AUTO: 6.8 % (ref 0–13.4)
NEUTROPHILS # BLD AUTO: 1.93 K/UL (ref 2–7.15)
NEUTROPHILS NFR BLD: 45 % (ref 44–72)
NRBC # BLD AUTO: 0 K/UL
NRBC BLD-RTO: 0 /100 WBC
PHOSPHATE SERPL-MCNC: 3.5 MG/DL (ref 2.5–4.5)
PLATELET # BLD AUTO: 193 K/UL (ref 164–446)
PMV BLD AUTO: 9.8 FL (ref 9–12.9)
POTASSIUM SERPL-SCNC: 4.3 MMOL/L (ref 3.6–5.5)
PROT SERPL-MCNC: 6.8 G/DL (ref 6–8.2)
RBC # BLD AUTO: 4.14 M/UL (ref 4.2–5.4)
SODIUM SERPL-SCNC: 136 MMOL/L (ref 135–145)
TRIGL SERPL-MCNC: 68 MG/DL (ref 0–149)
VIT B12 SERPL-MCNC: 2534 PG/ML (ref 211–911)
WBC # BLD AUTO: 4.3 K/UL (ref 4.8–10.8)

## 2021-12-08 PROCEDURE — 80053 COMPREHEN METABOLIC PANEL: CPT

## 2021-12-08 PROCEDURE — 80061 LIPID PANEL: CPT

## 2021-12-08 PROCEDURE — 82306 VITAMIN D 25 HYDROXY: CPT

## 2021-12-08 PROCEDURE — 36415 COLL VENOUS BLD VENIPUNCTURE: CPT

## 2021-12-08 PROCEDURE — 84100 ASSAY OF PHOSPHORUS: CPT

## 2021-12-08 PROCEDURE — 82607 VITAMIN B-12: CPT

## 2021-12-08 PROCEDURE — 85025 COMPLETE CBC W/AUTO DIFF WBC: CPT

## 2021-12-08 PROCEDURE — 83036 HEMOGLOBIN GLYCOSYLATED A1C: CPT

## 2021-12-10 LAB — 25(OH)D3 SERPL-MCNC: 33 NG/ML (ref 30–80)

## 2021-12-24 RX ORDER — AMITRIPTYLINE HYDROCHLORIDE 150 MG/1
TABLET ORAL
Qty: 90 TABLET | Refills: 3 | Status: SHIPPED | OUTPATIENT
Start: 2021-12-24 | End: 2022-12-01 | Stop reason: SDUPTHER

## 2022-01-12 ENCOUNTER — HOSPITAL ENCOUNTER (OUTPATIENT)
Dept: LAB | Facility: MEDICAL CENTER | Age: 77
End: 2022-01-12
Attending: INTERNAL MEDICINE
Payer: MEDICARE

## 2022-01-12 LAB
T4 FREE SERPL-MCNC: 0.96 NG/DL (ref 0.93–1.7)
TSH SERPL DL<=0.005 MIU/L-ACNC: 2.66 UIU/ML (ref 0.38–5.33)

## 2022-01-12 PROCEDURE — 84439 ASSAY OF FREE THYROXINE: CPT

## 2022-01-12 PROCEDURE — 84443 ASSAY THYROID STIM HORMONE: CPT

## 2022-01-12 PROCEDURE — 36415 COLL VENOUS BLD VENIPUNCTURE: CPT

## 2022-01-18 ENCOUNTER — HOSPITAL ENCOUNTER (OUTPATIENT)
Dept: RADIOLOGY | Facility: MEDICAL CENTER | Age: 77
End: 2022-01-18
Attending: FAMILY MEDICINE
Payer: MEDICARE

## 2022-01-18 DIAGNOSIS — Z12.31 ENCOUNTER FOR SCREENING MAMMOGRAM FOR BREAST CANCER: ICD-10-CM

## 2022-01-18 PROCEDURE — 77063 BREAST TOMOSYNTHESIS BI: CPT

## 2022-02-22 ENCOUNTER — OFFICE VISIT (OUTPATIENT)
Dept: MEDICAL GROUP | Facility: LAB | Age: 77
End: 2022-02-22
Payer: MEDICARE

## 2022-02-22 VITALS
SYSTOLIC BLOOD PRESSURE: 120 MMHG | WEIGHT: 129 LBS | RESPIRATION RATE: 16 BRPM | HEART RATE: 90 BPM | DIASTOLIC BLOOD PRESSURE: 68 MMHG | OXYGEN SATURATION: 97 % | TEMPERATURE: 98.3 F | HEIGHT: 61 IN | BODY MASS INDEX: 24.35 KG/M2

## 2022-02-22 DIAGNOSIS — N28.89 RENAL MASS: ICD-10-CM

## 2022-02-22 DIAGNOSIS — R79.89 HIGH SERUM VITAMIN B12: ICD-10-CM

## 2022-02-22 DIAGNOSIS — N28.89 OTHER SPECIFIED DISORDERS OF KIDNEY AND URETER: ICD-10-CM

## 2022-02-22 PROCEDURE — 99214 OFFICE O/P EST MOD 30 MIN: CPT | Performed by: PHYSICIAN ASSISTANT

## 2022-02-22 ASSESSMENT — FIBROSIS 4 INDEX: FIB4 SCORE: 1.83

## 2022-02-22 NOTE — PROGRESS NOTES
Subjective:     CC: F/u imaging and labs    HPI:   Joie here today with     Pt reports in 2016 she had an US done which showed a 5mm renal cyst.    CT dated 12/30/2017 left kidney focal area of volume loss consistent with scarring    Recent abdomen US 12/2020 showed 1.18 x 1.  Seven 2 x 1.37 cm isoechoic region of the kidney.  Follow-up CT or MRI was advised    B12 oversupplementation  Previously taking 5000mcg qd  Elevated b12 level, pt d/c supplementation in december.  Patient would like to recheck B12    ROS:  Gen: no fevers/chills, no changes in weight  Eyes: no changes in vision  ENT: no sore throat, no hearing loss, no bloody nose  Pulm: no sob, no cough  CV: no chest pain, no palpitations  GI: no nausea/vomiting, no diarrhea  : no dysuria  MSk: no myalgias  Skin: no rash  Neuro: no headaches, no numbness/tingling  Heme/Lymph: no easy bruising    Current Outpatient Medications Ordered in Epic   Medication Sig Dispense Refill   • amitriptyline (ELAVIL) 150 MG Tab TAKE 1 TABLET BY MOUTH AT BEDTIME AS NEEDED 90 Tablet 3   • meloxicam (MOBIC) 7.5 MG Tab TAKE 1 TABLET BY MOUTH EVERY DAY 30 Tablet 3   • levothyroxine (SYNTHROID) 75 MCG Tab Take 75 mcg by mouth every morning on an empty stomach.     • Cholecalciferol (VITAMIN D) 125 MCG (5000 UT) Cap Take  by mouth.     • Ascorbic Acid (VITAMIN C) 1000 MG Tab Take  by mouth.     • NON SPECIFIED Homeopathic Immune system     • Butalbital-Acetaminophen  MG Tab Take  by mouth.     • acyclovir (ZOVIRAX) 800 MG Tab Take 1 Tablet by mouth 2 times a day. 90 Tablet 1   • diphenhydrAMINE-APAP, sleep, (TYLENOL PM EXTRA STRENGTH)  MG Tab Take  by mouth.     • acetaminophen (TYLENOL) 500 MG Tab Take 1,000 mg by mouth every 6 hours as needed for Moderate Pain.     • hydrOXYzine HCl (ATARAX) 25 MG Tab Take 1 Tab by mouth every 24 hours as needed for Itching. 30 Tab 1   • cyanocobalamin (VITAMIN B-12) 500 MCG Tab Take 500 mcg by mouth every day.     • therapeutic  "multivitamin-minerals (THERAGRAN-M) Tab Take 1 Tab by mouth every day.       No current [facility]-ordered facility-administered medications on file.         Objective:     Exam:  /68   Pulse 90   Temp 36.8 °C (98.3 °F)   Resp 16   Ht 1.549 m (5' 1\")   Wt 58.5 kg (129 lb)   SpO2 97%   BMI 24.37 kg/m²  Body mass index is 24.37 kg/m².    Gen: Alert and oriented, No apparent distress.  Neck: Neck is supple without lymphadenopathy.  Lungs: Normal effort, CTA bilaterally, no wheezes, rhonchi, or rales  CV: Regular rate and rhythm. No murmurs, rubs, or gallops.  Ext: No clubbing, cyanosis, edema.      Assessment & Plan:     76 y.o. female with the following -     1. Other specified disorders of kidney and ureter  2. Renal mass  MRI abdomen ordered  Follow-up pending MRI results    3. High serum vitamin B12  Serum B12 ordered      I spent a total of 20 minutes with record review, exam, communication with the patient, communication with other providers, and documentation of this encounter.      Return for After imaging.    Please note that this dictation was created using voice recognition software. I have made every reasonable attempt to correct obvious errors, but there may be errors of grammar and possibly content that I did not discover before finalizing the note.      "

## 2022-03-09 ENCOUNTER — HOSPITAL ENCOUNTER (OUTPATIENT)
Dept: LAB | Facility: MEDICAL CENTER | Age: 77
End: 2022-03-09
Attending: PHYSICIAN ASSISTANT
Payer: MEDICARE

## 2022-03-09 DIAGNOSIS — R79.89 HIGH SERUM VITAMIN B12: ICD-10-CM

## 2022-03-09 LAB — VIT B12 SERPL-MCNC: 954 PG/ML (ref 211–911)

## 2022-03-09 PROCEDURE — 82607 VITAMIN B-12: CPT

## 2022-03-09 PROCEDURE — 36415 COLL VENOUS BLD VENIPUNCTURE: CPT

## 2022-03-18 ENCOUNTER — APPOINTMENT (OUTPATIENT)
Dept: RADIOLOGY | Facility: MEDICAL CENTER | Age: 77
End: 2022-03-18
Attending: PHYSICIAN ASSISTANT
Payer: MEDICARE

## 2022-03-18 DIAGNOSIS — N28.89 RENAL MASS: ICD-10-CM

## 2022-03-18 DIAGNOSIS — N28.89 OTHER SPECIFIED DISORDERS OF KIDNEY AND URETER: ICD-10-CM

## 2022-03-18 PROCEDURE — A9576 INJ PROHANCE MULTIPACK: HCPCS | Performed by: PHYSICIAN ASSISTANT

## 2022-03-18 PROCEDURE — 700117 HCHG RX CONTRAST REV CODE 255: Performed by: PHYSICIAN ASSISTANT

## 2022-03-18 PROCEDURE — 74183 MRI ABD W/O CNTR FLWD CNTR: CPT | Mod: ME

## 2022-03-18 RX ADMIN — GADOTERIDOL 12 ML: 279.3 INJECTION, SOLUTION INTRAVENOUS at 13:35

## 2022-04-05 RX ORDER — MELOXICAM 7.5 MG/1
TABLET ORAL
Qty: 30 TABLET | Refills: 3 | Status: SHIPPED | OUTPATIENT
Start: 2022-04-05 | End: 2022-08-08

## 2022-04-05 NOTE — TELEPHONE ENCOUNTER
Received request via: Pharmacy    Was the patient seen in the last year in this department? Yes  2/22/22  Does the patient have an active prescription (recently filled or refills available) for medication(s) requested? No

## 2022-05-18 ENCOUNTER — HOSPITAL ENCOUNTER (OUTPATIENT)
Dept: LAB | Facility: MEDICAL CENTER | Age: 77
End: 2022-05-18
Attending: INTERNAL MEDICINE
Payer: MEDICARE

## 2022-05-18 LAB
T4 FREE SERPL-MCNC: 1.44 NG/DL (ref 0.93–1.7)
TSH SERPL DL<=0.005 MIU/L-ACNC: 0.4 UIU/ML (ref 0.38–5.33)

## 2022-05-18 PROCEDURE — 84443 ASSAY THYROID STIM HORMONE: CPT

## 2022-05-18 PROCEDURE — 36415 COLL VENOUS BLD VENIPUNCTURE: CPT

## 2022-05-18 PROCEDURE — 84439 ASSAY OF FREE THYROXINE: CPT

## 2022-06-13 DIAGNOSIS — R79.89 HIGH SERUM VITAMIN B12: ICD-10-CM

## 2022-06-14 ENCOUNTER — HOSPITAL ENCOUNTER (OUTPATIENT)
Dept: LAB | Facility: MEDICAL CENTER | Age: 77
End: 2022-06-14
Attending: PHYSICIAN ASSISTANT
Payer: MEDICARE

## 2022-06-14 ENCOUNTER — HOSPITAL ENCOUNTER (OUTPATIENT)
Dept: LAB | Facility: MEDICAL CENTER | Age: 77
End: 2022-06-14
Attending: INTERNAL MEDICINE
Payer: MEDICARE

## 2022-06-14 DIAGNOSIS — R79.89 HIGH SERUM VITAMIN B12: ICD-10-CM

## 2022-06-14 LAB
T4 FREE SERPL-MCNC: 1.28 NG/DL (ref 0.93–1.7)
TSH SERPL DL<=0.005 MIU/L-ACNC: 1.52 UIU/ML (ref 0.38–5.33)
VIT B12 SERPL-MCNC: 947 PG/ML (ref 211–911)

## 2022-06-14 PROCEDURE — 84439 ASSAY OF FREE THYROXINE: CPT

## 2022-06-14 PROCEDURE — 84443 ASSAY THYROID STIM HORMONE: CPT

## 2022-06-14 PROCEDURE — 82607 VITAMIN B-12: CPT

## 2022-06-14 PROCEDURE — 36415 COLL VENOUS BLD VENIPUNCTURE: CPT

## 2022-07-19 ENCOUNTER — HOSPITAL ENCOUNTER (OUTPATIENT)
Dept: LAB | Facility: MEDICAL CENTER | Age: 77
End: 2022-07-19
Attending: INTERNAL MEDICINE
Payer: MEDICARE

## 2022-07-19 LAB
T4 FREE SERPL-MCNC: 1.39 NG/DL (ref 0.93–1.7)
TSH SERPL DL<=0.005 MIU/L-ACNC: 2.01 UIU/ML (ref 0.38–5.33)

## 2022-07-19 PROCEDURE — 84439 ASSAY OF FREE THYROXINE: CPT

## 2022-07-19 PROCEDURE — 36415 COLL VENOUS BLD VENIPUNCTURE: CPT

## 2022-07-19 PROCEDURE — 84443 ASSAY THYROID STIM HORMONE: CPT

## 2022-08-08 ENCOUNTER — OFFICE VISIT (OUTPATIENT)
Dept: MEDICAL GROUP | Facility: LAB | Age: 77
End: 2022-08-08
Payer: MEDICARE

## 2022-08-08 ENCOUNTER — HOSPITAL ENCOUNTER (OUTPATIENT)
Dept: RADIOLOGY | Facility: MEDICAL CENTER | Age: 77
End: 2022-08-08
Attending: PHYSICIAN ASSISTANT
Payer: MEDICARE

## 2022-08-08 VITALS
TEMPERATURE: 98.4 F | BODY MASS INDEX: 21.34 KG/M2 | RESPIRATION RATE: 16 BRPM | OXYGEN SATURATION: 97 % | DIASTOLIC BLOOD PRESSURE: 70 MMHG | SYSTOLIC BLOOD PRESSURE: 110 MMHG | HEART RATE: 92 BPM | HEIGHT: 64 IN | WEIGHT: 125 LBS

## 2022-08-08 DIAGNOSIS — S09.90XA TRAUMATIC INJURY OF HEAD, INITIAL ENCOUNTER: ICD-10-CM

## 2022-08-08 DIAGNOSIS — R19.7 DIARRHEA, UNSPECIFIED TYPE: ICD-10-CM

## 2022-08-08 DIAGNOSIS — R29.6 FALLS FREQUENTLY: ICD-10-CM

## 2022-08-08 PROCEDURE — 70450 CT HEAD/BRAIN W/O DYE: CPT | Mod: ME

## 2022-08-08 PROCEDURE — 99214 OFFICE O/P EST MOD 30 MIN: CPT | Performed by: PHYSICIAN ASSISTANT

## 2022-08-08 RX ORDER — MELOXICAM 7.5 MG/1
TABLET ORAL
Qty: 30 TABLET | Refills: 3 | Status: SHIPPED | OUTPATIENT
Start: 2022-08-08 | End: 2022-12-01

## 2022-08-08 ASSESSMENT — FIBROSIS 4 INDEX: FIB4 SCORE: 1.85

## 2022-10-18 ENCOUNTER — HOSPITAL ENCOUNTER (OUTPATIENT)
Dept: LAB | Facility: MEDICAL CENTER | Age: 77
End: 2022-10-18
Attending: INTERNAL MEDICINE
Payer: MEDICARE

## 2022-10-18 LAB
T4 FREE SERPL-MCNC: 1.35 NG/DL (ref 0.93–1.7)
TSH SERPL DL<=0.005 MIU/L-ACNC: 1.37 UIU/ML (ref 0.38–5.33)

## 2022-10-18 PROCEDURE — 84439 ASSAY OF FREE THYROXINE: CPT

## 2022-10-18 PROCEDURE — 84443 ASSAY THYROID STIM HORMONE: CPT

## 2022-10-18 PROCEDURE — 36415 COLL VENOUS BLD VENIPUNCTURE: CPT

## 2022-11-09 ENCOUNTER — PATIENT MESSAGE (OUTPATIENT)
Dept: HEALTH INFORMATION MANAGEMENT | Facility: OTHER | Age: 77
End: 2022-11-09

## 2022-11-21 ENCOUNTER — TELEPHONE (OUTPATIENT)
Dept: MEDICAL GROUP | Facility: LAB | Age: 77
End: 2022-11-21
Payer: MEDICARE

## 2022-11-21 RX ORDER — PHENOL 1.4 %
AEROSOL, SPRAY (ML) MUCOUS MEMBRANE
COMMUNITY

## 2022-11-21 NOTE — TELEPHONE ENCOUNTER
ANNUAL WELLNESS VISIT PRE-VISIT PLANNING    1.  Reviewed notes from the last office visit: Yes    2.  If any orders were ordered or intended to be done prior to visit (i.e. 6 mos follow-up), do we have results/consult notes or has patient scheduled?          Labs - Labs were not ordered at last office visit.  Note: If patient appointment is for lab review and patient did not complete labs, check with provider if OK to reschedule patient until labs completed.         Imaging - Imaging ordered, completed and results are in chart.         Referrals - Referral ordered, patient has NOT been seen.    3.  Immunizations were updated in Epic using Reconcile Outside Information activity? Yes  4.  Patient is due for the following Health Maintenance Topics:   Health Maintenance Due   Topic Date Due    IMM HEP B VACCINE (1 of 3 - 3-dose series) Never done    COVID-19 Vaccine (1) Never done    IMM ZOSTER VACCINES (1 of 2) Never done    IMM PNEUMOCOCCAL VACCINE: 65+ Years (1 - PCV) Never done    BONE DENSITY  02/22/2022    IMM INFLUENZA (1) 09/01/2022    Annual Wellness Visit  11/04/2022   5.  Reviewed/Updated the following with patient:          Preferred Pharmacy? Yes          Preferred Lab? Yes          Preferred Communication? Yes          Allergies? Yes          Medications? YES. Was Abstract Encounter opened and chart updated? YES  6.  Care Team Updated:          DME Company (gait device, O2, CPAP, etc.): N\A          Other Specialists (eye doctor, derm, GYN, cardiology, endo, etc): N\A    7.  Patient was advised: “This is a free wellness visit. The provider will screen for medical conditions to help you stay healthy. If you have other concerns to address you may be asked to discuss these at a separate visit or there may be an additional fee.”     8.  AHA (Puls8) form printed for Provider? N/A

## 2022-11-21 NOTE — TELEPHONE ENCOUNTER
Left message for patient to call back regarding pre-visit planning. Please transfer call to [phone].

## 2022-12-01 ENCOUNTER — OFFICE VISIT (OUTPATIENT)
Dept: MEDICAL GROUP | Facility: LAB | Age: 77
End: 2022-12-01
Payer: MEDICARE

## 2022-12-01 ENCOUNTER — TELEPHONE (OUTPATIENT)
Dept: MEDICAL GROUP | Facility: LAB | Age: 77
End: 2022-12-01

## 2022-12-01 VITALS
DIASTOLIC BLOOD PRESSURE: 62 MMHG | WEIGHT: 124.2 LBS | HEIGHT: 64 IN | HEART RATE: 86 BPM | RESPIRATION RATE: 12 BRPM | TEMPERATURE: 97 F | SYSTOLIC BLOOD PRESSURE: 110 MMHG | BODY MASS INDEX: 21.21 KG/M2 | OXYGEN SATURATION: 98 %

## 2022-12-01 DIAGNOSIS — Z00.00 MEDICARE ANNUAL WELLNESS VISIT, SUBSEQUENT: ICD-10-CM

## 2022-12-01 DIAGNOSIS — E03.9 ACQUIRED HYPOTHYROIDISM: ICD-10-CM

## 2022-12-01 DIAGNOSIS — K52.9 CHRONIC DIARRHEA OF UNKNOWN ORIGIN: ICD-10-CM

## 2022-12-01 DIAGNOSIS — R79.89 HIGH SERUM VITAMIN B12: ICD-10-CM

## 2022-12-01 DIAGNOSIS — J30.1 SEASONAL ALLERGIC RHINITIS DUE TO POLLEN: ICD-10-CM

## 2022-12-01 DIAGNOSIS — B00.9 HERPES SIMPLEX VIRUS (HSV) INFECTION: ICD-10-CM

## 2022-12-01 DIAGNOSIS — M54.41 CHRONIC MIDLINE LOW BACK PAIN WITH BILATERAL SCIATICA: ICD-10-CM

## 2022-12-01 DIAGNOSIS — G89.29 CHRONIC MIDLINE LOW BACK PAIN WITH BILATERAL SCIATICA: ICD-10-CM

## 2022-12-01 DIAGNOSIS — G89.29 CHRONIC BILATERAL THORACIC BACK PAIN: ICD-10-CM

## 2022-12-01 DIAGNOSIS — E78.49 OTHER HYPERLIPIDEMIA: ICD-10-CM

## 2022-12-01 DIAGNOSIS — M54.6 CHRONIC BILATERAL THORACIC BACK PAIN: ICD-10-CM

## 2022-12-01 DIAGNOSIS — R19.7 DIARRHEA, UNSPECIFIED TYPE: ICD-10-CM

## 2022-12-01 DIAGNOSIS — M85.80 OSTEOPENIA, UNSPECIFIED LOCATION: ICD-10-CM

## 2022-12-01 DIAGNOSIS — M54.42 CHRONIC MIDLINE LOW BACK PAIN WITH BILATERAL SCIATICA: ICD-10-CM

## 2022-12-01 DIAGNOSIS — L50.9 HIVES: ICD-10-CM

## 2022-12-01 PROBLEM — E87.1 HYPONATREMIA: Status: RESOLVED | Noted: 2019-06-18 | Resolved: 2022-12-01

## 2022-12-01 PROBLEM — G89.4 CHRONIC PAIN SYNDROME: Status: RESOLVED | Noted: 2018-03-27 | Resolved: 2022-12-01

## 2022-12-01 PROBLEM — R90.82 WHITE MATTER ABNORMALITY ON MRI OF BRAIN: Status: RESOLVED | Noted: 2018-01-09 | Resolved: 2022-12-01

## 2022-12-01 PROBLEM — M54.2 PAIN OF CERVICAL SPINE: Status: RESOLVED | Noted: 2018-06-26 | Resolved: 2022-12-01

## 2022-12-01 PROBLEM — N64.4 BREAST PAIN, LEFT: Status: RESOLVED | Noted: 2019-03-12 | Resolved: 2022-12-01

## 2022-12-01 PROBLEM — R93.89 ABNORMAL FINDING ON ULTRASOUND: Status: RESOLVED | Noted: 2021-01-04 | Resolved: 2022-12-01

## 2022-12-01 PROBLEM — M54.40 CHRONIC LEFT-SIDED LOW BACK PAIN WITH SCIATICA: Status: RESOLVED | Noted: 2017-07-25 | Resolved: 2022-12-01

## 2022-12-01 PROCEDURE — G0439 PPPS, SUBSEQ VISIT: HCPCS | Performed by: FAMILY MEDICINE

## 2022-12-01 RX ORDER — DIPHENOXYLATE HYDROCHLORIDE AND ATROPINE SULFATE 2.5; .025 MG/1; MG/1
1 TABLET ORAL 4 TIMES DAILY PRN
Qty: 30 TABLET | Refills: 1 | Status: SHIPPED | OUTPATIENT
Start: 2022-12-01 | End: 2023-03-01

## 2022-12-01 RX ORDER — AMITRIPTYLINE HYDROCHLORIDE 75 MG/1
75 TABLET ORAL
Qty: 90 TABLET | Refills: 3 | Status: SHIPPED | OUTPATIENT
Start: 2022-12-01 | End: 2023-03-16

## 2022-12-01 RX ORDER — HYDROXYZINE HYDROCHLORIDE 25 MG/1
25 TABLET, FILM COATED ORAL
Qty: 30 TABLET | Refills: 1 | Status: SHIPPED | OUTPATIENT
Start: 2022-12-01

## 2022-12-01 ASSESSMENT — PATIENT HEALTH QUESTIONNAIRE - PHQ9: CLINICAL INTERPRETATION OF PHQ2 SCORE: 0

## 2022-12-01 ASSESSMENT — ENCOUNTER SYMPTOMS: GENERAL WELL-BEING: GOOD

## 2022-12-01 ASSESSMENT — ACTIVITIES OF DAILY LIVING (ADL): BATHING_REQUIRES_ASSISTANCE: 0

## 2022-12-01 ASSESSMENT — FIBROSIS 4 INDEX: FIB4 SCORE: 1.85
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
## 2022-12-02 NOTE — TELEPHONE ENCOUNTER
MEDICATION PRIOR AUTHORIZATION NEEDED:    1. Name of Medication: hydrOXYzine HCl (ATARAX) 25 MG Tab    2. Requested By (Name of Pharmacy): [facility]   P: [phone]  F: [phone]     3. Is insurance on file current? Yes    4. What is the name & phone number of the 3rd party payor?   Medicare/ [facility]  P: [phone]  F: [phone]    Prior auth submitted to plan through CoverMyMeds, waiting for possible approval.   Key: [id number]  Case ID: [id number]

## 2022-12-03 NOTE — TELEPHONE ENCOUNTER
FINAL PRIOR AUTHORIZATION STATUS:    1.  Name of Medication & Dose:  hydrOXYzine HCl (ATARAX) 25 MG Tab    2. Prior Auth Status: Approved through 12/01/2023     3. Action Taken: Pharmacy Notified: yes Patient Notified: yes

## 2022-12-14 ENCOUNTER — TELEPHONE (OUTPATIENT)
Dept: MEDICAL GROUP | Facility: LAB | Age: 77
End: 2022-12-14
Payer: MEDICARE

## 2022-12-14 NOTE — TELEPHONE ENCOUNTER
1. Caller Name: Joie Hart      Call Back Number: [phone]      How would the patient prefer to be contacted with a response: Phone call OK to leave a detailed message    Patient called stating that she's had constipation for about 10 days, otc tried such as miralax 3 days ago. Patient also said she'll also try a fleet enema tonight. Patient states she's normally constipated but not this bad. Advised schedules were full until Friday 12/16. Appointment schedule with KIRSTEN Wild but patient wants to know what she can do next.     Patient called back and spoke to Nayeli trying to get an appointment today.

## 2022-12-15 ENCOUNTER — HOSPITAL ENCOUNTER (OUTPATIENT)
Dept: LAB | Facility: MEDICAL CENTER | Age: 77
End: 2022-12-15
Attending: FAMILY MEDICINE
Payer: MEDICARE

## 2022-12-15 DIAGNOSIS — E78.49 OTHER HYPERLIPIDEMIA: ICD-10-CM

## 2022-12-15 DIAGNOSIS — M85.80 OSTEOPENIA, UNSPECIFIED LOCATION: ICD-10-CM

## 2022-12-15 DIAGNOSIS — R79.89 HIGH SERUM VITAMIN B12: ICD-10-CM

## 2022-12-15 LAB
ALBUMIN SERPL BCP-MCNC: 4.6 G/DL (ref 3.2–4.9)
ALBUMIN/GLOB SERPL: 1.7 G/DL
ALP SERPL-CCNC: 90 U/L (ref 30–99)
ALT SERPL-CCNC: 13 U/L (ref 2–50)
ANION GAP SERPL CALC-SCNC: 9 MMOL/L (ref 7–16)
AST SERPL-CCNC: 16 U/L (ref 12–45)
BASOPHILS # BLD AUTO: 0.7 % (ref 0–1.8)
BASOPHILS # BLD: 0.04 K/UL (ref 0–0.12)
BILIRUB SERPL-MCNC: 0.5 MG/DL (ref 0.1–1.5)
BUN SERPL-MCNC: 7 MG/DL (ref 8–22)
CALCIUM ALBUM COR SERPL-MCNC: 9.3 MG/DL (ref 8.5–10.5)
CALCIUM SERPL-MCNC: 9.8 MG/DL (ref 8.4–10.2)
CHLORIDE SERPL-SCNC: 104 MMOL/L (ref 96–112)
CHOLEST SERPL-MCNC: 235 MG/DL (ref 100–199)
CO2 SERPL-SCNC: 27 MMOL/L (ref 20–33)
CREAT SERPL-MCNC: 0.54 MG/DL (ref 0.5–1.4)
EOSINOPHIL # BLD AUTO: 0.11 K/UL (ref 0–0.51)
EOSINOPHIL NFR BLD: 1.9 % (ref 0–6.9)
ERYTHROCYTE [DISTWIDTH] IN BLOOD BY AUTOMATED COUNT: 41.1 FL (ref 35.9–50)
FASTING STATUS PATIENT QL REPORTED: NORMAL
GFR SERPLBLD CREATININE-BSD FMLA CKD-EPI: 95 ML/MIN/1.73 M 2
GLOBULIN SER CALC-MCNC: 2.7 G/DL (ref 1.9–3.5)
GLUCOSE SERPL-MCNC: 95 MG/DL (ref 65–99)
HCT VFR BLD AUTO: 42.5 % (ref 37–47)
HDLC SERPL-MCNC: 98 MG/DL
HGB BLD-MCNC: 14.4 G/DL (ref 12–16)
IMM GRANULOCYTES # BLD AUTO: 0.01 K/UL (ref 0–0.11)
IMM GRANULOCYTES NFR BLD AUTO: 0.2 % (ref 0–0.9)
LDLC SERPL CALC-MCNC: 123 MG/DL
LYMPHOCYTES # BLD AUTO: 2.65 K/UL (ref 1–4.8)
LYMPHOCYTES NFR BLD: 46.7 % (ref 22–41)
MCH RBC QN AUTO: 32.5 PG (ref 27–33)
MCHC RBC AUTO-ENTMCNC: 33.9 G/DL (ref 33.6–35)
MCV RBC AUTO: 95.9 FL (ref 81.4–97.8)
MONOCYTES # BLD AUTO: 0.44 K/UL (ref 0–0.85)
MONOCYTES NFR BLD AUTO: 7.7 % (ref 0–13.4)
NEUTROPHILS # BLD AUTO: 2.43 K/UL (ref 2–7.15)
NEUTROPHILS NFR BLD: 42.8 % (ref 44–72)
NRBC # BLD AUTO: 0 K/UL
NRBC BLD-RTO: 0 /100 WBC
PLATELET # BLD AUTO: 214 K/UL (ref 164–446)
PMV BLD AUTO: 9.7 FL (ref 9–12.9)
POTASSIUM SERPL-SCNC: 4.7 MMOL/L (ref 3.6–5.5)
PROT SERPL-MCNC: 7.3 G/DL (ref 6–8.2)
RBC # BLD AUTO: 4.43 M/UL (ref 4.2–5.4)
SODIUM SERPL-SCNC: 140 MMOL/L (ref 135–145)
TRIGL SERPL-MCNC: 69 MG/DL (ref 0–149)
VIT B12 SERPL-MCNC: 632 PG/ML (ref 211–911)
WBC # BLD AUTO: 5.7 K/UL (ref 4.8–10.8)

## 2022-12-15 PROCEDURE — 80053 COMPREHEN METABOLIC PANEL: CPT

## 2022-12-15 PROCEDURE — 82607 VITAMIN B-12: CPT

## 2022-12-15 PROCEDURE — 36415 COLL VENOUS BLD VENIPUNCTURE: CPT

## 2022-12-15 PROCEDURE — 80061 LIPID PANEL: CPT

## 2022-12-15 PROCEDURE — 85025 COMPLETE CBC W/AUTO DIFF WBC: CPT

## 2022-12-19 RX ORDER — MELOXICAM 7.5 MG/1
TABLET ORAL
Qty: 30 TABLET | Refills: 3 | Status: SHIPPED | OUTPATIENT
Start: 2022-12-19 | End: 2022-12-21

## 2022-12-19 NOTE — TELEPHONE ENCOUNTER
Received request via: Pharmacy    Was the patient seen in the last year in this department? Yes  12/1/22  Does the patient have an active prescription (recently filled or refills available) for medication(s) requested? No    Does the patient have custodial Plus and need 100 day supply (blood pressure, diabetes and cholesterol meds only)? Patient does not have SCP

## 2022-12-21 ENCOUNTER — OFFICE VISIT (OUTPATIENT)
Dept: MEDICAL GROUP | Facility: LAB | Age: 77
End: 2022-12-21
Payer: MEDICARE

## 2022-12-21 VITALS
SYSTOLIC BLOOD PRESSURE: 116 MMHG | OXYGEN SATURATION: 97 % | HEIGHT: 64 IN | HEART RATE: 80 BPM | TEMPERATURE: 97.6 F | RESPIRATION RATE: 12 BRPM | BODY MASS INDEX: 21.37 KG/M2 | DIASTOLIC BLOOD PRESSURE: 72 MMHG | WEIGHT: 125.2 LBS

## 2022-12-21 DIAGNOSIS — K59.04 CHRONIC IDIOPATHIC CONSTIPATION: ICD-10-CM

## 2022-12-21 DIAGNOSIS — H92.03 OTALGIA OF BOTH EARS: ICD-10-CM

## 2022-12-21 DIAGNOSIS — E78.49 OTHER HYPERLIPIDEMIA: ICD-10-CM

## 2022-12-21 DIAGNOSIS — R19.5 ACHOLIC STOOL: ICD-10-CM

## 2022-12-21 PROCEDURE — 99214 OFFICE O/P EST MOD 30 MIN: CPT | Performed by: FAMILY MEDICINE

## 2022-12-21 ASSESSMENT — FIBROSIS 4 INDEX: FIB4 SCORE: 1.58

## 2022-12-21 NOTE — PROGRESS NOTES
"Subjective:     CC: Multiple concerns    HPI:   Joie presents today with multiple concerns.    Intermittent pain to both ears and she would like these checked.    Continues to have alternating diarrhea and constipation.  She has added a fiber supplement with some improvement but still only having a bowel movement about once weekly.  No bloody stool or melena, no nausea or vomiting.  She does have serious concerns about having light-colored or \"grey\" stools.  Also having varying, intermittent abdominal pains.    Cholesterol elevated, for now she declines statin.    Medications, past medical history, allergies, and social history have been reviewed and updated.      Objective:       Exam:  /72 (BP Location: Left arm, Patient Position: Sitting, BP Cuff Size: Adult)   Pulse 80   Temp 36.4 °C (97.6 °F)   Resp 12   Ht 1.626 m (5' 4\")   Wt 56.8 kg (125 lb 3.2 oz)   SpO2 97%   BMI 21.49 kg/m²  Body mass index is 21.49 kg/m².    Constitutional: Alert. Well appearing. No distress.  Skin: Warm, dry, good turgor, no visible rashes.  Eye: Equal, round and reactive to light, conjunctiva clear, lids normal.  Ears: Bilateral canals and TMs clear.  Respiratory: Normal effort. Lungs are clear to auscultation bilaterally.  Cardiovascular: Regular rate and rhythm. Normal S1/S2. No murmurs, rubs or gallops.   Abdomen: Mild diffuse tenderness, nondistended, no rebound or guarding.  Schneider's sign.  Neuro: Moves all four extremities. No facial droop.  Psych: Answers questions appropriately. Normal affect and mood.      Assessment & Plan:     77 y.o. female with the following -     1. Acholic stool  She reports persistent very light-colored or gray stool.  Labs are without signs of biliary stasis.  I think constipation likely the source of many of her symptoms as below but will evaluate further with abdominal ultrasound.  - US-ABDOMEN COMPLETE SURVEY; Future    2. Chronic idiopathic constipation  Limited improvement with " fiber supplement.  Recommended short course of laxative, titrating to at least 2 soft stools daily.  Can then try to maintain regularity with fiber supplement.    3. Other hyperlipidemia  Discussed statin but she declines at this time.    4. Otalgia of both ears  Canals and TMs clear.  Continue to monitor.    Please note that this note was created using voice recognition software.

## 2023-01-12 ENCOUNTER — OFFICE VISIT (OUTPATIENT)
Dept: MEDICAL GROUP | Facility: LAB | Age: 78
End: 2023-01-12
Payer: MEDICARE

## 2023-01-12 VITALS
RESPIRATION RATE: 12 BRPM | BODY MASS INDEX: 20.69 KG/M2 | DIASTOLIC BLOOD PRESSURE: 74 MMHG | OXYGEN SATURATION: 98 % | HEIGHT: 64 IN | WEIGHT: 121.2 LBS | SYSTOLIC BLOOD PRESSURE: 112 MMHG | HEART RATE: 88 BPM | TEMPERATURE: 97.7 F

## 2023-01-12 DIAGNOSIS — I83.93 ASYMPTOMATIC VARICOSE VEINS OF BOTH LOWER EXTREMITIES: ICD-10-CM

## 2023-01-12 DIAGNOSIS — M26.609 TMJ (TEMPOROMANDIBULAR JOINT DISORDER): ICD-10-CM

## 2023-01-12 DIAGNOSIS — Z91.89 OTHER SPECIFIED PERSONAL RISK FACTORS, NOT ELSEWHERE CLASSIFIED: ICD-10-CM

## 2023-01-12 DIAGNOSIS — F41.9 ANXIETY: ICD-10-CM

## 2023-01-12 PROCEDURE — 99214 OFFICE O/P EST MOD 30 MIN: CPT | Performed by: FAMILY MEDICINE

## 2023-01-12 ASSESSMENT — FIBROSIS 4 INDEX: FIB4 SCORE: 1.58

## 2023-01-12 ASSESSMENT — PATIENT HEALTH QUESTIONNAIRE - PHQ9: CLINICAL INTERPRETATION OF PHQ2 SCORE: 0

## 2023-01-12 NOTE — PROGRESS NOTES
"Subjective:     CC: Multiple concerns.    HPI:   Joie presents today with multiple concerns.    Two days ago she had an episode of left-sided chest pain after waking up, this lasted about 30 minutes, may have had some back pain as well.  No obvious trigger, no associated symptoms including shortness of breath or diaphoresis.  Has had very high stress and anxiety recently.  Has had chronic chest pressure for years with reassuring work-up in 2019 during overnight hospital stay with negative stress test.  She has no exertional chest pain or shortness of breath.  She has had no return of pain since that episode.  No current pain.    She also has some chronic right-sided ear/jaw pain.  May have noticed a swollen lymph node to her right neck a few days ago.  This also has since resolved.  She was diagnosed with TMJ by dentist in the past.    Varicose veins to both lower legs.  No overlying skin irritation or ulcer.    Labs with elevated LDL. The 10-year ASCVD risk score (Duane MEADE, et al., 2019) is: 14.6%    Medications, past medical history, allergies, and social history have been reviewed and updated.    Objective:       Exam:  /74 (BP Location: Right arm, Patient Position: Sitting, BP Cuff Size: Adult)   Pulse 88   Temp 36.5 °C (97.7 °F)   Resp 12   Ht 1.626 m (5' 4\")   Wt 55 kg (121 lb 3.2 oz)   SpO2 98%   BMI 20.80 kg/m²  Body mass index is 20.8 kg/m².    Constitutional: Alert.   Skin: Warm, dry, good turgor, no visible rashes.  Eye: Equal, round and reactive to light, conjunctiva clear, lids normal.  ENMT: Moist mucous membranes. Normal dentition.  Ear canals and tympanic membranes are clear.  Respiratory: Normal effort. Lungs are clear to auscultation bilaterally.  Cardiovascular: Regular rate and rhythm. Normal S1/S2. No murmurs, rubs or gallops.   Ext: Mild varicose veins of both lower extremities.  No edema, no overlying skin changes.  Radial pulses are 2+ and symmetric.  Neuro: Moves all four " extremities. No facial droop.  Psych: Answers questions appropriately. Anxious.    Assessment & Plan:     77 y.o. female with the following -     1. Other specified personal risk factors, not elsewhere classified  Chronic chest pressure with previously reassuring work-up including stress test in 2019.  Recent episode of brief nonexertional chest pain with high stress/anxiety.  LDL is elevated, no current statin.  CT cardiac scoring ordered further quantify her risk.  If plaque is moderate or higher would recommend statin and may consider cardiology referral for further discussion of her chronic chest pain/pressure.  I do think stress/anxiety likely playing a role as below.  We extensively discussed ER precautions in the meantime.  - CT-CARDIAC SCORING; Future    2. Anxiety  Stress/anxiety likely contributing to multiple symptoms.  Possibly chest pressure as above as well as TMJ.  Continue amitriptyline at bedtime for now.  Could consider medication change in the future.    3. Asymptomatic varicose veins of both lower extremities  Start compression stockings, continue elevation.    4. TMJ (temporomandibular joint disorder)  Chronic right-sided jaw pain, unremarkable exam.  Previous diagnosis of TMJ with dentist.  Did recommend she use her oral appliance, work on stress reduction.    Please note that this note was created using voice recognition software.

## 2023-02-04 ENCOUNTER — HOSPITAL ENCOUNTER (OUTPATIENT)
Dept: RADIOLOGY | Facility: MEDICAL CENTER | Age: 78
End: 2023-02-04
Attending: FAMILY MEDICINE
Payer: MEDICARE

## 2023-02-04 DIAGNOSIS — Z91.89 OTHER SPECIFIED PERSONAL RISK FACTORS, NOT ELSEWHERE CLASSIFIED: ICD-10-CM

## 2023-02-04 PROCEDURE — 4410556 CT-CARDIAC SCORING (SELF PAY ONLY)

## 2023-02-24 ENCOUNTER — APPOINTMENT (OUTPATIENT)
Dept: RADIOLOGY | Facility: MEDICAL CENTER | Age: 78
End: 2023-02-24
Attending: FAMILY MEDICINE
Payer: MEDICARE

## 2023-03-14 ENCOUNTER — OFFICE VISIT (OUTPATIENT)
Dept: MEDICAL GROUP | Facility: LAB | Age: 78
End: 2023-03-14
Payer: MEDICARE

## 2023-03-14 VITALS
SYSTOLIC BLOOD PRESSURE: 124 MMHG | HEART RATE: 86 BPM | RESPIRATION RATE: 12 BRPM | WEIGHT: 125 LBS | HEIGHT: 64 IN | TEMPERATURE: 97 F | BODY MASS INDEX: 21.34 KG/M2 | OXYGEN SATURATION: 94 % | DIASTOLIC BLOOD PRESSURE: 72 MMHG

## 2023-03-14 DIAGNOSIS — R93.1 AGATSTON CAC SCORE, <100: ICD-10-CM

## 2023-03-14 DIAGNOSIS — G89.29 CHRONIC RUQ PAIN: ICD-10-CM

## 2023-03-14 DIAGNOSIS — R10.11 CHRONIC RUQ PAIN: ICD-10-CM

## 2023-03-14 DIAGNOSIS — M85.88 OSTEOPENIA OF LUMBAR SPINE: ICD-10-CM

## 2023-03-14 PROCEDURE — 99214 OFFICE O/P EST MOD 30 MIN: CPT | Performed by: FAMILY MEDICINE

## 2023-03-14 ASSESSMENT — FIBROSIS 4 INDEX: FIB4 SCORE: 1.58

## 2023-03-14 NOTE — PROGRESS NOTES
"Subjective:     CC: CAC score, abdominal pain    HPI:   Joie presents today to follow-up on CAC score and discuss chronic/recurrent right upper quadrant abdominal pain.    CAC score of 39.1 in the LAD.  For now she would prefer to hold off on statin at least until further work-up can be done for her abdominal issues.    Recurrent right upper quadrant abdominal pain has been going on for 3+ months.  Has seen some improvement with treating constipation but not completely resolved.  Acholic stool has improved.  Pain seems to happen at random, not only after eating, she does have some frequent gas and 1 episode of possible coffee-ground appearing material in stool.  No melena, no blood in stool.  Occasional nausea but no vomiting, occasional coughing after swallowing.  Reports soft stools daily.  Had colonoscopy in 2018 with internal hemorrhoids and diverticula.      Medications, past medical history, allergies, and social history have been reviewed and updated.      Objective:       Exam:  /72 (BP Location: Left arm, Patient Position: Sitting, BP Cuff Size: Adult)   Pulse 86   Temp 36.1 °C (97 °F)   Resp 12   Ht 1.626 m (5' 4\")   Wt 56.7 kg (125 lb)   SpO2 94%   BMI 21.46 kg/m²  Body mass index is 21.46 kg/m².    Constitutional: Alert. Well appearing. No distress.  Skin: Warm, dry, good turgor, no visible rashes.  Eye: Equal, round and reactive to light, conjunctiva clear, lids normal.  ENMT: Moist mucous membranes. Normal dentition.  Abdomen: Soft, mild right upper quadrant tenderness without rebound or guarding.  Schneider sign.  Nondistended.  Neuro: Moves all four extremities. No facial droop.  Psych: Answers questions appropriately. Normal affect and mood.    Assessment & Plan:     77 y.o. female with the following -     1. Chronic RUQ pain  Going on for 3+ months, minimal improvement with treatment of constipation.  Not related to meals.  Acholic stool has improved.  MR abdomen last year without " acute/concerning findings.  Abdominal ultrasound is pending.  If this is unremarkable we will consider gastroenterology referral to discuss possible EGD or further work-up.  Had a colonoscopy in 2018 with internal hemorrhoids/diverticula but no concerning findings.  Differential includes GERD, gastritis, gallbladder pathology.  We did also discuss increasing her amitriptyline back up to 100 mg daily if ultrasound is unremarkable as she may have seen improved pain at a higher dose.    2. Agatston CAC score, <100  CAC score of 39.1 in the LAD.  Recommended starting Lipitor but she would like to hold off on statin for now.    3. Osteopenia of lumbar spine  Due for repeat DEXA.  - DS-BONE DENSITY STUDY (DEXA); Future      Please note that this note was created using voice recognition software.

## 2023-03-16 ENCOUNTER — APPOINTMENT (OUTPATIENT)
Dept: RADIOLOGY | Facility: MEDICAL CENTER | Age: 78
End: 2023-03-16
Attending: FAMILY MEDICINE
Payer: MEDICARE

## 2023-03-16 DIAGNOSIS — R19.5 ACHOLIC STOOL: ICD-10-CM

## 2023-03-16 PROCEDURE — 76700 US EXAM ABDOM COMPLETE: CPT

## 2023-03-16 RX ORDER — AMITRIPTYLINE HYDROCHLORIDE 100 MG/1
100 TABLET ORAL NIGHTLY
Qty: 90 TABLET | Refills: 3 | Status: SHIPPED | OUTPATIENT
Start: 2023-03-16 | End: 2023-12-07

## 2023-05-01 ENCOUNTER — HOSPITAL ENCOUNTER (OUTPATIENT)
Dept: LAB | Facility: MEDICAL CENTER | Age: 78
End: 2023-05-01
Attending: CHIROPRACTOR
Payer: MEDICARE

## 2023-05-01 LAB
T4 FREE SERPL-MCNC: 1.38 NG/DL (ref 0.93–1.7)
TSH SERPL DL<=0.005 MIU/L-ACNC: 1.36 UIU/ML (ref 0.38–5.33)

## 2023-05-01 PROCEDURE — 36415 COLL VENOUS BLD VENIPUNCTURE: CPT

## 2023-05-01 PROCEDURE — 84443 ASSAY THYROID STIM HORMONE: CPT

## 2023-05-01 PROCEDURE — 84439 ASSAY OF FREE THYROXINE: CPT

## 2023-07-17 ENCOUNTER — OFFICE VISIT (OUTPATIENT)
Dept: MEDICAL GROUP | Facility: LAB | Age: 78
End: 2023-07-17
Payer: MEDICARE

## 2023-07-17 VITALS
BODY MASS INDEX: 21.51 KG/M2 | DIASTOLIC BLOOD PRESSURE: 68 MMHG | RESPIRATION RATE: 16 BRPM | HEIGHT: 64 IN | HEART RATE: 83 BPM | SYSTOLIC BLOOD PRESSURE: 108 MMHG | OXYGEN SATURATION: 99 % | WEIGHT: 126 LBS | TEMPERATURE: 98.6 F

## 2023-07-17 DIAGNOSIS — S09.90XA INJURY OF HEAD, INITIAL ENCOUNTER: ICD-10-CM

## 2023-07-17 PROCEDURE — 99213 OFFICE O/P EST LOW 20 MIN: CPT | Performed by: FAMILY MEDICINE

## 2023-07-17 PROCEDURE — 3078F DIAST BP <80 MM HG: CPT | Performed by: FAMILY MEDICINE

## 2023-07-17 PROCEDURE — 3074F SYST BP LT 130 MM HG: CPT | Performed by: FAMILY MEDICINE

## 2023-07-17 RX ORDER — AMITRIPTYLINE HYDROCHLORIDE 100 MG/1
TABLET ORAL
COMMUNITY
End: 2023-12-21 | Stop reason: SDUPTHER

## 2023-07-17 ASSESSMENT — FIBROSIS 4 INDEX: FIB4 SCORE: 1.6

## 2023-07-17 NOTE — PROGRESS NOTES
"Subjective:   Joie Hrat is a 78 y.o. female here today for   Chief Complaint   Patient presents with    Bump     X on top of head, no bump, no lac, no bleeding, headache started about 4 pm in the PM yesterday, Icing head, and resting.      #Head Injury   -Patient inadvertently contacted the right front part of her head against a cabinet on Friday night.  She states that there was no bleeding, no \"bump\", no laceration.  Denies any loss of consciousness, presyncope.  She states that since then she has had ongoing headaches.  -Pain in head has been intermittent but does \"move around\" has had intermittent, constant pain within temporal area bilateral, moving to top of head.   -Denies any dizziness, nausea, changes in vision, photophobia, phonophobia, nausea, vomiting, numbness/tingling/weakness in upper or lower extremities, difficulty swallowing, facial drooping.  -Hx of fall/head injuries in the past.   -Treating with tylenol which has been helping.  She states that she decided come in today after headaches did worsen over last night where she had significant symptoms on the top of her head for several hours disrupting sleep.      Allergies   Allergen Reactions    Codeine Rash     Rash all over             Current medicines (including changes today)  Current Outpatient Medications   Medication Sig Dispense Refill    HYDROXYZINE HCL PO prn      amitriptyline (ELAVIL) 100 MG Tab 1 Orally qd      amitriptyline (ELAVIL) 100 MG Tab Take 1 Tablet by mouth every evening. 90 Tablet 3    hydrOXYzine HCl (ATARAX) 25 MG Tab Take 1 Tablet by mouth every 24 hours as needed for Itching. 30 Tablet 1    Melatonin 10 MG Tab Take  by mouth.      levothyroxine (SYNTHROID) 75 MCG Tab Take 75 mcg by mouth every morning on an empty stomach.      Cholecalciferol (VITAMIN D) 125 MCG (5000 UT) Cap Take  by mouth.      Ascorbic Acid (VITAMIN C) 1000 MG Tab Take  by mouth.      NON SPECIFIED Homeopathic Immune system      " "Butalbital-Acetaminophen  MG Tab Take  by mouth.      acyclovir (ZOVIRAX) 800 MG Tab Take 1 Tablet by mouth 2 times a day. 90 Tablet 1    diphenhydrAMINE-APAP, sleep, (TYLENOL PM EXTRA STRENGTH)  MG Tab Take  by mouth.      therapeutic multivitamin-minerals (THERAGRAN-M) Tab Take 1 Tab by mouth every day.       No current facility-administered medications for this visit.     She  has a past medical history of Allergy, Anemia, Anxiety, Cataract, Chronic back pain, Depression (2009), Diverticulitis, Glaucoma, Head injury, closed (3/6/16), HSV infection, IBD (inflammatory bowel disease), Migraine, Pelvic fracture ([facility]) (1976), Spondylisthesis, Thyroid disease, and Vaginal vault prolapse (2006).    ROS   -See HPI     Objective:     Physical Exam:  /68   Pulse 83   Temp 37 °C (98.6 °F) (Temporal)   Resp 16   Ht 1.626 m (5' 4.02\")   Wt 57.2 kg (126 lb)   SpO2 99%  Body mass index is 21.62 kg/m².   Constitutional: Alert, no distress.  Skin: Warm, dry, good turgor, no rashes in visible areas.  Eye: Equal, round and reactive, conjunctiva clear, lids normal.  Cursory funduscopic exam unremarkable  Neck: Trachea midline, no masses, no thyromegaly. No cervical or supraclavicular lymphadenopathy.  Respiratory: Unlabored respiratory effort  Psych: Alert and oriented x3, normal affect and mood.  Neuro: Cranial nerves I through XII are intact, no focal motor/sensory deficits on exam.    Assessment and Plan:     1. Injury of head, initial encounter  -Physical examination is benign.  Headaches could be ongoing from injury.  No other signs or symptoms of concussion at this time.  No red flag symptoms.  Discussed conservative treatment with rotating Tylenol, ibuprofen over the next several days.  Discussed strict return precautions, patient will follow-up as needed.      Followup: No follow-ups on file.         PLEASE NOTE: This dictation was created using voice recognition software. I have made every reasonable " attempt to correct obvious errors, but I expect that there are errors of grammar and possibly content that I did not discover before finalizing the note.

## 2023-08-31 ENCOUNTER — HOSPITAL ENCOUNTER (OUTPATIENT)
Dept: LAB | Facility: MEDICAL CENTER | Age: 78
End: 2023-08-31
Attending: PHYSICIAN ASSISTANT
Payer: MEDICARE

## 2023-08-31 LAB
T4 FREE SERPL-MCNC: 1.38 NG/DL (ref 0.93–1.7)
TSH SERPL DL<=0.005 MIU/L-ACNC: 2.05 UIU/ML (ref 0.38–5.33)

## 2023-08-31 PROCEDURE — 84443 ASSAY THYROID STIM HORMONE: CPT

## 2023-08-31 PROCEDURE — 84439 ASSAY OF FREE THYROXINE: CPT

## 2023-08-31 PROCEDURE — 36415 COLL VENOUS BLD VENIPUNCTURE: CPT

## 2023-09-28 ENCOUNTER — OFFICE VISIT (OUTPATIENT)
Dept: MEDICAL GROUP | Facility: LAB | Age: 78
End: 2023-09-28
Payer: MEDICARE

## 2023-09-28 VITALS
RESPIRATION RATE: 16 BRPM | DIASTOLIC BLOOD PRESSURE: 70 MMHG | WEIGHT: 126 LBS | TEMPERATURE: 97.3 F | SYSTOLIC BLOOD PRESSURE: 104 MMHG | BODY MASS INDEX: 23.19 KG/M2 | OXYGEN SATURATION: 97 % | HEIGHT: 62 IN | HEART RATE: 76 BPM

## 2023-09-28 DIAGNOSIS — G89.29 CHRONIC INTRACTABLE HEADACHE, UNSPECIFIED HEADACHE TYPE: ICD-10-CM

## 2023-09-28 DIAGNOSIS — R51.9 CHRONIC INTRACTABLE HEADACHE, UNSPECIFIED HEADACHE TYPE: ICD-10-CM

## 2023-09-28 PROCEDURE — 3078F DIAST BP <80 MM HG: CPT | Performed by: FAMILY MEDICINE

## 2023-09-28 PROCEDURE — 99214 OFFICE O/P EST MOD 30 MIN: CPT | Performed by: FAMILY MEDICINE

## 2023-09-28 PROCEDURE — 3074F SYST BP LT 130 MM HG: CPT | Performed by: FAMILY MEDICINE

## 2023-09-28 ASSESSMENT — FIBROSIS 4 INDEX: FIB4 SCORE: 1.6

## 2023-09-28 NOTE — PROGRESS NOTES
"Subjective:   Joie Hart is a 78 y.o. female here today for   Chief Complaint   Patient presents with    Patient Question     X ongoing headaches after bumping head Still getting stings on tp of scalp.   Tylenol 500 mg is helping        #Headaches.   -patient here to follow up after head trauma sustained in July. Since the  she has had some improvent ut continues to get headaches that are occurring every other day but not at debilitating and intense.  -Patient also descirbed symnptomns of \"pinpricks\" or \"lightning bolts\" that are occurring on the top of her head that are independent of the headache and are occurring most most day.   -No recent changes to vision or hearing. No new difficulty with speaking. No new dizziness. Did describe some loss of balance while gardening but feels like this is independent of current symptoms.  Denies any numbness, ting, weakness of upper or lower extremities.  -Patient does have history of chronic intractable headaches and was previously being followed by neurology.  Patient is interested in returning to neurology.  -She continues to treat with OTC analgesics and does find some relief enough to complete her normal daily activities.    Allergies   Allergen Reactions    Codeine Rash     Rash all over             Current medicines (including changes today)  Current Outpatient Medications   Medication Sig Dispense Refill    HYDROXYZINE HCL PO prn      amitriptyline (ELAVIL) 100 MG Tab 1 Orally qd      amitriptyline (ELAVIL) 100 MG Tab Take 1 Tablet by mouth every evening. 90 Tablet 3    hydrOXYzine HCl (ATARAX) 25 MG Tab Take 1 Tablet by mouth every 24 hours as needed for Itching. 30 Tablet 1    Melatonin 10 MG Tab Take  by mouth.      levothyroxine (SYNTHROID) 75 MCG Tab Take 75 mcg by mouth every morning on an empty stomach.      Cholecalciferol (VITAMIN D) 125 MCG (5000 UT) Cap Take  by mouth.      Ascorbic Acid (VITAMIN C) 1000 MG Tab Take  by mouth.      NON SPECIFIED " "Homeopathic Immune system      Butalbital-Acetaminophen  MG Tab Take  by mouth.      acyclovir (ZOVIRAX) 800 MG Tab Take 1 Tablet by mouth 2 times a day. 90 Tablet 1    diphenhydrAMINE-APAP, sleep, (TYLENOL PM EXTRA STRENGTH)  MG Tab Take  by mouth.      therapeutic multivitamin-minerals (THERAGRAN-M) Tab Take 1 Tab by mouth every day.       No current facility-administered medications for this visit.     She  has a past medical history of Allergy, Anemia, Anxiety, Cataract, Chronic back pain, Depression (2009), Diverticulitis, Glaucoma, Head injury, closed (3/6/16), HSV infection, IBD (inflammatory bowel disease), Migraine, Pelvic fracture (HCC) (1976), Spondylisthesis, Thyroid disease, and Vaginal vault prolapse (2006).    ROS   -See HPI     Objective:     Physical Exam:  /70   Pulse 76   Temp 36.3 °C (97.3 °F) (Temporal)   Resp 16   Ht 1.562 m (5' 1.5\")   Wt 57.2 kg (126 lb)   SpO2 97%  Body mass index is 23.42 kg/m².   Constitutional: Alert, no distress, well-groomed.  Skin: No rashes in visible areas.  Eye: Round. Conjunctiva clear, lids normal. No icterus.   ENMT: Lips pink without lesions, good dentition, moist mucous membranes. Phonation normal.  Neck: No masses, no thyromegaly. Moves freely without pain.  Respiratory: Unlabored respiratory effort, no cough or audible wheeze  Psych: Alert and oriented x3, normal affect and mood.     Assessment and Plan:     1. Chronic intractable headache, unspecified headache type  -Chronic condition, unstable.  Patient is having return of chronic headaches that occurred after a fall a few months ago.  While no significant red flag symptoms, given that symptoms are going on for this long, I do believe that further evaluation is needed we will get CT of head.  In the meantime we will refer back to neurologist for further evaluation and treatment.  Return precautions were given, patient understood and agree with current treatment plan.  - Referral to " Neurology  - CT-HEAD W/O; Future      Followup: No follow-ups on file.         PLEASE NOTE: This dictation was created using voice recognition software. I have made every reasonable attempt to correct obvious errors, but I expect that there are errors of grammar and possibly content that I did not discover before finalizing the note.

## 2023-10-16 ENCOUNTER — TELEPHONE (OUTPATIENT)
Dept: NEUROLOGY | Facility: MEDICAL CENTER | Age: 78
End: 2023-10-16

## 2023-10-16 NOTE — TELEPHONE ENCOUNTER
Caller: Joie    Topic/issue: Pt called and wanted to schedule an appt w/ Dr. Bloch. Pt was last seen with her back in 2018. Advised I would send over a message for office to call her back.     Callback Number: [phone]

## 2023-10-19 NOTE — TELEPHONE ENCOUNTER
Called patient to get them scheduled and got the voicemail. Left a VM to call back to schedule appointment

## 2023-10-24 ENCOUNTER — HOSPITAL ENCOUNTER (OUTPATIENT)
Dept: RADIOLOGY | Facility: MEDICAL CENTER | Age: 78
End: 2023-10-24
Attending: FAMILY MEDICINE
Payer: MEDICARE

## 2023-10-24 DIAGNOSIS — G89.29 CHRONIC INTRACTABLE HEADACHE, UNSPECIFIED HEADACHE TYPE: ICD-10-CM

## 2023-10-24 DIAGNOSIS — R51.9 CHRONIC INTRACTABLE HEADACHE, UNSPECIFIED HEADACHE TYPE: ICD-10-CM

## 2023-10-24 PROCEDURE — 70450 CT HEAD/BRAIN W/O DYE: CPT

## 2023-11-29 ENCOUNTER — PATIENT MESSAGE (OUTPATIENT)
Dept: HEALTH INFORMATION MANAGEMENT | Facility: OTHER | Age: 78
End: 2023-11-29

## 2023-11-30 SDOH — ECONOMIC STABILITY: TRANSPORTATION INSECURITY
IN THE PAST 12 MONTHS, HAS LACK OF TRANSPORTATION KEPT YOU FROM MEETINGS, WORK, OR FROM GETTING THINGS NEEDED FOR DAILY LIVING?: PATIENT DECLINED

## 2023-11-30 SDOH — ECONOMIC STABILITY: TRANSPORTATION INSECURITY
IN THE PAST 12 MONTHS, HAS THE LACK OF TRANSPORTATION KEPT YOU FROM MEDICAL APPOINTMENTS OR FROM GETTING MEDICATIONS?: PATIENT DECLINED

## 2023-11-30 SDOH — ECONOMIC STABILITY: INCOME INSECURITY: IN THE LAST 12 MONTHS, WAS THERE A TIME WHEN YOU WERE NOT ABLE TO PAY THE MORTGAGE OR RENT ON TIME?: PATIENT REFUSED

## 2023-11-30 SDOH — ECONOMIC STABILITY: INCOME INSECURITY: HOW HARD IS IT FOR YOU TO PAY FOR THE VERY BASICS LIKE FOOD, HOUSING, MEDICAL CARE, AND HEATING?: PATIENT DECLINED

## 2023-11-30 SDOH — ECONOMIC STABILITY: FOOD INSECURITY: WITHIN THE PAST 12 MONTHS, YOU WORRIED THAT YOUR FOOD WOULD RUN OUT BEFORE YOU GOT MONEY TO BUY MORE.: PATIENT DECLINED

## 2023-11-30 SDOH — ECONOMIC STABILITY: HOUSING INSECURITY

## 2023-11-30 SDOH — ECONOMIC STABILITY: HOUSING INSECURITY
IN THE LAST 12 MONTHS, WAS THERE A TIME WHEN YOU DID NOT HAVE A STEADY PLACE TO SLEEP OR SLEPT IN A SHELTER (INCLUDING NOW)?: PATIENT REFUSED

## 2023-11-30 SDOH — HEALTH STABILITY: PHYSICAL HEALTH: ON AVERAGE, HOW MANY MINUTES DO YOU ENGAGE IN EXERCISE AT THIS LEVEL?: 40 MIN

## 2023-11-30 SDOH — HEALTH STABILITY: MENTAL HEALTH
STRESS IS WHEN SOMEONE FEELS TENSE, NERVOUS, ANXIOUS, OR CAN'T SLEEP AT NIGHT BECAUSE THEIR MIND IS TROUBLED. HOW STRESSED ARE YOU?: PATIENT DECLINED

## 2023-11-30 SDOH — ECONOMIC STABILITY: FOOD INSECURITY: WITHIN THE PAST 12 MONTHS, THE FOOD YOU BOUGHT JUST DIDN'T LAST AND YOU DIDN'T HAVE MONEY TO GET MORE.: PATIENT DECLINED

## 2023-11-30 SDOH — ECONOMIC STABILITY: TRANSPORTATION INSECURITY
IN THE PAST 12 MONTHS, HAS LACK OF RELIABLE TRANSPORTATION KEPT YOU FROM MEDICAL APPOINTMENTS, MEETINGS, WORK OR FROM GETTING THINGS NEEDED FOR DAILY LIVING?: PATIENT DECLINED

## 2023-11-30 SDOH — HEALTH STABILITY: PHYSICAL HEALTH: ON AVERAGE, HOW MANY DAYS PER WEEK DO YOU ENGAGE IN MODERATE TO STRENUOUS EXERCISE (LIKE A BRISK WALK)?: 6 DAYS

## 2023-11-30 ASSESSMENT — SOCIAL DETERMINANTS OF HEALTH (SDOH)
HOW OFTEN DO YOU HAVE A DRINK CONTAINING ALCOHOL: PATIENT DECLINED
ARE YOU MARRIED, WIDOWED, DIVORCED, SEPARATED, NEVER MARRIED, OR LIVING WITH A PARTNER?: PATIENT DECLINED
HOW OFTEN DO YOU ATTENT MEETINGS OF THE CLUB OR ORGANIZATION YOU BELONG TO?: PATIENT DECLINED
HOW HARD IS IT FOR YOU TO PAY FOR THE VERY BASICS LIKE FOOD, HOUSING, MEDICAL CARE, AND HEATING?: PATIENT DECLINED
DO YOU BELONG TO ANY CLUBS OR ORGANIZATIONS SUCH AS CHURCH GROUPS UNIONS, FRATERNAL OR ATHLETIC GROUPS, OR SCHOOL GROUPS?: PATIENT DECLINED
HOW OFTEN DO YOU ATTEND CHURCH OR RELIGIOUS SERVICES?: PATIENT DECLINED
ARE YOU MARRIED, WIDOWED, DIVORCED, SEPARATED, NEVER MARRIED, OR LIVING WITH A PARTNER?: PATIENT DECLINED
HOW OFTEN DO YOU GET TOGETHER WITH FRIENDS OR RELATIVES?: PATIENT DECLINED
HOW MANY DRINKS CONTAINING ALCOHOL DO YOU HAVE ON A TYPICAL DAY WHEN YOU ARE DRINKING: PATIENT DECLINED
HOW OFTEN DO YOU GET TOGETHER WITH FRIENDS OR RELATIVES?: PATIENT DECLINED
HOW OFTEN DO YOU HAVE SIX OR MORE DRINKS ON ONE OCCASION: PATIENT DECLINED
HOW OFTEN DO YOU ATTEND CHURCH OR RELIGIOUS SERVICES?: PATIENT DECLINED
WITHIN THE PAST 12 MONTHS, YOU WORRIED THAT YOUR FOOD WOULD RUN OUT BEFORE YOU GOT THE MONEY TO BUY MORE: PATIENT DECLINED
HOW OFTEN DO YOU ATTENT MEETINGS OF THE CLUB OR ORGANIZATION YOU BELONG TO?: PATIENT DECLINED
IN A TYPICAL WEEK, HOW MANY TIMES DO YOU TALK ON THE PHONE WITH FAMILY, FRIENDS, OR NEIGHBORS?: PATIENT DECLINED
IN A TYPICAL WEEK, HOW MANY TIMES DO YOU TALK ON THE PHONE WITH FAMILY, FRIENDS, OR NEIGHBORS?: PATIENT DECLINED
DO YOU BELONG TO ANY CLUBS OR ORGANIZATIONS SUCH AS CHURCH GROUPS UNIONS, FRATERNAL OR ATHLETIC GROUPS, OR SCHOOL GROUPS?: PATIENT DECLINED

## 2023-11-30 ASSESSMENT — LIFESTYLE VARIABLES
HOW MANY STANDARD DRINKS CONTAINING ALCOHOL DO YOU HAVE ON A TYPICAL DAY: PATIENT DECLINED
AUDIT-C TOTAL SCORE: -1
HOW OFTEN DO YOU HAVE SIX OR MORE DRINKS ON ONE OCCASION: PATIENT DECLINED
HOW OFTEN DO YOU HAVE A DRINK CONTAINING ALCOHOL: PATIENT DECLINED
SKIP TO QUESTIONS 9-10: 0

## 2023-12-07 ENCOUNTER — OFFICE VISIT (OUTPATIENT)
Dept: MEDICAL GROUP | Facility: LAB | Age: 78
End: 2023-12-07
Payer: MEDICARE

## 2023-12-07 VITALS
SYSTOLIC BLOOD PRESSURE: 106 MMHG | OXYGEN SATURATION: 98 % | BODY MASS INDEX: 24.55 KG/M2 | WEIGHT: 130 LBS | RESPIRATION RATE: 12 BRPM | HEIGHT: 61 IN | DIASTOLIC BLOOD PRESSURE: 64 MMHG | HEART RATE: 89 BPM | TEMPERATURE: 98.3 F

## 2023-12-07 DIAGNOSIS — B00.9 HERPES SIMPLEX VIRUS (HSV) INFECTION: ICD-10-CM

## 2023-12-07 DIAGNOSIS — E78.49 OTHER HYPERLIPIDEMIA: ICD-10-CM

## 2023-12-07 DIAGNOSIS — R93.1 AGATSTON CAC SCORE, <100: ICD-10-CM

## 2023-12-07 DIAGNOSIS — K52.9 CHRONIC DIARRHEA OF UNKNOWN ORIGIN: ICD-10-CM

## 2023-12-07 DIAGNOSIS — F41.9 ANXIETY: ICD-10-CM

## 2023-12-07 DIAGNOSIS — Z12.31 ENCOUNTER FOR SCREENING MAMMOGRAM FOR MALIGNANT NEOPLASM OF BREAST: ICD-10-CM

## 2023-12-07 DIAGNOSIS — E55.9 VITAMIN D DEFICIENCY: ICD-10-CM

## 2023-12-07 DIAGNOSIS — E04.1 NONTOXIC SINGLE THYROID NODULE: ICD-10-CM

## 2023-12-07 DIAGNOSIS — E03.9 ACQUIRED HYPOTHYROIDISM: ICD-10-CM

## 2023-12-07 DIAGNOSIS — M85.80 OSTEOPENIA, UNSPECIFIED LOCATION: ICD-10-CM

## 2023-12-07 DIAGNOSIS — Z00.00 MEDICARE ANNUAL WELLNESS VISIT, SUBSEQUENT: ICD-10-CM

## 2023-12-07 DIAGNOSIS — L50.8 CHRONIC URTICARIA: ICD-10-CM

## 2023-12-07 PROCEDURE — 3074F SYST BP LT 130 MM HG: CPT | Performed by: FAMILY MEDICINE

## 2023-12-07 PROCEDURE — G0439 PPPS, SUBSEQ VISIT: HCPCS | Performed by: FAMILY MEDICINE

## 2023-12-07 PROCEDURE — 3078F DIAST BP <80 MM HG: CPT | Performed by: FAMILY MEDICINE

## 2023-12-07 RX ORDER — ACYCLOVIR 800 MG/1
800 TABLET ORAL 2 TIMES DAILY
Qty: 90 TABLET | Refills: 1 | Status: SHIPPED | OUTPATIENT
Start: 2023-12-07

## 2023-12-07 RX ORDER — LEVOTHYROXINE SODIUM 0.07 MG/1
75 TABLET ORAL
Qty: 30 TABLET | Refills: 0
Start: 2023-12-07

## 2023-12-07 ASSESSMENT — ENCOUNTER SYMPTOMS: GENERAL WELL-BEING: GOOD

## 2023-12-07 ASSESSMENT — FIBROSIS 4 INDEX: FIB4 SCORE: 1.6

## 2023-12-07 ASSESSMENT — ACTIVITIES OF DAILY LIVING (ADL): BATHING_REQUIRES_ASSISTANCE: 0

## 2023-12-07 ASSESSMENT — PATIENT HEALTH QUESTIONNAIRE - PHQ9: CLINICAL INTERPRETATION OF PHQ2 SCORE: 0
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
## 2023-12-21 RX ORDER — AMITRIPTYLINE HYDROCHLORIDE 100 MG/1
TABLET ORAL
Qty: 90 TABLET | Refills: 3 | Status: SHIPPED | OUTPATIENT
Start: 2023-12-21

## 2023-12-21 NOTE — TELEPHONE ENCOUNTER
----- Message from Diamond Castro sent at 12/20/2023 12:59 PM PST -----  Regarding: change in pharmacy  Pt stopped by her pharmacy is changing to [facility] on [address]. Sh needs her amitriplyline she is almost out.

## 2023-12-29 ENCOUNTER — HOSPITAL ENCOUNTER (OUTPATIENT)
Dept: LAB | Facility: MEDICAL CENTER | Age: 78
End: 2023-12-29
Attending: INTERNAL MEDICINE
Payer: MEDICARE

## 2023-12-29 LAB
T4 FREE SERPL-MCNC: 1.29 NG/DL (ref 0.93–1.7)
TSH SERPL DL<=0.005 MIU/L-ACNC: 1.04 UIU/ML (ref 0.38–5.33)

## 2023-12-29 PROCEDURE — 84443 ASSAY THYROID STIM HORMONE: CPT

## 2023-12-29 PROCEDURE — 36415 COLL VENOUS BLD VENIPUNCTURE: CPT

## 2023-12-29 PROCEDURE — 84439 ASSAY OF FREE THYROXINE: CPT

## 2024-01-23 ENCOUNTER — OFFICE VISIT (OUTPATIENT)
Dept: NEUROLOGY | Facility: MEDICAL CENTER | Age: 79
End: 2024-01-23
Attending: PSYCHIATRY & NEUROLOGY
Payer: MEDICARE

## 2024-01-23 VITALS
OXYGEN SATURATION: 94 % | WEIGHT: 131.61 LBS | BODY MASS INDEX: 24.85 KG/M2 | DIASTOLIC BLOOD PRESSURE: 62 MMHG | TEMPERATURE: 96.9 F | HEART RATE: 84 BPM | HEIGHT: 61 IN | SYSTOLIC BLOOD PRESSURE: 120 MMHG

## 2024-01-23 DIAGNOSIS — R51.9 CHRONIC NONINTRACTABLE HEADACHE, UNSPECIFIED HEADACHE TYPE: ICD-10-CM

## 2024-01-23 DIAGNOSIS — G89.29 CHRONIC NONINTRACTABLE HEADACHE, UNSPECIFIED HEADACHE TYPE: ICD-10-CM

## 2024-01-23 DIAGNOSIS — R93.0 ABNORMAL MRI OF HEAD: ICD-10-CM

## 2024-01-23 PROCEDURE — 99205 OFFICE O/P NEW HI 60 MIN: CPT | Performed by: PSYCHIATRY & NEUROLOGY

## 2024-01-23 PROCEDURE — 99212 OFFICE O/P EST SF 10 MIN: CPT | Performed by: PSYCHIATRY & NEUROLOGY

## 2024-01-23 ASSESSMENT — FIBROSIS 4 INDEX: FIB4 SCORE: 1.6

## 2024-01-23 ASSESSMENT — PATIENT HEALTH QUESTIONNAIRE - PHQ9: CLINICAL INTERPRETATION OF PHQ2 SCORE: 0
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
## 2024-01-23 NOTE — ASSESSMENT & PLAN NOTE
Patient states she has a area of dysesthesia and pressure on the scalp and the top of her head.  It may be related to a head being she had in that region about 2 to 3 years ago.  It has not changed in nature size or discomfort.

## 2024-01-23 NOTE — ASSESSMENT & PLAN NOTE
Has had multiple CT scans since 2006 when she has had head trauma.  One of the abnormal CT scan shows evidence of white matter changes.  Therefore she had an MRI in 2016 to evaluate.  The white matter changes look most consistent with ischemic white matter changes and also she has a history of head trauma.  Most recent was a fall where she hit her head on the ground.  That was in the fall 2023.

## 2024-02-07 ENCOUNTER — HOSPITAL ENCOUNTER (OUTPATIENT)
Dept: LAB | Facility: MEDICAL CENTER | Age: 79
End: 2024-02-07
Attending: FAMILY MEDICINE
Payer: MEDICARE

## 2024-02-07 DIAGNOSIS — R74.8 ELEVATED ALKALINE PHOSPHATASE LEVEL: ICD-10-CM

## 2024-02-07 DIAGNOSIS — E78.49 OTHER HYPERLIPIDEMIA: ICD-10-CM

## 2024-02-07 DIAGNOSIS — R93.1 AGATSTON CAC SCORE, <100: ICD-10-CM

## 2024-02-07 DIAGNOSIS — E04.1 NONTOXIC SINGLE THYROID NODULE: ICD-10-CM

## 2024-02-07 LAB
ALBUMIN SERPL BCP-MCNC: 4.3 G/DL (ref 3.2–4.9)
ALBUMIN/GLOB SERPL: 1.7 G/DL
ALP SERPL-CCNC: 118 U/L (ref 30–99)
ALT SERPL-CCNC: 13 U/L (ref 2–50)
ANION GAP SERPL CALC-SCNC: 9 MMOL/L (ref 7–16)
AST SERPL-CCNC: 18 U/L (ref 12–45)
BASOPHILS # BLD AUTO: 0.9 % (ref 0–1.8)
BASOPHILS # BLD: 0.05 K/UL (ref 0–0.12)
BILIRUB SERPL-MCNC: 0.5 MG/DL (ref 0.1–1.5)
BUN SERPL-MCNC: 9 MG/DL (ref 8–22)
CALCIUM ALBUM COR SERPL-MCNC: 9.1 MG/DL (ref 8.5–10.5)
CALCIUM SERPL-MCNC: 9.3 MG/DL (ref 8.4–10.2)
CHLORIDE SERPL-SCNC: 102 MMOL/L (ref 96–112)
CHOLEST SERPL-MCNC: 221 MG/DL (ref 100–199)
CO2 SERPL-SCNC: 26 MMOL/L (ref 20–33)
CREAT SERPL-MCNC: 0.64 MG/DL (ref 0.5–1.4)
EOSINOPHIL # BLD AUTO: 0.09 K/UL (ref 0–0.51)
EOSINOPHIL NFR BLD: 1.7 % (ref 0–6.9)
ERYTHROCYTE [DISTWIDTH] IN BLOOD BY AUTOMATED COUNT: 43.8 FL (ref 35.9–50)
FASTING STATUS PATIENT QL REPORTED: NORMAL
GFR SERPLBLD CREATININE-BSD FMLA CKD-EPI: 91 ML/MIN/1.73 M 2
GLOBULIN SER CALC-MCNC: 2.5 G/DL (ref 1.9–3.5)
GLUCOSE SERPL-MCNC: 94 MG/DL (ref 65–99)
HCT VFR BLD AUTO: 39.6 % (ref 37–47)
HDLC SERPL-MCNC: 105 MG/DL
HGB BLD-MCNC: 13.3 G/DL (ref 12–16)
IMM GRANULOCYTES # BLD AUTO: 0.01 K/UL (ref 0–0.11)
IMM GRANULOCYTES NFR BLD AUTO: 0.2 % (ref 0–0.9)
LDLC SERPL CALC-MCNC: 103 MG/DL
LYMPHOCYTES # BLD AUTO: 2.62 K/UL (ref 1–4.8)
LYMPHOCYTES NFR BLD: 48.7 % (ref 22–41)
MCH RBC QN AUTO: 32.4 PG (ref 27–33)
MCHC RBC AUTO-ENTMCNC: 33.6 G/DL (ref 32.2–35.5)
MCV RBC AUTO: 96.4 FL (ref 81.4–97.8)
MONOCYTES # BLD AUTO: 0.28 K/UL (ref 0–0.85)
MONOCYTES NFR BLD AUTO: 5.2 % (ref 0–13.4)
NEUTROPHILS # BLD AUTO: 2.33 K/UL (ref 1.82–7.42)
NEUTROPHILS NFR BLD: 43.3 % (ref 44–72)
NRBC # BLD AUTO: 0 K/UL
NRBC BLD-RTO: 0 /100 WBC (ref 0–0.2)
PLATELET # BLD AUTO: 214 K/UL (ref 164–446)
PMV BLD AUTO: 9.4 FL (ref 9–12.9)
POTASSIUM SERPL-SCNC: 4.4 MMOL/L (ref 3.6–5.5)
PROT SERPL-MCNC: 6.8 G/DL (ref 6–8.2)
RBC # BLD AUTO: 4.11 M/UL (ref 4.2–5.4)
SODIUM SERPL-SCNC: 137 MMOL/L (ref 135–145)
TRIGL SERPL-MCNC: 65 MG/DL (ref 0–149)
WBC # BLD AUTO: 5.4 K/UL (ref 4.8–10.8)

## 2024-02-07 PROCEDURE — 85025 COMPLETE CBC W/AUTO DIFF WBC: CPT

## 2024-02-07 PROCEDURE — 36415 COLL VENOUS BLD VENIPUNCTURE: CPT

## 2024-02-07 PROCEDURE — 80061 LIPID PANEL: CPT

## 2024-02-07 PROCEDURE — 80053 COMPREHEN METABOLIC PANEL: CPT

## 2024-02-13 ENCOUNTER — HOSPITAL ENCOUNTER (OUTPATIENT)
Dept: LAB | Facility: MEDICAL CENTER | Age: 79
End: 2024-02-13
Attending: FAMILY MEDICINE
Payer: MEDICARE

## 2024-02-13 DIAGNOSIS — R74.8 ELEVATED ALKALINE PHOSPHATASE LEVEL: ICD-10-CM

## 2024-02-13 PROCEDURE — 84080 ASSAY ALKALINE PHOSPHATASES: CPT

## 2024-02-13 PROCEDURE — 84075 ASSAY ALKALINE PHOSPHATASE: CPT

## 2024-02-13 PROCEDURE — 36415 COLL VENOUS BLD VENIPUNCTURE: CPT

## 2024-02-15 LAB
ALP BONE SERPL-CCNC: 50 U/L (ref 0–55)
ALP ISOS SERPL HS-CCNC: 0 U/L
ALP LIVER SERPL-CCNC: 68 U/L (ref 0–94)
ALP SERPL-CCNC: 118 U/L (ref 40–120)

## 2024-02-22 ENCOUNTER — OFFICE VISIT (OUTPATIENT)
Dept: MEDICAL GROUP | Facility: LAB | Age: 79
End: 2024-02-22
Payer: MEDICARE

## 2024-02-22 VITALS
BODY MASS INDEX: 24.77 KG/M2 | HEIGHT: 61 IN | WEIGHT: 131.2 LBS | HEART RATE: 88 BPM | SYSTOLIC BLOOD PRESSURE: 108 MMHG | RESPIRATION RATE: 12 BRPM | OXYGEN SATURATION: 98 % | DIASTOLIC BLOOD PRESSURE: 68 MMHG | TEMPERATURE: 96.9 F

## 2024-02-22 DIAGNOSIS — K59.09 CHRONIC CONSTIPATION: ICD-10-CM

## 2024-02-22 DIAGNOSIS — K52.9 CHRONIC DIARRHEA OF UNKNOWN ORIGIN: ICD-10-CM

## 2024-02-22 DIAGNOSIS — R74.8 ELEVATED ALKALINE PHOSPHATASE LEVEL: ICD-10-CM

## 2024-02-22 PROCEDURE — 99213 OFFICE O/P EST LOW 20 MIN: CPT | Performed by: FAMILY MEDICINE

## 2024-02-22 PROCEDURE — 3078F DIAST BP <80 MM HG: CPT | Performed by: FAMILY MEDICINE

## 2024-02-22 PROCEDURE — 3074F SYST BP LT 130 MM HG: CPT | Performed by: FAMILY MEDICINE

## 2024-02-22 ASSESSMENT — FIBROSIS 4 INDEX: FIB4 SCORE: 1.8

## 2024-04-12 ENCOUNTER — OFFICE VISIT (OUTPATIENT)
Dept: MEDICAL GROUP | Facility: LAB | Age: 79
End: 2024-04-12
Payer: MEDICARE

## 2024-04-12 ENCOUNTER — HOSPITAL ENCOUNTER (OUTPATIENT)
Facility: MEDICAL CENTER | Age: 79
End: 2024-04-12
Attending: FAMILY MEDICINE
Payer: MEDICARE

## 2024-04-12 VITALS
DIASTOLIC BLOOD PRESSURE: 60 MMHG | SYSTOLIC BLOOD PRESSURE: 120 MMHG | RESPIRATION RATE: 12 BRPM | OXYGEN SATURATION: 95 % | BODY MASS INDEX: 24.92 KG/M2 | TEMPERATURE: 98.1 F | HEART RATE: 85 BPM | WEIGHT: 132 LBS | HEIGHT: 61 IN

## 2024-04-12 DIAGNOSIS — K59.09 CHRONIC CONSTIPATION: ICD-10-CM

## 2024-04-12 DIAGNOSIS — N30.01 ACUTE CYSTITIS WITH HEMATURIA: ICD-10-CM

## 2024-04-12 DIAGNOSIS — N81.4 CYSTOCELE WITH PROLAPSE: ICD-10-CM

## 2024-04-12 LAB
APPEARANCE UR: CLEAR
BILIRUB UR STRIP-MCNC: ABNORMAL MG/DL
COLOR UR AUTO: ABNORMAL
GLUCOSE UR STRIP.AUTO-MCNC: ABNORMAL MG/DL
KETONES UR STRIP.AUTO-MCNC: ABNORMAL MG/DL
LEUKOCYTE ESTERASE UR QL STRIP.AUTO: ABNORMAL
NITRITE UR QL STRIP.AUTO: ABNORMAL
PH UR STRIP.AUTO: 6.5 [PH] (ref 5–8)
PROT UR QL STRIP: ABNORMAL MG/DL
RBC UR QL AUTO: ABNORMAL
SP GR UR STRIP.AUTO: 1.01
UROBILINOGEN UR STRIP-MCNC: 0.2 MG/DL

## 2024-04-12 PROCEDURE — 3074F SYST BP LT 130 MM HG: CPT | Performed by: FAMILY MEDICINE

## 2024-04-12 PROCEDURE — 81002 URINALYSIS NONAUTO W/O SCOPE: CPT | Performed by: FAMILY MEDICINE

## 2024-04-12 PROCEDURE — 87186 SC STD MICRODIL/AGAR DIL: CPT

## 2024-04-12 PROCEDURE — 99214 OFFICE O/P EST MOD 30 MIN: CPT | Performed by: FAMILY MEDICINE

## 2024-04-12 PROCEDURE — 87086 URINE CULTURE/COLONY COUNT: CPT

## 2024-04-12 PROCEDURE — 87077 CULTURE AEROBIC IDENTIFY: CPT

## 2024-04-12 PROCEDURE — 3078F DIAST BP <80 MM HG: CPT | Performed by: FAMILY MEDICINE

## 2024-04-12 RX ORDER — SULFAMETHOXAZOLE AND TRIMETHOPRIM 800; 160 MG/1; MG/1
1 TABLET ORAL 2 TIMES DAILY
Qty: 6 TABLET | Refills: 0 | Status: SHIPPED | OUTPATIENT
Start: 2024-04-12 | End: 2024-04-15

## 2024-04-12 ASSESSMENT — FIBROSIS 4 INDEX: FIB4 SCORE: 1.8

## 2024-04-12 NOTE — PATIENT INSTRUCTIONS
-START antibiotic for UTI  -Referrals are sent to GI and urogyn - will receive [email or web address] messages  -Increase miralax

## 2024-04-12 NOTE — PROGRESS NOTES
"Subjective:     CC: UTI, cystocele, chronic constipation    HPI:   Joie presents today with concern for UTI and to discuss chronic cystocele and constipation.    Has noted urinary frequency along with burning with urination over the last 4 to 5 days.  Has not had fevers.  No severe persistent flank pain.  No abdominal pain.  No recent UTI or history of recurrent UTIs.    She has a chronic cystocele that she notes seems to be worsening.  This was diagnosed by her OB in 2017.  Does feel like prolapse/fullness is worsening and she would like referral to GYN to discuss possible treatment measures.  OB had discussed surgical repair in the past.    She also has chronic constipation that can be severe at times.  She has had recent episodes of vomiting after straining for bowel movement.  Has been using enemas, MiraLAX, stool softener.  Did have normal colonoscopy in 2018 and wondering if she needs to discuss repeat colonoscopy earlier than the recommended 10 years.  Has not tried higher doses of MiraLAX and currently only using 1 capful daily.    Medications, past medical history, allergies, and social history have been reviewed and updated.      Objective:       Exam:  /60   Pulse 85   Temp 36.7 °C (98.1 °F) (Temporal)   Resp 12   Ht 1.549 m (5' 1\")   Wt 59.9 kg (132 lb)   SpO2 95%   BMI 24.94 kg/m²  Body mass index is 24.94 kg/m².    Constitutional: Alert. Well appearing. No distress.  Skin: Warm, dry, good turgor, no visible rashes.  ENMT: Moist mucous membranes. Normal dentition.  Respiratory: Normal effort.   Abdomen: Soft, nondistended, nontender.  No CVA tenderness.  Psych: Answers questions appropriately. Normal affect and mood.    POCT UA: Trace blood, trace LE      Assessment & Plan:     78 y.o. female with the following -     1. Acute cystitis with hematuria  Presents with 4 to 5 days of dysuria and frequency.  POCT UA with trace blood and LE.  Start Bactrim, culture sent.  - POCT Urinalysis  - " URINE CULTURE(NEW); Future  - sulfamethoxazole-trimethoprim (BACTRIM DS) 800-160 MG tablet; Take 1 Tablet by mouth 2 times a day for 3 days.  Dispense: 6 Tablet; Refill: 0    2. Cystocele with prolapse  Chronic issue diagnosed by her OB/GYN in 2017.  She does feel like prolapse/fullness is worsening and would like to discuss treatment options including possible surgical management.  Referral placed to urogynecology.  - Referral to Urogynecology    3. Chronic constipation  Chronic and severe at times.  May have contributed to UTI as above.  Recommended she increase her MiraLAX as currently using 1 capful daily.  We discussed that she could try significant increase and push towards diarrhea for a few days before finding a middle ground for 1-2 soft stools daily.  Can also continue stool softeners and enemas as needed.  She has concerns about needing repeat colonoscopy sooner than 2028.  Referral is placed to GI to discuss but will try measures as above in the meantime.  - Referral to Gastroenterology      Please note that this note was created using voice recognition software.

## 2024-04-15 LAB
BACTERIA UR CULT: ABNORMAL
BACTERIA UR CULT: ABNORMAL
SIGNIFICANT IND 70042: ABNORMAL
SITE SITE: ABNORMAL
SOURCE SOURCE: ABNORMAL

## 2024-04-16 DIAGNOSIS — A49.8 PSEUDOMONAS INFECTION: ICD-10-CM

## 2024-04-16 DIAGNOSIS — N30.01 ACUTE CYSTITIS WITH HEMATURIA: ICD-10-CM

## 2024-04-16 RX ORDER — CIPROFLOXACIN 500 MG/1
500 TABLET, FILM COATED ORAL 2 TIMES DAILY
Qty: 14 TABLET | Refills: 0 | Status: SHIPPED | OUTPATIENT
Start: 2024-04-16 | End: 2024-04-23

## 2024-04-23 ENCOUNTER — HOSPITAL ENCOUNTER (OUTPATIENT)
Facility: MEDICAL CENTER | Age: 79
End: 2024-04-23
Attending: FAMILY MEDICINE
Payer: MEDICARE

## 2024-04-23 ENCOUNTER — OFFICE VISIT (OUTPATIENT)
Dept: MEDICAL GROUP | Facility: LAB | Age: 79
End: 2024-04-23
Payer: MEDICARE

## 2024-04-23 VITALS
DIASTOLIC BLOOD PRESSURE: 64 MMHG | WEIGHT: 131 LBS | BODY MASS INDEX: 24.73 KG/M2 | HEIGHT: 61 IN | OXYGEN SATURATION: 97 % | HEART RATE: 83 BPM | RESPIRATION RATE: 12 BRPM | SYSTOLIC BLOOD PRESSURE: 120 MMHG | TEMPERATURE: 97.9 F

## 2024-04-23 DIAGNOSIS — A49.8 PSEUDOMONAS INFECTION: ICD-10-CM

## 2024-04-23 DIAGNOSIS — R19.7 ACUTE DIARRHEA: ICD-10-CM

## 2024-04-23 DIAGNOSIS — R30.0 DYSURIA: ICD-10-CM

## 2024-04-23 LAB
APPEARANCE UR: CLEAR
BILIRUB UR STRIP-MCNC: ABNORMAL MG/DL
COLOR UR AUTO: YELLOW
GLUCOSE UR STRIP.AUTO-MCNC: ABNORMAL MG/DL
KETONES UR STRIP.AUTO-MCNC: ABNORMAL MG/DL
LEUKOCYTE ESTERASE UR QL STRIP.AUTO: ABNORMAL
NITRITE UR QL STRIP.AUTO: ABNORMAL
PH UR STRIP.AUTO: 6.5 [PH] (ref 5–8)
PROT UR QL STRIP: ABNORMAL MG/DL
RBC UR QL AUTO: ABNORMAL
SP GR UR STRIP.AUTO: 1
UROBILINOGEN UR STRIP-MCNC: 0.2 MG/DL

## 2024-04-23 PROCEDURE — 81002 URINALYSIS NONAUTO W/O SCOPE: CPT | Performed by: FAMILY MEDICINE

## 2024-04-23 PROCEDURE — 87086 URINE CULTURE/COLONY COUNT: CPT

## 2024-04-23 PROCEDURE — 3074F SYST BP LT 130 MM HG: CPT | Performed by: FAMILY MEDICINE

## 2024-04-23 PROCEDURE — 3078F DIAST BP <80 MM HG: CPT | Performed by: FAMILY MEDICINE

## 2024-04-23 PROCEDURE — 99214 OFFICE O/P EST MOD 30 MIN: CPT | Performed by: FAMILY MEDICINE

## 2024-04-23 PROCEDURE — 87077 CULTURE AEROBIC IDENTIFY: CPT

## 2024-04-23 ASSESSMENT — FIBROSIS 4 INDEX: FIB4 SCORE: 1.8

## 2024-04-23 NOTE — PROGRESS NOTES
"Subjective:     CC: Follow up UTI    HPI:   Joie presents today follow-up on UTI.      Seen 4/12 with dysuria and frequency along with POCT UA consistent with UTI.  Culture resulted Pseudomonas, sensitive to ciprofloxacin and Bactrim was switched to Cipro.  She is on her seventh day of Cipro.  Dysuria has resolved, she does have some continued frequency but this may be due to increase in water intake.  She is not feeling ill and has not had fevers.  No leg pain or abdominal pain.    Is also concerned about some acute diarrhea.  Has had alternating constipation and diarrhea for years.  Constipation recently improved after enema but not having soft stools or symptoms watery diarrhea with some urgency.    Medications, past medical history, allergies, and social history have been reviewed and updated.      Objective:       Exam:  /64   Pulse 83   Temp 36.6 °C (97.9 °F) (Temporal)   Resp 12   Ht 1.549 m (5' 1\")   Wt 59.4 kg (131 lb)   SpO2 97%   BMI 24.75 kg/m²  Body mass index is 24.75 kg/m².    Constitutional: Alert. Well appearing. No distress.  Skin: Warm, dry, good turgor, no visible rashes.  ENMT: Moist mucous membranes.  Respiratory: Normal effort.   Neck: Soft, nontender, nondistended.  No CVA tenderness.  Neuro: Moves all four extremities. No facial droop.  Psych: Answers questions appropriately. Normal affect and mood.    POCT UA: trace blood, trace LE    Assessment & Plan:     78 y.o. female with the following -     1. Dysuria  2. Pseudomonas infection    UTI last week with culture resulting Pseudomonas sensitive to Cipro.  She is on her seventh day of Cipro and has had some continued frequency which may be secondary to fluid intake, dysuria has resolved.  She has not had any worsened symptoms including fevers or flank pain to suggest complicated UTI or systemic infection.  Repeat UA today with trace blood and LE, culture sent.  If culture positive and symptoms persist will have to consider " additional or prolonged antibiotic course.  If symptoms resolve and culture is positive may be colonization but we will plan to discuss based on this result.  For now plan to complete Cipro course and discuss after culture results.    - URINE CULTURE(NEW); Future    3. Acute diarrhea  Constipation recently resolved with enema but now having some soft stools and watery diarrhea with urgency.  May be secondary to antibiotics but plan for additional testing including C. difficile if this persists given her recent antibiotic use.      Please note that this note was created using voice recognition software.

## 2024-04-24 ENCOUNTER — APPOINTMENT (OUTPATIENT)
Dept: MEDICAL GROUP | Facility: LAB | Age: 79
End: 2024-04-24
Payer: MEDICARE

## 2024-04-25 ENCOUNTER — APPOINTMENT (OUTPATIENT)
Dept: MEDICAL GROUP | Facility: LAB | Age: 79
End: 2024-04-25
Payer: MEDICARE

## 2024-04-25 LAB
BACTERIA UR CULT: NORMAL
SIGNIFICANT IND 70042: NORMAL
SITE SITE: NORMAL
SOURCE SOURCE: NORMAL

## 2024-04-30 ENCOUNTER — APPOINTMENT (OUTPATIENT)
Dept: RADIOLOGY | Facility: MEDICAL CENTER | Age: 79
End: 2024-04-30
Attending: STUDENT IN AN ORGANIZED HEALTH CARE EDUCATION/TRAINING PROGRAM
Payer: MEDICARE

## 2024-04-30 ENCOUNTER — HOSPITAL ENCOUNTER (EMERGENCY)
Facility: MEDICAL CENTER | Age: 79
End: 2024-04-30
Attending: STUDENT IN AN ORGANIZED HEALTH CARE EDUCATION/TRAINING PROGRAM
Payer: MEDICARE

## 2024-04-30 ENCOUNTER — OFFICE VISIT (OUTPATIENT)
Dept: MEDICAL GROUP | Facility: LAB | Age: 79
End: 2024-04-30
Payer: MEDICARE

## 2024-04-30 VITALS
SYSTOLIC BLOOD PRESSURE: 165 MMHG | DIASTOLIC BLOOD PRESSURE: 93 MMHG | RESPIRATION RATE: 18 BRPM | HEART RATE: 83 BPM | WEIGHT: 131.9 LBS | BODY MASS INDEX: 24.9 KG/M2 | HEIGHT: 61 IN | OXYGEN SATURATION: 99 % | TEMPERATURE: 98.6 F

## 2024-04-30 VITALS
BODY MASS INDEX: 24.92 KG/M2 | HEIGHT: 61 IN | DIASTOLIC BLOOD PRESSURE: 64 MMHG | HEART RATE: 82 BPM | TEMPERATURE: 98.5 F | SYSTOLIC BLOOD PRESSURE: 122 MMHG | WEIGHT: 132 LBS | RESPIRATION RATE: 12 BRPM | OXYGEN SATURATION: 98 %

## 2024-04-30 DIAGNOSIS — S06.0X0A CONCUSSION WITHOUT LOSS OF CONSCIOUSNESS, INITIAL ENCOUNTER: ICD-10-CM

## 2024-04-30 DIAGNOSIS — S09.90XA CLOSED HEAD INJURY, INITIAL ENCOUNTER: ICD-10-CM

## 2024-04-30 DIAGNOSIS — S09.90XD TRAUMATIC INJURY OF HEAD, SUBSEQUENT ENCOUNTER: ICD-10-CM

## 2024-04-30 RX ORDER — ACETAMINOPHEN 325 MG/1
650 TABLET ORAL ONCE
Status: COMPLETED | OUTPATIENT
Start: 2024-04-30 | End: 2024-04-30

## 2024-04-30 RX ADMIN — ACETAMINOPHEN 650 MG: 325 TABLET ORAL at 04:58

## 2024-04-30 ASSESSMENT — FIBROSIS 4 INDEX
FIB4 SCORE: 1.8
FIB4 SCORE: 1.8

## 2024-04-30 ASSESSMENT — PAIN DESCRIPTION - DESCRIPTORS: DESCRIPTORS: ACHING

## 2024-04-30 NOTE — ED NOTES
Pt ambulates to triage with a chief complaint of head injury and neck pain secondary to hitting head on a cabinet on Friday afternoon. -LOC/Blood thinners. No c spine tenderness upon palpation.
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
## 2024-04-30 NOTE — ED TRIAGE NOTES
"Chief Complaint   Patient presents with    Neck Pain     Pt ambulates to triage with a chief complaint of head injury and neck pain secondary to hitting head on a cabinet on Friday afternoon. -LOC/Blood thinners. No c spine tenderness upon palpation.               BP (!) 165/93   Pulse 83   Temp 37 °C (98.6 °F) (Temporal)   Resp 18   Ht 1.549 m (5' 1\")   Wt 59.8 kg (131 lb 14.4 oz)   SpO2 99%   BMI 24.92 kg/m²     "

## 2024-04-30 NOTE — ED PROVIDER NOTES
ED Provider Note    CHIEF COMPLAINT  Chief Complaint   Patient presents with    Neck Pain     Pt ambulates to triage with a chief complaint of head injury and neck pain secondary to hitting head on a cabinet on Friday afternoon. -LOC/Blood thinners. No c spine tenderness upon palpation.                 EXTERNAL RECORDS REVIEWED  Outpatient PCP note reviewed from medical history    HPI/ROS  LIMITATION TO HISTORY   Select: : None  OUTSIDE HISTORIAN(S):   providing clinically relevant collateral history    Joie Hart is a 78 y.o. female with a past medical history of chronic neck pain, depression, anxiety presented to the emergency department for neck pain and headache.  Patient says that she was doing some work in the closet approximately 5 days ago, when she stood up and cracked the back of her head on the door of a cabinet.  Denies loss of consciousness.  Says that she has been having mild headache and exacerbation of her chronic neck pain since the incident.  This evening she was having difficulty sleeping and having a severe headache in the area where she hit her head so she decided to come to the emergency department for evaluation.  She says that her neck pain is essentially at her baseline.  She has no numbness weakness tingling in her arms or legs.  Denies any other injuries.  No other symptoms.  No chest pain abdominal pain, fever, chills.  No ear pain.  No diplopia.    PAST MEDICAL HISTORY   has a past medical history of Allergy, Anemia, Anxiety, Cataract, Chronic back pain, Depression (2009), Diverticulitis, Glaucoma, Head injury, closed (3/6/16), HSV infection, IBD (inflammatory bowel disease), Migraine, Pelvic fracture (HCC) (1976), Spondylisthesis, Thyroid disease, and Vaginal vault prolapse (2006).    SURGICAL HISTORY   has a past surgical history that includes primary c section (1983); elbow arthrotomy (Right); shoulder arthroscopy (Right, 2006); full thickness skin graft (Right,  "); and cataract extraction with iol (Bilateral).    FAMILY HISTORY  Family History   Problem Relation Age of Onset    Other Mother         trauma    Alcohol/Drug Mother     Diabetes Father     Hypertension Father     Alzheimer's Disease Brother     Alcohol/Drug Brother     Cancer Paternal Aunt         breast       SOCIAL HISTORY  Social History     Tobacco Use    Smoking status: Former     Current packs/day: 0.00     Average packs/day: 1 pack/day for 25.0 years (25.0 ttl pk-yrs)     Types: Cigarettes     Start date: 1955     Quit date: 1980     Years since quittin.8    Smokeless tobacco: Never   Vaping Use    Vaping Use: Never used   Substance and Sexual Activity    Alcohol use: Not Currently     Alcohol/week: 4.2 oz     Types: 7 Standard drinks or equivalent per week     Comment: 1 a day    Drug use: No    Sexual activity: Not Currently     Partners: Male       CURRENT MEDICATIONS  Home Medications       Reviewed by Sylvie Leon R.N. (Registered Nurse) on 24 at 0446  Med List Status: Partial     Medication Last Dose Status   acyclovir (ZOVIRAX) 800 MG Tab  Active   amitriptyline (ELAVIL) 100 MG Tab  Active   Ascorbic Acid (VITAMIN C) 1000 MG Tab  Active   Butalbital-Acetaminophen  MG Tab  Active   Cholecalciferol (VITAMIN D) 125 MCG (5000 UT) Cap  Active   diphenhydrAMINE-APAP, sleep, (TYLENOL PM EXTRA STRENGTH)  MG Tab  Active   hydrOXYzine HCl (ATARAX) 25 MG Tab  Active   levothyroxine (SYNTHROID) 75 MCG Tab  Active   Melatonin 10 MG Tab  Active   NON SPECIFIED  Active   therapeutic multivitamin-minerals (THERAGRAN-M) Tab  Active                    ALLERGIES  Allergies   Allergen Reactions    Codeine Rash     Rash all over           PHYSICAL EXAM  VITAL SIGNS: BP (!) 165/93   Pulse 83   Temp 37 °C (98.6 °F) (Temporal)   Resp 18   Ht 1.549 m (5' 1\")   Wt 59.8 kg (131 lb 14.4 oz)   SpO2 99%   BMI 24.92 kg/m²    General: Well- appearing , non-toxic, no acute " distress  Neuro: GCS 15, moving all extremities  HEENT:   - Head: Normocephalic, atraumatic.  Mild tenderness palpation left superior occipital scalp, no cephalohematoma  - Eyes: PERRL, no periorbital ecchymosis  - Midface: Atraumatic and stable  - Ears/Nose: normal external nose and ears, no retroauricular ecchymosis  - Mouth: moist mucosal membranes, atraumatic  Neck: No midline tenderness palpation, full range of motion in lateral rotation, flexion, extension  Chest: Atraumatic, no crepitus or tenderness palpation  Resp: clear to auscultation, no increased work of breathing  CV: Regular rate and rhythm, perfusing well  Abd: Soft, non-tender, non-distended  Pelvis: Stable on anterior posterior compression  Extremities:   RUE: Atraumatic, nontender to palpation, 2+ radial pulse, SILT, strength intact  LUE: Atraumatic, nontender to palpation, 2+ radial pulse, SILT, strength intact  RLE: Atraumatic, nontender to palpation, 2+ DP pulse, SILT, strength intact  LLE: Atraumatic, nontender to palpation, 2+ DP pulse, SILT, strength intact  Back: Atraumatic, no midline tenderness palpation    DIAGNOSTIC STUDIES / PROCEDURES    EKG  My independent EKG interpretation:  Results for orders placed or performed during the hospital encounter of 19   EKG (NOW)   Result Value Ref Range    Report       [facility]    Test Date:  2019  Pt Name:    REENA BORRERO              Department: [facility]  MRN:        [id number]                      Room:       -ROOM 7  Gender:     Female                       Technician: MIHAELA  :        1945                   Requested By:DYLAN SHER  Order #:    [id number]                    Reading MD: DYLAN SHER MD    Measurements  Intervals                                Axis  Rate:       78                           P:          67  NM:         164                          QRS:        30  QRSD:       76                           T:           61  QT:         380  QTc:        433    Interpretive Statements  SINUS RHYTHM  Compared to ECG 2018 01:06:53  No significant changes    Electronically Signed On 2019 11:47:51 PDT by DYLAN SHER MD     EKG in four (4) hours   Result Value Ref Range    Report       [facility]    Test Date:  2019  Pt Name:    REENA BORRERO              Department: [facility]  MRN:        [id number]                      Room:       3304  Gender:     Female                       Technician: TG  :        1945                   Requested By:HORTENCIA TAYLOR  Order #:    [id number]                    Reading MD: Vanessa Walters    Measurements  Intervals                                Axis  Rate:       79                           P:          58  NC:         158                          QRS:        18  QRSD:       79                           T:          44  QT:         374  QTc:        429    Interpretive Statements  Sinus rhythm  Compared to ECG 2019 10:34:29  No significant changes    Electronically Signed On 2019 21:49:14 PDT by Vanessa Walters         LABS  Results for orders placed or performed during the hospital encounter of 24   URINE CULTURE(NEW)    Specimen: Urine   Result Value Ref Range    Significant Indicator NEG     Source UR     Site -     Culture Result Usual skin yogesh 10-50,000 cfu/mL        RADIOLOGY  I have independently interpreted the diagnostic imaging associated with this visit and am waiting the final reading from the radiologist.   My preliminary interpretation is as follows:   - Reviewed CT scan of the head which shows no evidence of acute intracranial process.  Radiologist interpretation:   CT-CSPINE WITHOUT PLUS RECONS   Final Result         1.  Multilevel degenerative changes of the cervical spine limit diagnostic sensitivity of this examination, otherwise no acute traumatic bony injury of the cervical spine is apparent.      CT-HEAD W/O   Final Result          1.  No acute intracranial abnormality is identified, there are nonspecific white matter changes, commonly associated with small vessel ischemic disease.  Associated mild cerebral atrophy is noted.                 MEDICAL DECISION MAKING      ED COURSE AND PLAN    Joie Hart is a 78 y.o. female presenting to the emergency department for head trauma sustained about 5 days ago.  She has no external signs of trauma on exam.  She does have mild neck pain and headache in the area where she hit her head.   Obtain a CT scan of the head and cervical spine which shows no evidence of acute intracranial or cervical pathology.  After treatment with Tylenol, patient is feeling better and is appropriate for discharge home.  Advised on concussion precautions.  Strict return precautions discussed.      ---Pertinent ED Course---:    5:26 AM I reviewed the patient's old records in Epic, medication list, allergies, past medical history and performed a physical examination.               Procedures:      ----------------------------------------------------------------------------------  DISCUSSIONS    I have discussed management of the patient with the following physicians and PAULA's:      Discussion of management with other [facility] or appropriate source(s):     Escalation of care considered, and ultimately not performed:    Barriers to care at this time, including but not limited to:     Decision tools and prescription drugs considered including, but not limited to:     FINAL IMPRESSION    1. Closed head injury, initial encounter        Discharge Medication List as of 4/30/2024  5:44 AM            DISPOSITION    Discharge home, Stable          This chart was dictated using an electronic voice recognition software. The chart has been reviewed and edited but there is still possibility for dictation errors due to limitation of software.    Erick Mauricio, DO 4/30/2024

## 2024-05-03 ENCOUNTER — APPOINTMENT (OUTPATIENT)
Dept: MEDICAL GROUP | Facility: LAB | Age: 79
End: 2024-05-03
Payer: MEDICARE

## 2024-05-11 ENCOUNTER — HOSPITAL ENCOUNTER (OUTPATIENT)
Dept: LAB | Facility: MEDICAL CENTER | Age: 79
End: 2024-05-11
Attending: STUDENT IN AN ORGANIZED HEALTH CARE EDUCATION/TRAINING PROGRAM
Payer: MEDICARE

## 2024-05-11 LAB
T4 FREE SERPL-MCNC: 1.35 NG/DL (ref 0.93–1.7)
TSH SERPL DL<=0.005 MIU/L-ACNC: 0.96 UIU/ML (ref 0.38–5.33)

## 2024-05-28 ENCOUNTER — PATIENT MESSAGE (OUTPATIENT)
Dept: MEDICAL GROUP | Facility: LAB | Age: 79
End: 2024-05-28
Payer: MEDICARE

## 2024-05-28 DIAGNOSIS — G89.29 CHRONIC NECK PAIN: ICD-10-CM

## 2024-05-28 DIAGNOSIS — M54.2 CHRONIC NECK PAIN: ICD-10-CM

## 2024-05-29 RX ORDER — IBUPROFEN 800 MG/1
800 TABLET ORAL EVERY 8 HOURS PRN
Qty: 60 TABLET | Refills: 1 | Status: SHIPPED | OUTPATIENT
Start: 2024-05-29

## 2024-05-29 NOTE — PROGRESS NOTES
Discussed with patient over the phone.  She has been doing a maximum of 3000 mg Tylenol daily and a maximum of 1200 mg ibuprofen daily.  Would like Rx for 800 mg ibuprofen which has been more effective for her chronic neck pain in the past.  Again clarified maximum Tylenol and ibuprofen dosages.  Rx is sent.

## 2024-06-18 ENCOUNTER — OFFICE VISIT (OUTPATIENT)
Dept: NEUROLOGY | Facility: MEDICAL CENTER | Age: 79
End: 2024-06-18
Attending: PSYCHIATRY & NEUROLOGY
Payer: MEDICARE

## 2024-06-18 VITALS
SYSTOLIC BLOOD PRESSURE: 110 MMHG | TEMPERATURE: 97.4 F | DIASTOLIC BLOOD PRESSURE: 62 MMHG | BODY MASS INDEX: 24.6 KG/M2 | HEART RATE: 79 BPM | OXYGEN SATURATION: 97 % | HEIGHT: 61 IN | WEIGHT: 130.29 LBS | RESPIRATION RATE: 15 BRPM

## 2024-06-18 DIAGNOSIS — R51.9 CHRONIC NONINTRACTABLE HEADACHE, UNSPECIFIED HEADACHE TYPE: ICD-10-CM

## 2024-06-18 DIAGNOSIS — S06.0XAD CONCUSSION WITH UNKNOWN LOSS OF CONSCIOUSNESS STATUS, SUBSEQUENT ENCOUNTER: ICD-10-CM

## 2024-06-18 DIAGNOSIS — G89.29 CHRONIC NONINTRACTABLE HEADACHE, UNSPECIFIED HEADACHE TYPE: ICD-10-CM

## 2024-06-18 PROBLEM — S06.0XAA CONCUSSION WITH UNKNOWN LOSS OF CONSCIOUSNESS STATUS: Status: ACTIVE | Noted: 2024-06-18

## 2024-06-18 PROCEDURE — 99215 OFFICE O/P EST HI 40 MIN: CPT | Performed by: PSYCHIATRY & NEUROLOGY

## 2024-06-18 PROCEDURE — 3078F DIAST BP <80 MM HG: CPT | Performed by: PSYCHIATRY & NEUROLOGY

## 2024-06-18 PROCEDURE — 99212 OFFICE O/P EST SF 10 MIN: CPT | Performed by: PSYCHIATRY & NEUROLOGY

## 2024-06-18 PROCEDURE — 3074F SYST BP LT 130 MM HG: CPT | Performed by: PSYCHIATRY & NEUROLOGY

## 2024-06-18 ASSESSMENT — FIBROSIS 4 INDEX: FIB4 SCORE: 1.8
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
## 2024-06-18 NOTE — ASSESSMENT & PLAN NOTE
Pt states that she has had a lot of headaches since the injury and she was taking OTC NSAIDS after seeing doctor Anshu. She also has a lot of neck pain and she wakes up every morning with a headaches. Pt states she has ordered a new pillow. Pt states that she has a thumper for her back and she rests on a heating pad and she takes an ibuprofen 800mg. She has not been able to focus on anything. She could not organize her thoughts. She took a walk with her dog and she has intermittent good days and bad days. Pt states her neck has bothered her frequently. She went to her eye doctor and she also has a floater in her left eye and she also has macular degeneration. Pt feels like she has more space in between her thoughts. She has been sleeping well and she sleeps long hours and she has always needed a lot of sleep. Pt states that she takes a rest around 2 pm. Pt states she lays down because she has joint pain.
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
## 2024-07-01 ENCOUNTER — APPOINTMENT (OUTPATIENT)
Dept: NEUROLOGY | Facility: MEDICAL CENTER | Age: 79
End: 2024-07-01
Attending: PSYCHIATRY & NEUROLOGY
Payer: MEDICARE

## 2024-09-18 ENCOUNTER — HOSPITAL ENCOUNTER (OUTPATIENT)
Dept: LAB | Facility: MEDICAL CENTER | Age: 79
End: 2024-09-18
Attending: PHYSICIAN ASSISTANT
Payer: MEDICARE

## 2024-09-18 LAB
T4 FREE SERPL-MCNC: 1.18 NG/DL (ref 0.93–1.7)
TSH SERPL DL<=0.005 MIU/L-ACNC: 1.75 UIU/ML (ref 0.38–5.33)

## 2024-09-18 PROCEDURE — 84439 ASSAY OF FREE THYROXINE: CPT

## 2024-09-18 PROCEDURE — 36415 COLL VENOUS BLD VENIPUNCTURE: CPT

## 2024-09-18 PROCEDURE — 84443 ASSAY THYROID STIM HORMONE: CPT

## 2024-09-30 ENCOUNTER — OFFICE VISIT (OUTPATIENT)
Dept: NEUROLOGY | Facility: MEDICAL CENTER | Age: 79
End: 2024-09-30
Attending: PSYCHIATRY & NEUROLOGY
Payer: MEDICARE

## 2024-09-30 VITALS
TEMPERATURE: 96.9 F | DIASTOLIC BLOOD PRESSURE: 60 MMHG | BODY MASS INDEX: 24.93 KG/M2 | SYSTOLIC BLOOD PRESSURE: 122 MMHG | OXYGEN SATURATION: 96 % | HEIGHT: 61 IN | HEART RATE: 75 BPM | WEIGHT: 132.06 LBS

## 2024-09-30 DIAGNOSIS — G60.9 IDIOPATHIC PERIPHERAL NEUROPATHY: ICD-10-CM

## 2024-09-30 DIAGNOSIS — R41.3 COMPLAINTS OF MEMORY DISTURBANCE: ICD-10-CM

## 2024-09-30 DIAGNOSIS — R73.9 HYPERGLYCEMIA: ICD-10-CM

## 2024-09-30 DIAGNOSIS — S06.0XAD CONCUSSION WITH UNKNOWN LOSS OF CONSCIOUSNESS STATUS, SUBSEQUENT ENCOUNTER: ICD-10-CM

## 2024-09-30 PROCEDURE — 99212 OFFICE O/P EST SF 10 MIN: CPT | Performed by: PSYCHIATRY & NEUROLOGY

## 2024-09-30 ASSESSMENT — FIBROSIS 4 INDEX: FIB4 SCORE: 1.82

## 2024-09-30 ASSESSMENT — PATIENT HEALTH QUESTIONNAIRE - PHQ9: CLINICAL INTERPRETATION OF PHQ2 SCORE: 0

## 2024-10-01 NOTE — ASSESSMENT & PLAN NOTE
Patient states she does notice her short-term memory is not as good as it used to be so we will do a [place] today.  She states she has had some cognitive testing with her primary care at her yearly Medicare visits.  She states that she has noticed this is gradually somewhat worse than it used to be.

## 2024-10-01 NOTE — ASSESSMENT & PLAN NOTE
Patient states that her neck and head pain have gotten somewhat better with physical therapy and with strengthening and stretching exercises.  Patient also has a good pillow.
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
## 2024-10-01 NOTE — ASSESSMENT & PLAN NOTE
Patient comes in with a new complaint today of numbness and tingling in her feet bilaterally.  It does migrate up the leg.  She does not have any in her hands dramatically.  Patient states this is been gradually getting worse and she has been pain more attention to it now that her headaches have gotten better.  Patient states that she does have a history of high glucose and she used to drinkAlcohol for about 2 decades 1-3 drinks a day usually cocktails.  Patient has since stopped drinking and is trying to eat healthier.

## 2024-10-22 ENCOUNTER — PATIENT MESSAGE (OUTPATIENT)
Dept: MEDICAL GROUP | Facility: LAB | Age: 79
End: 2024-10-22
Payer: MEDICARE

## 2024-10-22 DIAGNOSIS — G89.29 CHRONIC NECK PAIN: ICD-10-CM

## 2024-10-22 DIAGNOSIS — M54.2 CHRONIC NECK PAIN: ICD-10-CM

## 2024-10-22 RX ORDER — IBUPROFEN 800 MG/1
800 TABLET, FILM COATED ORAL EVERY 8 HOURS PRN
Qty: 60 TABLET | Refills: 1 | Status: SHIPPED | OUTPATIENT
Start: 2024-10-22

## 2024-11-26 DIAGNOSIS — M54.2 CHRONIC NECK PAIN: ICD-10-CM

## 2024-11-26 DIAGNOSIS — G89.29 CHRONIC NECK PAIN: ICD-10-CM

## 2024-11-26 RX ORDER — IBUPROFEN 800 MG/1
800 TABLET, FILM COATED ORAL EVERY 8 HOURS PRN
Qty: 60 TABLET | Refills: 0 | Status: SHIPPED | OUTPATIENT
Start: 2024-11-26

## 2024-11-26 NOTE — TELEPHONE ENCOUNTER
Received request via: Self     Was the patient seen in the last year in this department? Yes    Does the patient have an active prescription (recently filled or refills available) for medication(s) requested? No    Pharmacy Name: [facility]     Does the patient have [facility] and need 100-day supply? (This applies to ALL medications) Patient does not have SCP

## 2025-01-07 ENCOUNTER — APPOINTMENT (OUTPATIENT)
Dept: NEUROLOGY | Facility: MEDICAL CENTER | Age: 80
End: 2025-01-07
Attending: PSYCHIATRY & NEUROLOGY
Payer: MEDICARE

## 2025-01-07 RX ORDER — AMITRIPTYLINE HYDROCHLORIDE 100 MG/1
TABLET ORAL
Qty: 90 TABLET | Refills: 3 | Status: SHIPPED | OUTPATIENT
Start: 2025-01-07

## 2025-01-07 NOTE — TELEPHONE ENCOUNTER
Received request via: Pharmacy    Was the patient seen in the last year in this department? Yes    Does the patient have an active prescription (recently filled or refills available) for medication(s) requested? No    Pharmacy Name: [facility]     Does the patient have care home Plus and need 100-day supply? (This applies to ALL medications) Patient does not have SCP

## 2025-01-30 SDOH — HEALTH STABILITY: PHYSICAL HEALTH: ON AVERAGE, HOW MANY DAYS PER WEEK DO YOU ENGAGE IN MODERATE TO STRENUOUS EXERCISE (LIKE A BRISK WALK)?: 6 DAYS

## 2025-01-30 SDOH — ECONOMIC STABILITY: FOOD INSECURITY: WITHIN THE PAST 12 MONTHS, THE FOOD YOU BOUGHT JUST DIDN'T LAST AND YOU DIDN'T HAVE MONEY TO GET MORE.: PATIENT DECLINED

## 2025-01-30 SDOH — HEALTH STABILITY: PHYSICAL HEALTH: ON AVERAGE, HOW MANY MINUTES DO YOU ENGAGE IN EXERCISE AT THIS LEVEL?: 40 MIN

## 2025-01-30 SDOH — ECONOMIC STABILITY: TRANSPORTATION INSECURITY
IN THE PAST 12 MONTHS, HAS LACK OF TRANSPORTATION KEPT YOU FROM MEETINGS, WORK, OR FROM GETTING THINGS NEEDED FOR DAILY LIVING?: NO

## 2025-01-30 SDOH — ECONOMIC STABILITY: TRANSPORTATION INSECURITY
IN THE PAST 12 MONTHS, HAS THE LACK OF TRANSPORTATION KEPT YOU FROM MEDICAL APPOINTMENTS OR FROM GETTING MEDICATIONS?: NO

## 2025-01-30 SDOH — ECONOMIC STABILITY: FOOD INSECURITY: WITHIN THE PAST 12 MONTHS, YOU WORRIED THAT YOUR FOOD WOULD RUN OUT BEFORE YOU GOT MONEY TO BUY MORE.: PATIENT DECLINED

## 2025-01-30 SDOH — HEALTH STABILITY: MENTAL HEALTH
STRESS IS WHEN SOMEONE FEELS TENSE, NERVOUS, ANXIOUS, OR CAN'T SLEEP AT NIGHT BECAUSE THEIR MIND IS TROUBLED. HOW STRESSED ARE YOU?: ONLY A LITTLE

## 2025-01-30 SDOH — ECONOMIC STABILITY: INCOME INSECURITY: IN THE LAST 12 MONTHS, WAS THERE A TIME WHEN YOU WERE NOT ABLE TO PAY THE MORTGAGE OR RENT ON TIME?: PATIENT DECLINED

## 2025-01-30 SDOH — ECONOMIC STABILITY: INCOME INSECURITY: HOW HARD IS IT FOR YOU TO PAY FOR THE VERY BASICS LIKE FOOD, HOUSING, MEDICAL CARE, AND HEATING?: PATIENT DECLINED

## 2025-01-30 SDOH — ECONOMIC STABILITY: HOUSING INSECURITY
IN THE LAST 12 MONTHS, WAS THERE A TIME WHEN YOU DID NOT HAVE A STEADY PLACE TO SLEEP OR SLEPT IN A SHELTER (INCLUDING NOW)?: PATIENT DECLINED

## 2025-01-30 SDOH — ECONOMIC STABILITY: TRANSPORTATION INSECURITY
IN THE PAST 12 MONTHS, HAS LACK OF RELIABLE TRANSPORTATION KEPT YOU FROM MEDICAL APPOINTMENTS, MEETINGS, WORK OR FROM GETTING THINGS NEEDED FOR DAILY LIVING?: NO

## 2025-01-30 ASSESSMENT — SOCIAL DETERMINANTS OF HEALTH (SDOH)
DO YOU BELONG TO ANY CLUBS OR ORGANIZATIONS SUCH AS CHURCH GROUPS UNIONS, FRATERNAL OR ATHLETIC GROUPS, OR SCHOOL GROUPS?: PATIENT DECLINED
HOW OFTEN DO YOU ATTENT MEETINGS OF THE CLUB OR ORGANIZATION YOU BELONG TO?: PATIENT DECLINED
WITHIN THE PAST 12 MONTHS, YOU WORRIED THAT YOUR FOOD WOULD RUN OUT BEFORE YOU GOT THE MONEY TO BUY MORE: PATIENT DECLINED
HOW OFTEN DO YOU HAVE SIX OR MORE DRINKS ON ONE OCCASION: NEVER
DO YOU BELONG TO ANY CLUBS OR ORGANIZATIONS SUCH AS CHURCH GROUPS UNIONS, FRATERNAL OR ATHLETIC GROUPS, OR SCHOOL GROUPS?: PATIENT DECLINED
HOW OFTEN DO YOU GET TOGETHER WITH FRIENDS OR RELATIVES?: PATIENT DECLINED
IN A TYPICAL WEEK, HOW MANY TIMES DO YOU TALK ON THE PHONE WITH FAMILY, FRIENDS, OR NEIGHBORS?: PATIENT DECLINED
IN THE PAST 12 MONTHS, HAS THE ELECTRIC, GAS, OIL, OR WATER COMPANY THREATENED TO SHUT OFF SERVICE IN YOUR HOME?: NO
HOW OFTEN DO YOU ATTEND CHURCH OR RELIGIOUS SERVICES?: PATIENT DECLINED
HOW OFTEN DO YOU ATTEND CHURCH OR RELIGIOUS SERVICES?: PATIENT DECLINED
HOW HARD IS IT FOR YOU TO PAY FOR THE VERY BASICS LIKE FOOD, HOUSING, MEDICAL CARE, AND HEATING?: PATIENT DECLINED
IN A TYPICAL WEEK, HOW MANY TIMES DO YOU TALK ON THE PHONE WITH FAMILY, FRIENDS, OR NEIGHBORS?: PATIENT DECLINED
HOW OFTEN DO YOU ATTENT MEETINGS OF THE CLUB OR ORGANIZATION YOU BELONG TO?: PATIENT DECLINED
HOW OFTEN DO YOU HAVE A DRINK CONTAINING ALCOHOL: NEVER
HOW OFTEN DO YOU GET TOGETHER WITH FRIENDS OR RELATIVES?: PATIENT DECLINED
HOW MANY DRINKS CONTAINING ALCOHOL DO YOU HAVE ON A TYPICAL DAY WHEN YOU ARE DRINKING: PATIENT DOES NOT DRINK

## 2025-01-30 ASSESSMENT — LIFESTYLE VARIABLES
AUDIT-C TOTAL SCORE: 0
HOW OFTEN DO YOU HAVE SIX OR MORE DRINKS ON ONE OCCASION: NEVER
SKIP TO QUESTIONS 9-10: 1
HOW MANY STANDARD DRINKS CONTAINING ALCOHOL DO YOU HAVE ON A TYPICAL DAY: PATIENT DOES NOT DRINK
HOW OFTEN DO YOU HAVE A DRINK CONTAINING ALCOHOL: NEVER

## 2025-02-06 ENCOUNTER — APPOINTMENT (OUTPATIENT)
Dept: MEDICAL GROUP | Facility: LAB | Age: 80
End: 2025-02-06
Payer: MEDICARE

## 2025-02-06 VITALS
HEART RATE: 89 BPM | TEMPERATURE: 97.4 F | SYSTOLIC BLOOD PRESSURE: 104 MMHG | OXYGEN SATURATION: 95 % | HEIGHT: 61 IN | WEIGHT: 132.4 LBS | DIASTOLIC BLOOD PRESSURE: 66 MMHG | RESPIRATION RATE: 14 BRPM | BODY MASS INDEX: 25 KG/M2

## 2025-02-06 DIAGNOSIS — G89.29 CHRONIC NECK PAIN: ICD-10-CM

## 2025-02-06 DIAGNOSIS — R51.9 CHRONIC NONINTRACTABLE HEADACHE, UNSPECIFIED HEADACHE TYPE: ICD-10-CM

## 2025-02-06 DIAGNOSIS — E55.9 VITAMIN D DEFICIENCY: ICD-10-CM

## 2025-02-06 DIAGNOSIS — R73.01 ELEVATED FASTING BLOOD SUGAR: ICD-10-CM

## 2025-02-06 DIAGNOSIS — Z00.00 MEDICARE ANNUAL WELLNESS VISIT, SUBSEQUENT: ICD-10-CM

## 2025-02-06 DIAGNOSIS — M85.89 OSTEOPENIA OF MULTIPLE SITES: ICD-10-CM

## 2025-02-06 DIAGNOSIS — G89.29 CHRONIC NONINTRACTABLE HEADACHE, UNSPECIFIED HEADACHE TYPE: ICD-10-CM

## 2025-02-06 DIAGNOSIS — E78.49 OTHER HYPERLIPIDEMIA: ICD-10-CM

## 2025-02-06 DIAGNOSIS — E03.9 ACQUIRED HYPOTHYROIDISM: ICD-10-CM

## 2025-02-06 DIAGNOSIS — M54.2 CHRONIC NECK PAIN: ICD-10-CM

## 2025-02-06 PROBLEM — K52.9 CHRONIC DIARRHEA OF UNKNOWN ORIGIN: Status: RESOLVED | Noted: 2018-06-07 | Resolved: 2025-02-06

## 2025-02-06 PROBLEM — S06.0XAA CONCUSSION WITH UNKNOWN LOSS OF CONSCIOUSNESS STATUS: Status: RESOLVED | Noted: 2024-06-18 | Resolved: 2025-02-06

## 2025-02-06 PROCEDURE — 3078F DIAST BP <80 MM HG: CPT | Performed by: FAMILY MEDICINE

## 2025-02-06 PROCEDURE — 3074F SYST BP LT 130 MM HG: CPT | Performed by: FAMILY MEDICINE

## 2025-02-06 PROCEDURE — G0439 PPPS, SUBSEQ VISIT: HCPCS | Performed by: FAMILY MEDICINE

## 2025-02-06 ASSESSMENT — ACTIVITIES OF DAILY LIVING (ADL): BATHING_REQUIRES_ASSISTANCE: 0

## 2025-02-06 ASSESSMENT — PATIENT HEALTH QUESTIONNAIRE - PHQ9: CLINICAL INTERPRETATION OF PHQ2 SCORE: 0

## 2025-02-06 ASSESSMENT — ENCOUNTER SYMPTOMS: GENERAL WELL-BEING: GOOD

## 2025-02-06 ASSESSMENT — FIBROSIS 4 INDEX: FIB4 SCORE: 1.82
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
## 2025-02-14 ENCOUNTER — HOSPITAL ENCOUNTER (OUTPATIENT)
Dept: LAB | Facility: MEDICAL CENTER | Age: 80
End: 2025-02-14
Attending: FAMILY MEDICINE
Payer: MEDICARE

## 2025-02-14 ENCOUNTER — HOSPITAL ENCOUNTER (OUTPATIENT)
Dept: LAB | Facility: MEDICAL CENTER | Age: 80
End: 2025-02-14
Attending: PSYCHIATRY & NEUROLOGY
Payer: MEDICARE

## 2025-02-14 DIAGNOSIS — E78.49 OTHER HYPERLIPIDEMIA: ICD-10-CM

## 2025-02-14 DIAGNOSIS — M85.89 OSTEOPENIA OF MULTIPLE SITES: ICD-10-CM

## 2025-02-14 DIAGNOSIS — G60.9 IDIOPATHIC PERIPHERAL NEUROPATHY: ICD-10-CM

## 2025-02-14 DIAGNOSIS — R73.9 HYPERGLYCEMIA: ICD-10-CM

## 2025-02-14 LAB
25(OH)D3 SERPL-MCNC: 27 NG/ML (ref 30–100)
ALBUMIN SERPL BCP-MCNC: 4.2 G/DL (ref 3.2–4.9)
ALBUMIN/GLOB SERPL: 1.6 G/DL
ALP SERPL-CCNC: 101 U/L (ref 30–99)
ALT SERPL-CCNC: 18 U/L (ref 2–50)
ANION GAP SERPL CALC-SCNC: 10 MMOL/L (ref 7–16)
AST SERPL-CCNC: 21 U/L (ref 12–45)
BASOPHILS # BLD AUTO: 1.3 % (ref 0–1.8)
BASOPHILS # BLD: 0.09 K/UL (ref 0–0.12)
BILIRUB SERPL-MCNC: 0.3 MG/DL (ref 0.1–1.5)
BUN SERPL-MCNC: 12 MG/DL (ref 8–22)
CALCIUM ALBUM COR SERPL-MCNC: 9.2 MG/DL (ref 8.5–10.5)
CALCIUM SERPL-MCNC: 9.4 MG/DL (ref 8.5–10.5)
CHLORIDE SERPL-SCNC: 103 MMOL/L (ref 96–112)
CHOLEST SERPL-MCNC: 204 MG/DL (ref 100–199)
CO2 SERPL-SCNC: 25 MMOL/L (ref 20–33)
CREAT SERPL-MCNC: 0.73 MG/DL (ref 0.5–1.4)
EOSINOPHIL # BLD AUTO: 0.09 K/UL (ref 0–0.51)
EOSINOPHIL NFR BLD: 1.3 % (ref 0–6.9)
ERYTHROCYTE [DISTWIDTH] IN BLOOD BY AUTOMATED COUNT: 43.9 FL (ref 35.9–50)
EST. AVERAGE GLUCOSE BLD GHB EST-MCNC: 131 MG/DL
FASTING STATUS PATIENT QL REPORTED: NORMAL
GFR SERPLBLD CREATININE-BSD FMLA CKD-EPI: 84 ML/MIN/1.73 M 2
GLOBULIN SER CALC-MCNC: 2.6 G/DL (ref 1.9–3.5)
GLUCOSE SERPL-MCNC: 96 MG/DL (ref 65–99)
HBA1C MFR BLD: 6.2 % (ref 4–5.6)
HCT VFR BLD AUTO: 40.3 % (ref 37–47)
HDLC SERPL-MCNC: 80 MG/DL
HGB BLD-MCNC: 13.2 G/DL (ref 12–16)
IMM GRANULOCYTES # BLD AUTO: 0.01 K/UL (ref 0–0.11)
IMM GRANULOCYTES NFR BLD AUTO: 0.1 % (ref 0–0.9)
LDLC SERPL CALC-MCNC: 111 MG/DL
LYMPHOCYTES # BLD AUTO: 3.12 K/UL (ref 1–4.8)
LYMPHOCYTES NFR BLD: 45.6 % (ref 22–41)
MCH RBC QN AUTO: 32 PG (ref 27–33)
MCHC RBC AUTO-ENTMCNC: 32.8 G/DL (ref 32.2–35.5)
MCV RBC AUTO: 97.8 FL (ref 81.4–97.8)
MONOCYTES # BLD AUTO: 0.62 K/UL (ref 0–0.85)
MONOCYTES NFR BLD AUTO: 9.1 % (ref 0–13.4)
NEUTROPHILS # BLD AUTO: 2.91 K/UL (ref 1.82–7.42)
NEUTROPHILS NFR BLD: 42.6 % (ref 44–72)
NRBC # BLD AUTO: 0 K/UL
NRBC BLD-RTO: 0 /100 WBC (ref 0–0.2)
PLATELET # BLD AUTO: 214 K/UL (ref 164–446)
PMV BLD AUTO: 11.8 FL (ref 9–12.9)
POTASSIUM SERPL-SCNC: 4.4 MMOL/L (ref 3.6–5.5)
PROT SERPL-MCNC: 6.8 G/DL (ref 6–8.2)
RBC # BLD AUTO: 4.12 M/UL (ref 4.2–5.4)
SODIUM SERPL-SCNC: 138 MMOL/L (ref 135–145)
TRIGL SERPL-MCNC: 64 MG/DL (ref 0–149)
VIT B12 SERPL-MCNC: 1002 PG/ML (ref 211–911)
WBC # BLD AUTO: 6.8 K/UL (ref 4.8–10.8)

## 2025-02-14 PROCEDURE — 84155 ASSAY OF PROTEIN SERUM: CPT

## 2025-02-14 PROCEDURE — 80053 COMPREHEN METABOLIC PANEL: CPT

## 2025-02-14 PROCEDURE — 80061 LIPID PANEL: CPT

## 2025-02-14 PROCEDURE — 85025 COMPLETE CBC W/AUTO DIFF WBC: CPT

## 2025-02-14 PROCEDURE — 82746 ASSAY OF FOLIC ACID SERUM: CPT

## 2025-02-14 PROCEDURE — 82306 VITAMIN D 25 HYDROXY: CPT | Mod: GA

## 2025-02-14 PROCEDURE — 83036 HEMOGLOBIN GLYCOSYLATED A1C: CPT | Mod: GA

## 2025-02-14 PROCEDURE — 82607 VITAMIN B-12: CPT

## 2025-02-14 PROCEDURE — 84165 PROTEIN E-PHORESIS SERUM: CPT

## 2025-02-14 PROCEDURE — 36415 COLL VENOUS BLD VENIPUNCTURE: CPT | Mod: GA

## 2025-02-15 LAB — FOLATE SERPL-MCNC: 26.7 NG/ML

## 2025-02-18 ENCOUNTER — OFFICE VISIT (OUTPATIENT)
Dept: NEUROLOGY | Facility: MEDICAL CENTER | Age: 80
End: 2025-02-18
Attending: PSYCHIATRY & NEUROLOGY
Payer: MEDICARE

## 2025-02-18 ENCOUNTER — RESULTS FOLLOW-UP (OUTPATIENT)
Dept: MEDICAL GROUP | Facility: LAB | Age: 80
End: 2025-02-18

## 2025-02-18 VITALS
TEMPERATURE: 97.8 F | HEIGHT: 61 IN | WEIGHT: 131.39 LBS | OXYGEN SATURATION: 97 % | BODY MASS INDEX: 24.81 KG/M2 | DIASTOLIC BLOOD PRESSURE: 56 MMHG | SYSTOLIC BLOOD PRESSURE: 102 MMHG | HEART RATE: 86 BPM

## 2025-02-18 DIAGNOSIS — R51.9 CHRONIC NONINTRACTABLE HEADACHE, UNSPECIFIED HEADACHE TYPE: ICD-10-CM

## 2025-02-18 DIAGNOSIS — G89.29 CHRONIC NONINTRACTABLE HEADACHE, UNSPECIFIED HEADACHE TYPE: ICD-10-CM

## 2025-02-18 DIAGNOSIS — G60.9 IDIOPATHIC PERIPHERAL NEUROPATHY: ICD-10-CM

## 2025-02-18 DIAGNOSIS — R73.01 ELEVATED FASTING BLOOD SUGAR: ICD-10-CM

## 2025-02-18 LAB
ALBUMIN SERPL ELPH-MCNC: 4.16 G/DL (ref 3.75–5.01)
ALPHA1 GLOB SERPL ELPH-MCNC: 0.26 G/DL (ref 0.19–0.46)
ALPHA2 GLOB SERPL ELPH-MCNC: 0.64 G/DL (ref 0.48–1.05)
B-GLOBULIN SERPL ELPH-MCNC: 0.74 G/DL (ref 0.48–1.1)
GAMMA GLOB SERPL ELPH-MCNC: 0.81 G/DL (ref 0.62–1.51)
INTERPRETATION SERPL IFE-IMP: NORMAL
MONOCLON BAND OBS SERPL: NORMAL
MONOCLONAL PROTEIN NL11656: NORMAL G/DL
PATHOLOGY STUDY: NORMAL
PROT SERPL-MCNC: 6.6 G/DL (ref 6.3–8.2)

## 2025-02-18 PROCEDURE — 99213 OFFICE O/P EST LOW 20 MIN: CPT | Performed by: PSYCHIATRY & NEUROLOGY

## 2025-02-18 PROCEDURE — 3078F DIAST BP <80 MM HG: CPT | Performed by: PSYCHIATRY & NEUROLOGY

## 2025-02-18 PROCEDURE — 3074F SYST BP LT 130 MM HG: CPT | Performed by: PSYCHIATRY & NEUROLOGY

## 2025-02-18 PROCEDURE — 99212 OFFICE O/P EST SF 10 MIN: CPT | Performed by: PSYCHIATRY & NEUROLOGY

## 2025-02-18 ASSESSMENT — PATIENT HEALTH QUESTIONNAIRE - PHQ9
SUM OF ALL RESPONSES TO PHQ QUESTIONS 1-9: 6
CLINICAL INTERPRETATION OF PHQ2 SCORE: 2
5. POOR APPETITE OR OVEREATING: 0 - NOT AT ALL

## 2025-02-18 ASSESSMENT — FIBROSIS 4 INDEX: FIB4 SCORE: 1.8
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
## 2025-02-19 NOTE — ASSESSMENT & PLAN NOTE
Patient's labs did show a high hemoglobin A1c but other labs are unremarkable.  Patient states her symptoms are not worse.

## 2025-02-20 ENCOUNTER — HOSPITAL ENCOUNTER (OUTPATIENT)
Facility: MEDICAL CENTER | Age: 80
End: 2025-02-20
Attending: FAMILY MEDICINE
Payer: MEDICARE

## 2025-02-20 ENCOUNTER — OFFICE VISIT (OUTPATIENT)
Dept: MEDICAL GROUP | Facility: LAB | Age: 80
End: 2025-02-20
Payer: MEDICARE

## 2025-02-20 VITALS
WEIGHT: 130.8 LBS | DIASTOLIC BLOOD PRESSURE: 70 MMHG | HEIGHT: 61 IN | TEMPERATURE: 98.3 F | SYSTOLIC BLOOD PRESSURE: 122 MMHG | OXYGEN SATURATION: 95 % | RESPIRATION RATE: 16 BRPM | HEART RATE: 84 BPM | BODY MASS INDEX: 24.7 KG/M2

## 2025-02-20 DIAGNOSIS — E04.1 NONTOXIC SINGLE THYROID NODULE: ICD-10-CM

## 2025-02-20 DIAGNOSIS — N30.00 ACUTE CYSTITIS WITHOUT HEMATURIA: ICD-10-CM

## 2025-02-20 DIAGNOSIS — R73.03 PREDIABETES: ICD-10-CM

## 2025-02-20 LAB
AMBIGUOUS DTTM AMBI4: NORMAL
APPEARANCE UR: CLEAR
BILIRUB UR STRIP-MCNC: NEGATIVE MG/DL
COLOR UR AUTO: YELLOW
GLUCOSE UR STRIP.AUTO-MCNC: NEGATIVE MG/DL
KETONES UR STRIP.AUTO-MCNC: NEGATIVE MG/DL
LEUKOCYTE ESTERASE UR QL STRIP.AUTO: NORMAL
NITRITE UR QL STRIP.AUTO: NEGATIVE
PH UR STRIP.AUTO: 6.5 [PH] (ref 5–8)
PROT UR QL STRIP: NEGATIVE MG/DL
RBC UR QL AUTO: NEGATIVE
SP GR UR STRIP.AUTO: 1.01
UROBILINOGEN UR STRIP-MCNC: 0.2 MG/DL

## 2025-02-20 PROCEDURE — 3074F SYST BP LT 130 MM HG: CPT | Performed by: FAMILY MEDICINE

## 2025-02-20 PROCEDURE — G2211 COMPLEX E/M VISIT ADD ON: HCPCS | Performed by: FAMILY MEDICINE

## 2025-02-20 PROCEDURE — 3078F DIAST BP <80 MM HG: CPT | Performed by: FAMILY MEDICINE

## 2025-02-20 PROCEDURE — 87077 CULTURE AEROBIC IDENTIFY: CPT

## 2025-02-20 PROCEDURE — 99214 OFFICE O/P EST MOD 30 MIN: CPT | Performed by: FAMILY MEDICINE

## 2025-02-20 PROCEDURE — 87086 URINE CULTURE/COLONY COUNT: CPT

## 2025-02-20 PROCEDURE — 81002 URINALYSIS NONAUTO W/O SCOPE: CPT | Performed by: FAMILY MEDICINE

## 2025-02-20 RX ORDER — SULFAMETHOXAZOLE AND TRIMETHOPRIM 800; 160 MG/1; MG/1
1 TABLET ORAL 2 TIMES DAILY
Qty: 6 TABLET | Refills: 0 | Status: SHIPPED | OUTPATIENT
Start: 2025-02-20 | End: 2025-02-23

## 2025-02-20 ASSESSMENT — FIBROSIS 4 INDEX: FIB4 SCORE: 1.8

## 2025-02-20 NOTE — PROGRESS NOTES
"Subjective:     CC: UTI, labs, thyroid    HPI:   Joie presents today with urine for UTI and also to discuss additional concerns.  Symptoms initially began about 3 days ago with mild burning with urination have improved and now with more mild symptoms.  She does have some frequency as well.  No fevers, no flank pain.    Recent labs through neurology with A1c 6.2.    Following with endocrinology for thyroid nodule.  I did place referral to ENT.      Current Outpatient Medications   Medication Sig Dispense Refill    amitriptyline (ELAVIL) 100 MG Tab TAKE 1 TABLET BY MOUTH EVERY DAY 90 Tablet 3    ibuprofen (MOTRIN) 800 MG Tab Take 1 Tablet by mouth every 8 hours as needed for Moderate Pain. 60 Tablet 0    acyclovir (ZOVIRAX) 800 MG Tab Take 1 Tablet by mouth 2 times a day. 90 Tablet 1    levothyroxine (SYNTHROID) 75 MCG Tab Take 1 Tablet by mouth every morning on an empty stomach. 30 Tablet 0    hydrOXYzine HCl (ATARAX) 25 MG Tab Take 1 Tablet by mouth every 24 hours as needed for Itching. 30 Tablet 1    Melatonin 10 MG Tab Take  by mouth.      Ascorbic Acid (VITAMIN C) 1000 MG Tab Take  by mouth.      NON SPECIFIED Homeopathic Immune system      Butalbital-Acetaminophen  MG Tab Take  by mouth.      diphenhydrAMINE-APAP, sleep, (TYLENOL PM EXTRA STRENGTH)  MG Tab Take  by mouth.      therapeutic multivitamin-minerals (THERAGRAN-M) Tab Take 1 Tab by mouth every day.       No current facility-administered medications for this visit.       Medications, past medical history, allergies, and social history have been reviewed and updated.      Objective:       Exam:  /70 (BP Location: Left arm, Patient Position: Sitting, BP Cuff Size: Adult)   Pulse 84   Temp 36.8 °C (98.3 °F) (Temporal)   Resp 16   Ht 1.549 m (5' 1\")   Wt 59.3 kg (130 lb 12.8 oz)   SpO2 95%   BMI 24.71 kg/m²  Body mass index is 24.71 kg/m².    Constitutional: Alert. Well appearing. No distress.  Skin: Warm, dry, good turgor, no " visible rashes.  ENMT: Moist mucous membranes.   Neck: supple, no obvious thyromegaly or nodule  Respiratory: Normal effort.   Abdomen: Soft, non-tender. No CVA tenderness  Neuro: Moves all four extremities. No facial droop.  Psych: Answers questions appropriately. Normal affect and mood.    Assessment & Plan:     79 y.o. female with the following -     1. Acute cystitis without hematuria  Dysuria, POCT UA with positive leukocyte esterase.  Start Bactrim, culture sent.  She does have a history of pseudomonal UTIs so discussed importance of prompt follow-up if symptoms worsening.  Covering with Bactrim for now pending culture results as would like to avoid fluoroquinolone if not needed.  - POCT Urinalysis  - URINE CULTURE(NEW); Future  - sulfamethoxazole-trimethoprim (BACTRIM DS) 800-160 MG tablet; Take 1 Tablet by mouth 2 times a day for 3 days.  Dispense: 6 Tablet; Refill: 0    2. Prediabetes  A1C 6.2.  We discussed dietary measures including limiting simple carbohydrates and sugars in the diet.  Recheck 3 months.  Consider metformin if not improving.  - HEMOGLOBIN A1C; Future    3. Nontoxic single thyroid nodule  Following with endocrinology, referral has been placed to ENT to discuss surgical removal and she plans to follow-up on this.    Today's visit is associated with medical care services that serve as the continuing focal point for all necessary health care services.  This includes providing services to the patient on an ongoing basis that results in care that is collaborative and personalized to the patient.        Please note that this note was created using voice recognition software.

## 2025-02-23 LAB
BACTERIA UR CULT: NORMAL
SIGNIFICANT IND 70042: NORMAL
SITE SITE: NORMAL
SOURCE SOURCE: NORMAL

## 2025-02-25 ENCOUNTER — RESULTS FOLLOW-UP (OUTPATIENT)
Dept: MEDICAL GROUP | Facility: LAB | Age: 80
End: 2025-02-25

## 2025-03-05 ENCOUNTER — HOSPITAL ENCOUNTER (OUTPATIENT)
Dept: LAB | Facility: MEDICAL CENTER | Age: 80
End: 2025-03-05
Attending: STUDENT IN AN ORGANIZED HEALTH CARE EDUCATION/TRAINING PROGRAM
Payer: MEDICARE

## 2025-03-05 LAB
T3FREE SERPL-MCNC: 2.58 PG/ML (ref 2–4.4)
T4 FREE SERPL-MCNC: 1.29 NG/DL (ref 0.93–1.7)
TSH SERPL-ACNC: 3.34 UIU/ML (ref 0.35–5.5)

## 2025-03-05 PROCEDURE — 84439 ASSAY OF FREE THYROXINE: CPT

## 2025-03-05 PROCEDURE — 84481 FREE ASSAY (FT-3): CPT

## 2025-03-05 PROCEDURE — 36415 COLL VENOUS BLD VENIPUNCTURE: CPT

## 2025-03-05 PROCEDURE — 84443 ASSAY THYROID STIM HORMONE: CPT

## 2025-04-04 ENCOUNTER — TELEPHONE (OUTPATIENT)
Dept: MEDICAL GROUP | Facility: LAB | Age: 80
End: 2025-04-04
Payer: MEDICARE

## 2025-04-09 DIAGNOSIS — M54.2 CHRONIC NECK PAIN: ICD-10-CM

## 2025-04-09 DIAGNOSIS — G89.29 CHRONIC NECK PAIN: ICD-10-CM

## 2025-04-09 RX ORDER — IBUPROFEN 800 MG/1
800 TABLET, FILM COATED ORAL EVERY 8 HOURS PRN
Qty: 60 TABLET | Refills: 3 | Status: SHIPPED | OUTPATIENT
Start: 2025-04-09

## 2025-05-16 ENCOUNTER — APPOINTMENT (OUTPATIENT)
Dept: URBAN - METROPOLITAN AREA CLINIC 15 | Facility: CLINIC | Age: 80
Setting detail: DERMATOLOGY
End: 2025-05-16

## 2025-05-16 DIAGNOSIS — B00.1 HERPESVIRAL VESICULAR DERMATITIS: ICD-10-CM

## 2025-05-16 DIAGNOSIS — L82.1 OTHER SEBORRHEIC KERATOSIS: ICD-10-CM

## 2025-05-16 DIAGNOSIS — D18.0 HEMANGIOMA: ICD-10-CM

## 2025-05-16 DIAGNOSIS — Z85.820 PERSONAL HISTORY OF MALIGNANT MELANOMA OF SKIN: ICD-10-CM

## 2025-05-16 DIAGNOSIS — L82.0 INFLAMED SEBORRHEIC KERATOSIS: ICD-10-CM

## 2025-05-16 DIAGNOSIS — D22 MELANOCYTIC NEVI: ICD-10-CM

## 2025-05-16 DIAGNOSIS — L81.4 OTHER MELANIN HYPERPIGMENTATION: ICD-10-CM

## 2025-05-16 DIAGNOSIS — Z71.89 OTHER SPECIFIED COUNSELING: ICD-10-CM

## 2025-05-16 PROBLEM — D22.72 MELANOCYTIC NEVI OF LEFT LOWER LIMB, INCLUDING HIP: Status: ACTIVE | Noted: 2025-05-16

## 2025-05-16 PROBLEM — D22.71 MELANOCYTIC NEVI OF RIGHT LOWER LIMB, INCLUDING HIP: Status: ACTIVE | Noted: 2025-05-16

## 2025-05-16 PROBLEM — D22.5 MELANOCYTIC NEVI OF TRUNK: Status: ACTIVE | Noted: 2025-05-16

## 2025-05-16 PROBLEM — D22.62 MELANOCYTIC NEVI OF LEFT UPPER LIMB, INCLUDING SHOULDER: Status: ACTIVE | Noted: 2025-05-16

## 2025-05-16 PROBLEM — D48.5 NEOPLASM OF UNCERTAIN BEHAVIOR OF SKIN: Status: ACTIVE | Noted: 2025-05-16

## 2025-05-16 PROBLEM — D18.01 HEMANGIOMA OF SKIN AND SUBCUTANEOUS TISSUE: Status: ACTIVE | Noted: 2025-05-16

## 2025-05-16 PROBLEM — D22.61 MELANOCYTIC NEVI OF RIGHT UPPER LIMB, INCLUDING SHOULDER: Status: ACTIVE | Noted: 2025-05-16

## 2025-05-16 PROCEDURE — 99203 OFFICE O/P NEW LOW 30 MIN: CPT | Mod: 25

## 2025-05-16 PROCEDURE — ? COUNSELING

## 2025-05-16 PROCEDURE — ? DIAGNOSIS COMMENT

## 2025-05-16 PROCEDURE — ? BIOPSY BY SHAVE METHOD

## 2025-05-16 PROCEDURE — 11102 TANGNTL BX SKIN SINGLE LES: CPT

## 2025-05-16 ASSESSMENT — LOCATION DETAILED DESCRIPTION DERM
LOCATION DETAILED: LEFT INFERIOR FOREHEAD
LOCATION DETAILED: RIGHT SUPERIOR NASAL CHEEK
LOCATION DETAILED: RIGHT INFERIOR CENTRAL MALAR CHEEK
LOCATION DETAILED: RIGHT ANTECUBITAL SKIN
LOCATION DETAILED: RIGHT INFERIOR MEDIAL FOREHEAD
LOCATION DETAILED: LEFT DISTAL POSTERIOR THIGH
LOCATION DETAILED: RIGHT ANTERIOR DISTAL UPPER ARM
LOCATION DETAILED: RIGHT SUPERIOR MEDIAL MIDBACK
LOCATION DETAILED: LEFT POPLITEAL SKIN
LOCATION DETAILED: LEFT LATERAL PROXIMAL PRETIBIAL REGION
LOCATION DETAILED: INFERIOR THORACIC SPINE
LOCATION DETAILED: RIGHT ANTERIOR PROXIMAL UPPER ARM
LOCATION DETAILED: LEFT PROXIMAL PRETIBIAL REGION
LOCATION DETAILED: LEFT ANTERIOR DISTAL UPPER ARM
LOCATION DETAILED: LEFT INFERIOR MEDIAL UPPER BACK
LOCATION DETAILED: RIGHT LATERAL MALAR CHEEK
LOCATION DETAILED: LEFT INFERIOR LATERAL MIDBACK
LOCATION DETAILED: LEFT SUPERIOR MEDIAL MIDBACK
LOCATION DETAILED: RIGHT DISTAL POSTERIOR THIGH
LOCATION DETAILED: LEFT VENTRAL PROXIMAL FOREARM
LOCATION DETAILED: SUBXIPHOID
LOCATION DETAILED: RIGHT MEDIAL UPPER BACK
LOCATION DETAILED: RIGHT MEDIAL SUPERIOR CHEST
LOCATION DETAILED: MIDDLE STERNUM
LOCATION DETAILED: RIGHT PROXIMAL PRETIBIAL REGION
LOCATION DETAILED: LEFT VENTRAL LATERAL PROXIMAL FOREARM
LOCATION DETAILED: XIPHOID
LOCATION DETAILED: LEFT ANTERIOR PROXIMAL UPPER ARM
LOCATION DETAILED: RIGHT POPLITEAL SKIN
LOCATION DETAILED: RIGHT VENTRAL PROXIMAL FOREARM

## 2025-05-16 ASSESSMENT — LOCATION SIMPLE DESCRIPTION DERM
LOCATION SIMPLE: LEFT UPPER ARM
LOCATION SIMPLE: RIGHT POSTERIOR THIGH
LOCATION SIMPLE: RIGHT UPPER BACK
LOCATION SIMPLE: RIGHT LOWER BACK
LOCATION SIMPLE: RIGHT FOREARM
LOCATION SIMPLE: ABDOMEN
LOCATION SIMPLE: RIGHT UPPER ARM
LOCATION SIMPLE: RIGHT PRETIBIAL REGION
LOCATION SIMPLE: RIGHT CHEEK
LOCATION SIMPLE: LEFT FOREHEAD
LOCATION SIMPLE: LEFT POPLITEAL SKIN
LOCATION SIMPLE: CHEST
LOCATION SIMPLE: LEFT PRETIBIAL REGION
LOCATION SIMPLE: LEFT FOREARM
LOCATION SIMPLE: RIGHT FOREHEAD
LOCATION SIMPLE: UPPER BACK
LOCATION SIMPLE: RIGHT POPLITEAL SKIN
LOCATION SIMPLE: LEFT UPPER BACK
LOCATION SIMPLE: LEFT LOWER BACK
LOCATION SIMPLE: LEFT POSTERIOR THIGH

## 2025-05-16 ASSESSMENT — LOCATION ZONE DERM
LOCATION ZONE: LEG
LOCATION ZONE: TRUNK
LOCATION ZONE: FACE
LOCATION ZONE: ARM

## 2025-05-16 NOTE — PROCEDURE: BIOPSY BY SHAVE METHOD
Detail Level: Detailed
Depth Of Biopsy: dermis
Was A Bandage Applied: Yes
Size Of Lesion In Cm: 0.3
X Size Of Lesion In Cm: 0
Biopsy Type: H and E
Biopsy Method: Personna blade
Anesthesia Type: 1% lidocaine with epinephrine
Anesthesia Volume In Cc: 1
Hemostasis: Electrocautery
Wound Care: Vaseline
Dressing: Band-Aid
Destruction After The Procedure: No
Type Of Destruction Used: Electrodesiccation
Curettage Text: The wound bed was treated with curettage after the biopsy was performed.
Cryotherapy Text: The wound bed was treated with cryotherapy after the biopsy was performed.
Electrodesiccation Text: The wound bed was treated with electrodesiccation after the biopsy was performed.
Electrodesiccation And Curettage Text: The wound bed was treated with electrodesiccation and curettage after the biopsy was performed.
Silver Nitrate Text: The wound bed was treated with silver nitrate after the biopsy was performed.
Lab: [id number]
Lab Facility: 
Medical Necessity Information: It is in your best interest to select a reason for this procedure from the list below. All of these items fulfill various CMS LCD requirements except the new and changing color options.
Consent: Written consent was obtained and risks were reviewed including but not limited to scarring, infection, bleeding, scabbing, incomplete removal, nerve damage and allergy to anesthesia.
Post-Care Instructions: I reviewed with the patient in detail post-care instructions. Patient is to keep the biopsy site dry overnight, and then apply bacitracin/petroleum  twice daily until healed. Patient may apply hydrogen peroxide soaks to remove any crusting.
Notification Instructions: Patient will be notified of biopsy results. However, patient instructed to call the office if not contacted within 2 weeks.
Billing Type: Third-Party Bill
Information: Selecting Yes will display possible errors in your note based on the variables you have selected. This validation is only offered as a suggestion for you. PLEASE NOTE THAT THE VALIDATION TEXT WILL BE REMOVED WHEN YOU FINALIZE YOUR NOTE. IF YOU WANT TO FAX A PRELIMINARY NOTE YOU WILL NEED TO TOGGLE THIS TO 'NO' IF YOU DO NOT WANT IT IN YOUR FAXED NOTE.

## 2025-05-16 NOTE — PROCEDURE: DIAGNOSIS COMMENT
Render Risk Assessment In Note?: no
Detail Level: Simple
Comment: Pt reports she had a MOHS procedure with Dr. Brooks at [facility].

## 2025-05-29 DIAGNOSIS — M54.2 CHRONIC NECK PAIN: ICD-10-CM

## 2025-05-29 DIAGNOSIS — G89.29 CHRONIC NECK PAIN: ICD-10-CM

## 2025-05-30 RX ORDER — IBUPROFEN 800 MG/1
800 TABLET, FILM COATED ORAL EVERY 8 HOURS PRN
Qty: 60 TABLET | Refills: 3 | Status: SHIPPED | OUTPATIENT
Start: 2025-05-30

## 2025-05-30 NOTE — TELEPHONE ENCOUNTER
Received request via: Patient    Was the patient seen in the last year in this department? Yes    Does the patient have an active prescription (recently filled or refills available) for medication(s) requested? No    Pharmacy Name: [facility]     Does the patient have [facility] and need 100-day supply? (This applies to ALL medications) Patient does not have SCP

## 2025-07-07 ENCOUNTER — HOSPITAL ENCOUNTER (OUTPATIENT)
Facility: MEDICAL CENTER | Age: 80
End: 2025-07-07
Attending: STUDENT IN AN ORGANIZED HEALTH CARE EDUCATION/TRAINING PROGRAM
Payer: MEDICARE

## 2025-07-08 ENCOUNTER — HOSPITAL ENCOUNTER (OUTPATIENT)
Dept: LAB | Facility: MEDICAL CENTER | Age: 80
End: 2025-07-08
Attending: PHYSICIAN ASSISTANT
Payer: MEDICARE

## 2025-07-08 LAB
T4 FREE SERPL-MCNC: 1.33 NG/DL (ref 0.93–1.7)
TSH SERPL-ACNC: 1.86 UIU/ML (ref 0.38–5.33)

## 2025-07-08 PROCEDURE — 84439 ASSAY OF FREE THYROXINE: CPT

## 2025-07-08 PROCEDURE — 84443 ASSAY THYROID STIM HORMONE: CPT

## 2025-07-08 PROCEDURE — 36415 COLL VENOUS BLD VENIPUNCTURE: CPT

## 2025-07-16 ENCOUNTER — PATIENT MESSAGE (OUTPATIENT)
Dept: MEDICAL GROUP | Facility: LAB | Age: 80
End: 2025-07-16

## 2025-07-16 ENCOUNTER — HOSPITAL ENCOUNTER (OUTPATIENT)
Dept: LAB | Facility: MEDICAL CENTER | Age: 80
End: 2025-07-16
Attending: FAMILY MEDICINE
Payer: MEDICARE

## 2025-07-16 ENCOUNTER — TELEPHONE (OUTPATIENT)
Dept: MEDICAL GROUP | Facility: LAB | Age: 80
End: 2025-07-16

## 2025-07-16 ENCOUNTER — E-VISIT (OUTPATIENT)
Dept: MEDICAL GROUP | Facility: LAB | Age: 80
End: 2025-07-16
Payer: MEDICARE

## 2025-07-16 DIAGNOSIS — R39.9 UTI SYMPTOMS: Primary | ICD-10-CM

## 2025-07-16 DIAGNOSIS — R39.9 UTI SYMPTOMS: ICD-10-CM

## 2025-07-16 DIAGNOSIS — N30.01 ACUTE CYSTITIS WITH HEMATURIA: Primary | ICD-10-CM

## 2025-07-16 LAB
APPEARANCE UR: ABNORMAL
BACTERIA #/AREA URNS HPF: ABNORMAL /HPF
BILIRUB UR QL STRIP.AUTO: NEGATIVE
CASTS URNS QL MICRO: ABNORMAL /LPF (ref 0–2)
COLOR UR: YELLOW
EPITHELIAL CELLS 1715: ABNORMAL /HPF (ref 0–5)
GLUCOSE UR STRIP.AUTO-MCNC: NEGATIVE MG/DL
KETONES UR STRIP.AUTO-MCNC: NEGATIVE MG/DL
LEUKOCYTE ESTERASE UR QL STRIP.AUTO: ABNORMAL
MICRO URNS: ABNORMAL
NITRITE UR QL STRIP.AUTO: NEGATIVE
PH UR STRIP.AUTO: 7 [PH] (ref 5–8)
PROT UR QL STRIP: 30 MG/DL
RBC # URNS HPF: ABNORMAL /HPF (ref 0–2)
RBC UR QL AUTO: ABNORMAL
SP GR UR STRIP.AUTO: 1.01
UROBILINOGEN UR STRIP.AUTO-MCNC: 0.2 EU/DL
WBC #/AREA URNS HPF: >100 /HPF

## 2025-07-16 PROCEDURE — 99421 OL DIG E/M SVC 5-10 MIN: CPT | Performed by: FAMILY MEDICINE

## 2025-07-16 PROCEDURE — 81001 URINALYSIS AUTO W/SCOPE: CPT

## 2025-07-16 PROCEDURE — 87086 URINE CULTURE/COLONY COUNT: CPT

## 2025-07-16 NOTE — PROGRESS NOTES
This E-Visit evaluation was conducted via EPIC MyChart using secure and encrypted technology. The patient was in their home in the [facility].    The patient's identity was confirmed, and electronic consent was obtained for this E-Visit.    Reason for Visit:   Chief Complaint   Patient presents with    UTI     Entered automatically based on patient selection in [employer].       Patient Questionnaire Answers:  [facility] E-Visit Dysuria 1    7/16/2025 10:43 AM PDT - Filed by Patient   Which of the following are you experiencing? Pain while passing urine;  Urinating more frequently   What makes passing urine painful or difficult? Right now, it is only a slight burn.  I did take 2 Oxy-Powder supplements yesterday that  contains 700mg magnesium total plus 90 mg Natural Citric Acid.. it is a transporter of water for constiipation.Want to be preventative   How long have you had your symptoms? Two days or less   Do you have any of the following? None of these   What does your urine look like? It is clear   Have you had any unusual discharge? No   Do you have any sores on your genitals? No   Have you had any kidney problems in the past? No   Within the past 3 months, have you had any surgery on your kidneys or bladder, or have you had a tube inserted to collect your urine? No, I have not had either   Have you had similar symptoms in the past? I have had similar symptoms before   Are you pregnant? I am confident that I am not pregnant   Is there anything else you would like to add?           Assessment and Plan:  Urinalysis and culture pending.  Will await results and can plan to provide empiric antibiotics depending on urinalysis results.    Follow-Up: As needed    Total Minutes Spent: 8 min

## 2025-07-16 NOTE — TELEPHONE ENCOUNTER
"Caller Name: Joie Hart   Call Back Number: [phone]    How would the patient prefer to be contacted with a response: Phone call OK to leave a detailed message    Patient called stating that she feels another UTI coming and is afraid this will turned into another episode of \" Pseudomonas aeruginosa\". She is requesting a lab order for UA and Urine culture ordered please advice I can schedule pt an appointment if appropriate.    "

## 2025-07-17 ENCOUNTER — RESULTS FOLLOW-UP (OUTPATIENT)
Dept: MEDICAL GROUP | Facility: LAB | Age: 80
End: 2025-07-17
Payer: MEDICARE

## 2025-07-17 RX ORDER — SULFAMETHOXAZOLE AND TRIMETHOPRIM 800; 160 MG/1; MG/1
1 TABLET ORAL 2 TIMES DAILY
Qty: 6 TABLET | Refills: 0 | Status: SHIPPED | OUTPATIENT
Start: 2025-07-17 | End: 2025-07-20

## 2025-07-17 RX ORDER — NITROFURANTOIN 25; 75 MG/1; MG/1
100 CAPSULE ORAL 2 TIMES DAILY
Qty: 10 CAPSULE | Refills: 0 | Status: SHIPPED | OUTPATIENT
Start: 2025-07-17 | End: 2025-07-17

## 2025-07-18 LAB
BACTERIA UR CULT: NORMAL
SIGNIFICANT IND 70042: NORMAL
SITE SITE: NORMAL
SOURCE SOURCE: NORMAL

## 2025-07-21 ENCOUNTER — PATIENT MESSAGE (OUTPATIENT)
Dept: MEDICAL GROUP | Facility: LAB | Age: 80
End: 2025-07-21
Payer: MEDICARE

## 2025-07-21 DIAGNOSIS — R39.9 UTI SYMPTOMS: Primary | ICD-10-CM

## 2025-07-21 NOTE — TELEPHONE ENCOUNTER
Please let patient know that she needs to return to the lab for a repeat urine culture, which I ordered.  Thanks

## 2025-07-22 ENCOUNTER — HOSPITAL ENCOUNTER (OUTPATIENT)
Dept: LAB | Facility: MEDICAL CENTER | Age: 80
End: 2025-07-22
Attending: NURSE PRACTITIONER
Payer: MEDICARE

## 2025-07-22 DIAGNOSIS — R39.9 UTI SYMPTOMS: ICD-10-CM

## 2025-07-22 PROCEDURE — 87086 URINE CULTURE/COLONY COUNT: CPT

## 2025-07-25 LAB
BACTERIA UR CULT: NORMAL
SIGNIFICANT IND 70042: NORMAL
SITE SITE: NORMAL
SOURCE SOURCE: NORMAL

## 2025-07-28 ENCOUNTER — OFFICE VISIT (OUTPATIENT)
Dept: NEUROLOGY | Facility: MEDICAL CENTER | Age: 80
End: 2025-07-28
Attending: PSYCHIATRY & NEUROLOGY
Payer: MEDICARE

## 2025-07-28 VITALS
TEMPERATURE: 96.7 F | WEIGHT: 126.76 LBS | OXYGEN SATURATION: 99 % | BODY MASS INDEX: 23.93 KG/M2 | DIASTOLIC BLOOD PRESSURE: 66 MMHG | SYSTOLIC BLOOD PRESSURE: 118 MMHG | HEART RATE: 91 BPM | HEIGHT: 61 IN

## 2025-07-28 DIAGNOSIS — M54.2 CERVICALGIA: ICD-10-CM

## 2025-07-28 DIAGNOSIS — G89.29 CHRONIC NONINTRACTABLE HEADACHE, UNSPECIFIED HEADACHE TYPE: ICD-10-CM

## 2025-07-28 DIAGNOSIS — G60.9 IDIOPATHIC PERIPHERAL NEUROPATHY: Primary | ICD-10-CM

## 2025-07-28 DIAGNOSIS — R51.9 CHRONIC NONINTRACTABLE HEADACHE, UNSPECIFIED HEADACHE TYPE: ICD-10-CM

## 2025-07-28 PROCEDURE — 99215 OFFICE O/P EST HI 40 MIN: CPT | Performed by: PSYCHIATRY & NEUROLOGY

## 2025-07-28 PROCEDURE — 3074F SYST BP LT 130 MM HG: CPT | Performed by: PSYCHIATRY & NEUROLOGY

## 2025-07-28 PROCEDURE — 3078F DIAST BP <80 MM HG: CPT | Performed by: PSYCHIATRY & NEUROLOGY

## 2025-07-28 PROCEDURE — 99213 OFFICE O/P EST LOW 20 MIN: CPT

## 2025-07-28 ASSESSMENT — FIBROSIS 4 INDEX: FIB4 SCORE: 1.83

## 2025-07-28 ASSESSMENT — PATIENT HEALTH QUESTIONNAIRE - PHQ9: CLINICAL INTERPRETATION OF PHQ2 SCORE: 0

## 2025-07-28 NOTE — ASSESSMENT & PLAN NOTE
Pt states she only has hadaches every once in a while and she feels like the neck can set off her issues. She has seen Dr Damico after the last/concussion fall.She manages it with Ibuprofen 800mg prn.
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
## 2025-08-05 ENCOUNTER — TELEPHONE (OUTPATIENT)
Dept: MEDICAL GROUP | Facility: LAB | Age: 80
End: 2025-08-05
Payer: MEDICARE

## 2025-08-05 DIAGNOSIS — Z12.31 ENCOUNTER FOR SCREENING MAMMOGRAM FOR MALIGNANT NEOPLASM OF BREAST: ICD-10-CM

## 2025-08-05 DIAGNOSIS — Z12.11 SCREEN FOR COLON CANCER: Primary | ICD-10-CM

## 2025-08-11 ENCOUNTER — APPOINTMENT (OUTPATIENT)
Dept: RADIOLOGY | Facility: MEDICAL CENTER | Age: 80
End: 2025-08-11
Attending: NURSE PRACTITIONER
Payer: MEDICARE

## 2025-08-11 VITALS — WEIGHT: 123 LBS | BODY MASS INDEX: 23.22 KG/M2 | HEIGHT: 61 IN

## 2025-08-11 DIAGNOSIS — Z12.31 ENCOUNTER FOR SCREENING MAMMOGRAM FOR MALIGNANT NEOPLASM OF BREAST: ICD-10-CM

## 2025-08-11 PROCEDURE — 77067 SCR MAMMO BI INCL CAD: CPT

## 2025-08-11 ASSESSMENT — FIBROSIS 4 INDEX: FIB4 SCORE: 1.83

## 2025-08-19 ENCOUNTER — APPOINTMENT (OUTPATIENT)
Dept: NEUROLOGY | Facility: MEDICAL CENTER | Age: 80
End: 2025-08-19
Attending: PSYCHIATRY & NEUROLOGY
Payer: MEDICARE